# Patient Record
Sex: MALE | Race: WHITE | NOT HISPANIC OR LATINO | Employment: PART TIME | URBAN - METROPOLITAN AREA
[De-identification: names, ages, dates, MRNs, and addresses within clinical notes are randomized per-mention and may not be internally consistent; named-entity substitution may affect disease eponyms.]

---

## 2021-07-06 ENCOUNTER — HISTORICAL (OUTPATIENT)
Dept: ADMINISTRATIVE | Facility: HOSPITAL | Age: 41
End: 2021-07-06

## 2021-07-06 LAB
ABS NEUT (OLG): 4.4 X10(3)/MCL (ref 2.1–9.2)
ALBUMIN SERPL-MCNC: 4 GM/DL (ref 3.5–5)
ALBUMIN/GLOB SERPL: 1.7 RATIO (ref 1.1–2)
ALP SERPL-CCNC: 61 UNIT/L (ref 40–150)
ALT SERPL-CCNC: 17 UNIT/L (ref 0–55)
APPEARANCE, UA: CLEAR
AST SERPL-CCNC: 21 UNIT/L (ref 5–34)
BACTERIA SPEC CULT: NORMAL /HPF
BASOPHILS # BLD AUTO: 0 X10(3)/MCL (ref 0–0.2)
BASOPHILS NFR BLD AUTO: 1 %
BILIRUB SERPL-MCNC: 0.9 MG/DL
BILIRUB UR QL STRIP: NEGATIVE
BILIRUBIN DIRECT+TOT PNL SERPL-MCNC: 0.3 MG/DL (ref 0–0.5)
BILIRUBIN DIRECT+TOT PNL SERPL-MCNC: 0.6 MG/DL (ref 0–0.8)
BUN SERPL-MCNC: 10 MG/DL (ref 8.9–20.6)
CALCIUM SERPL-MCNC: 9.6 MG/DL (ref 8.4–10.2)
CHLORIDE SERPL-SCNC: 102 MMOL/L (ref 98–107)
CHOLEST SERPL-MCNC: 218 MG/DL
CHOLEST/HDLC SERPL: 5 {RATIO} (ref 0–5)
CO2 SERPL-SCNC: 28 MMOL/L (ref 22–29)
COLOR UR: YELLOW
CREAT SERPL-MCNC: 0.89 MG/DL (ref 0.73–1.18)
EOSINOPHIL # BLD AUTO: 0.1 X10(3)/MCL (ref 0–0.9)
EOSINOPHIL NFR BLD AUTO: 1 %
ERYTHROCYTE [DISTWIDTH] IN BLOOD BY AUTOMATED COUNT: 13.1 % (ref 11.5–17)
GLOBULIN SER-MCNC: 2.4 GM/DL (ref 2.4–3.5)
GLUCOSE (UA): NEGATIVE
GLUCOSE SERPL-MCNC: 79 MG/DL (ref 74–100)
HCT VFR BLD AUTO: 50.4 % (ref 42–52)
HDLC SERPL-MCNC: 47 MG/DL (ref 35–60)
HGB BLD-MCNC: 17.7 GM/DL (ref 14–18)
HGB UR QL STRIP: NEGATIVE
KETONES UR QL STRIP: NEGATIVE
LDLC SERPL CALC-MCNC: 159 MG/DL (ref 50–140)
LEUKOCYTE ESTERASE UR QL STRIP: NEGATIVE
LITHIUM SERPL-MCNC: <0.1 MMOL/L (ref 0.5–1.2)
LYMPHOCYTES # BLD AUTO: 1.3 X10(3)/MCL (ref 0.6–4.6)
LYMPHOCYTES NFR BLD AUTO: 20 %
MCH RBC QN AUTO: 32 PG (ref 27–31)
MCHC RBC AUTO-ENTMCNC: 35.1 GM/DL (ref 33–36)
MCV RBC AUTO: 91.1 FL (ref 80–94)
MONOCYTES # BLD AUTO: 0.5 X10(3)/MCL (ref 0.1–1.3)
MONOCYTES NFR BLD AUTO: 8 %
NEUTROPHILS # BLD AUTO: 4.4 X10(3)/MCL (ref 2.1–9.2)
NEUTROPHILS NFR BLD AUTO: 70 %
NITRITE UR QL STRIP: NEGATIVE
PH UR STRIP: 6 [PH] (ref 5–9)
PLATELET # BLD AUTO: 217 X10(3)/MCL (ref 130–400)
PMV BLD AUTO: 9.3 FL (ref 9.4–12.4)
POTASSIUM SERPL-SCNC: 4.4 MMOL/L (ref 3.5–5.1)
PROT SERPL-MCNC: 6.4 GM/DL (ref 6.4–8.3)
PROT UR QL STRIP: NEGATIVE
RBC # BLD AUTO: 5.53 X10(6)/MCL (ref 4.7–6.1)
RBC #/AREA URNS HPF: NORMAL /[HPF]
SODIUM SERPL-SCNC: 139 MMOL/L (ref 136–145)
SP GR UR STRIP: 1.01 (ref 1–1.03)
SQUAMOUS EPITHELIAL, UA: NORMAL /HPF
TRIGL SERPL-MCNC: 58 MG/DL (ref 34–140)
TSH SERPL-ACNC: 1.51 UIU/ML (ref 0.35–4.94)
UA WBC MAN: NORMAL
UROBILINOGEN UR STRIP-ACNC: 0.2
VLDLC SERPL CALC-MCNC: 12 MG/DL
WBC # SPEC AUTO: 6.3 X10(3)/MCL (ref 4.5–11.5)

## 2021-07-16 ENCOUNTER — HISTORICAL (OUTPATIENT)
Dept: ADMINISTRATIVE | Facility: HOSPITAL | Age: 41
End: 2021-07-16

## 2021-07-16 LAB — LITHIUM SERPL-MCNC: 0.28 MMOL/L (ref 0.5–1.2)

## 2021-09-03 ENCOUNTER — HISTORICAL (OUTPATIENT)
Dept: ADMINISTRATIVE | Facility: HOSPITAL | Age: 41
End: 2021-09-03

## 2021-09-03 LAB
ALBUMIN SERPL-MCNC: 3.8 GM/DL (ref 3.5–5)
ALBUMIN/GLOB SERPL: 1.7 RATIO (ref 1.1–2)
ALP SERPL-CCNC: 60 UNIT/L (ref 40–150)
ALT SERPL-CCNC: 16 UNIT/L (ref 0–55)
AST SERPL-CCNC: 17 UNIT/L (ref 5–34)
BILIRUB SERPL-MCNC: 0.6 MG/DL
BILIRUBIN DIRECT+TOT PNL SERPL-MCNC: 0.2 MG/DL (ref 0–0.5)
BILIRUBIN DIRECT+TOT PNL SERPL-MCNC: 0.4 MG/DL (ref 0–0.8)
BUN SERPL-MCNC: 8.8 MG/DL (ref 8.9–20.6)
CALCIUM SERPL-MCNC: 9 MG/DL (ref 8.4–10.2)
CHLORIDE SERPL-SCNC: 104 MMOL/L (ref 98–107)
CHOLEST SERPL-MCNC: 209 MG/DL
CHOLEST/HDLC SERPL: 5 {RATIO} (ref 0–5)
CO2 SERPL-SCNC: 25 MMOL/L (ref 22–29)
CREAT SERPL-MCNC: 1.03 MG/DL (ref 0.73–1.18)
GLOBULIN SER-MCNC: 2.3 GM/DL (ref 2.4–3.5)
GLUCOSE SERPL-MCNC: 96 MG/DL (ref 74–100)
HDLC SERPL-MCNC: 40 MG/DL (ref 35–60)
LDLC SERPL CALC-MCNC: 157 MG/DL (ref 50–140)
LITHIUM SERPL-MCNC: 0.16 MMOL/L (ref 0.5–1.2)
POTASSIUM SERPL-SCNC: 4.1 MMOL/L (ref 3.5–5.1)
PROT SERPL-MCNC: 6.1 GM/DL (ref 6.4–8.3)
SODIUM SERPL-SCNC: 139 MMOL/L (ref 136–145)
TRIGL SERPL-MCNC: 60 MG/DL (ref 34–140)
VLDLC SERPL CALC-MCNC: 12 MG/DL

## 2021-09-21 ENCOUNTER — HISTORICAL (OUTPATIENT)
Dept: ADMINISTRATIVE | Facility: HOSPITAL | Age: 41
End: 2021-09-21

## 2021-09-21 LAB
APPEARANCE, UA: CLEAR
BACTERIA SPEC CULT: NORMAL /HPF
BILIRUB UR QL STRIP: NEGATIVE
BUN SERPL-MCNC: 9.9 MG/DL (ref 8.9–20.6)
CALCIUM SERPL-MCNC: 9.1 MG/DL (ref 8.4–10.2)
CHLORIDE SERPL-SCNC: 101 MMOL/L (ref 98–107)
CO2 SERPL-SCNC: 25 MMOL/L (ref 22–29)
COLOR UR: YELLOW
CREAT SERPL-MCNC: 0.94 MG/DL (ref 0.73–1.18)
CREAT/UREA NIT SERPL: 11
GLUCOSE (UA): NEGATIVE
GLUCOSE SERPL-MCNC: 98 MG/DL (ref 74–100)
HGB UR QL STRIP: NEGATIVE
KETONES UR QL STRIP: NEGATIVE
LEUKOCYTE ESTERASE UR QL STRIP: NEGATIVE
LITHIUM SERPL-MCNC: 0.4 MMOL/L (ref 0.5–1.2)
NITRITE UR QL STRIP: NEGATIVE
PH UR STRIP: 7 [PH] (ref 5–9)
POTASSIUM SERPL-SCNC: 3.9 MMOL/L (ref 3.5–5.1)
PROT UR QL STRIP: NEGATIVE
RBC #/AREA URNS HPF: NORMAL /[HPF]
SODIUM SERPL-SCNC: 136 MMOL/L (ref 136–145)
SP GR UR STRIP: 1.01 (ref 1–1.03)
SQUAMOUS EPITHELIAL, UA: NORMAL /HPF (ref 0–4)
UROBILINOGEN UR STRIP-ACNC: 1
WBC #/AREA URNS HPF: NORMAL /HPF

## 2021-10-05 ENCOUNTER — HISTORICAL (OUTPATIENT)
Dept: ADMINISTRATIVE | Facility: HOSPITAL | Age: 41
End: 2021-10-05

## 2021-10-05 LAB
ABS NEUT (OLG): 5.72 X10(3)/MCL (ref 2.1–9.2)
ALBUMIN SERPL-MCNC: 4.1 GM/DL (ref 3.5–5)
ALBUMIN/GLOB SERPL: 1.7 RATIO (ref 1.1–2)
ALP SERPL-CCNC: 59 UNIT/L (ref 40–150)
ALT SERPL-CCNC: 18 UNIT/L (ref 0–55)
AST SERPL-CCNC: 15 UNIT/L (ref 5–34)
BASOPHILS # BLD AUTO: 0 X10(3)/MCL (ref 0–0.2)
BASOPHILS NFR BLD AUTO: 1 %
BILIRUB SERPL-MCNC: 0.5 MG/DL
BILIRUBIN DIRECT+TOT PNL SERPL-MCNC: 0.2 MG/DL (ref 0–0.5)
BILIRUBIN DIRECT+TOT PNL SERPL-MCNC: 0.3 MG/DL (ref 0–0.8)
BUN SERPL-MCNC: 9 MG/DL (ref 8.9–20.6)
CALCIUM SERPL-MCNC: 9.2 MG/DL (ref 8.4–10.2)
CHLORIDE SERPL-SCNC: 104 MMOL/L (ref 98–107)
CO2 SERPL-SCNC: 24 MMOL/L (ref 22–29)
CREAT SERPL-MCNC: 1.04 MG/DL (ref 0.73–1.18)
EOSINOPHIL # BLD AUTO: 0.3 X10(3)/MCL (ref 0–0.9)
EOSINOPHIL NFR BLD AUTO: 3 %
ERYTHROCYTE [DISTWIDTH] IN BLOOD BY AUTOMATED COUNT: 12.6 % (ref 11.5–17)
GLOBULIN SER-MCNC: 2.4 GM/DL (ref 2.4–3.5)
GLUCOSE SERPL-MCNC: 111 MG/DL (ref 74–100)
HCT VFR BLD AUTO: 50.8 % (ref 42–52)
HGB BLD-MCNC: 17.4 GM/DL (ref 14–18)
LITHIUM SERPL-MCNC: 0.62 MMOL/L (ref 0.5–1.2)
LYMPHOCYTES # BLD AUTO: 1.3 X10(3)/MCL (ref 0.6–4.6)
LYMPHOCYTES NFR BLD AUTO: 16 %
MCH RBC QN AUTO: 30.1 PG (ref 27–31)
MCHC RBC AUTO-ENTMCNC: 34.3 GM/DL (ref 33–36)
MCV RBC AUTO: 87.7 FL (ref 80–94)
MONOCYTES # BLD AUTO: 0.6 X10(3)/MCL (ref 0.1–1.3)
MONOCYTES NFR BLD AUTO: 7 %
NEUTROPHILS # BLD AUTO: 5.72 X10(3)/MCL (ref 2.1–9.2)
NEUTROPHILS NFR BLD AUTO: 72 %
PLATELET # BLD AUTO: 195 X10(3)/MCL (ref 130–400)
PMV BLD AUTO: 9.8 FL (ref 9.4–12.4)
POTASSIUM SERPL-SCNC: 3.9 MMOL/L (ref 3.5–5.1)
PROT SERPL-MCNC: 6.5 GM/DL (ref 6.4–8.3)
RBC # BLD AUTO: 5.79 X10(6)/MCL (ref 4.7–6.1)
SODIUM SERPL-SCNC: 140 MMOL/L (ref 136–145)
TESTOST SERPL-MCNC: 389.66 NG/DL (ref 240.24–870.68)
WBC # SPEC AUTO: 7.9 X10(3)/MCL (ref 4.5–11.5)

## 2021-11-15 ENCOUNTER — HISTORICAL (OUTPATIENT)
Dept: ADMINISTRATIVE | Facility: HOSPITAL | Age: 41
End: 2021-11-15

## 2021-11-15 LAB
ABS NEUT (OLG): 3.89 X10(3)/MCL (ref 2.1–9.2)
ALBUMIN SERPL-MCNC: 3.9 GM/DL (ref 3.5–5)
ALBUMIN/GLOB SERPL: 1.8 RATIO (ref 1.1–2)
ALP SERPL-CCNC: 58 UNIT/L (ref 40–150)
ALT SERPL-CCNC: 16 UNIT/L (ref 0–55)
AST SERPL-CCNC: 14 UNIT/L (ref 5–34)
BASOPHILS # BLD AUTO: 0 X10(3)/MCL (ref 0–0.2)
BASOPHILS NFR BLD AUTO: 1 %
BILIRUB SERPL-MCNC: 0.4 MG/DL
BILIRUBIN DIRECT+TOT PNL SERPL-MCNC: 0.2 MG/DL (ref 0–0.5)
BILIRUBIN DIRECT+TOT PNL SERPL-MCNC: 0.2 MG/DL (ref 0–0.8)
BUN SERPL-MCNC: 8.9 MG/DL (ref 8.9–20.6)
CALCIUM SERPL-MCNC: 9.3 MG/DL (ref 8.7–10.5)
CHLORIDE SERPL-SCNC: 106 MMOL/L (ref 98–107)
CHOLEST SERPL-MCNC: 192 MG/DL
CHOLEST/HDLC SERPL: 5 {RATIO} (ref 0–5)
CO2 SERPL-SCNC: 27 MMOL/L (ref 22–29)
CREAT SERPL-MCNC: 0.91 MG/DL (ref 0.73–1.18)
EOSINOPHIL # BLD AUTO: 0.5 X10(3)/MCL (ref 0–0.9)
EOSINOPHIL NFR BLD AUTO: 8 %
ERYTHROCYTE [DISTWIDTH] IN BLOOD BY AUTOMATED COUNT: 15 % (ref 11.5–17)
EST. AVERAGE GLUCOSE BLD GHB EST-MCNC: 105.4 MG/DL
GLOBULIN SER-MCNC: 2.2 GM/DL (ref 2.4–3.5)
GLUCOSE SERPL-MCNC: 115 MG/DL (ref 74–100)
HBA1C MFR BLD: 5.3 %
HCT VFR BLD AUTO: 44.8 % (ref 42–52)
HDLC SERPL-MCNC: 41 MG/DL (ref 35–60)
HGB BLD-MCNC: 15.4 GM/DL (ref 14–18)
LDLC SERPL CALC-MCNC: 133 MG/DL (ref 50–140)
LITHIUM SERPL-MCNC: 0.57 MMOL/L (ref 0.5–1.2)
LYMPHOCYTES # BLD AUTO: 1.4 X10(3)/MCL (ref 0.6–4.6)
LYMPHOCYTES NFR BLD AUTO: 22 %
MCH RBC QN AUTO: 30.1 PG (ref 27–31)
MCHC RBC AUTO-ENTMCNC: 34.4 GM/DL (ref 33–36)
MCV RBC AUTO: 87.5 FL (ref 80–94)
MONOCYTES # BLD AUTO: 0.4 X10(3)/MCL (ref 0.1–1.3)
MONOCYTES NFR BLD AUTO: 6 %
NEUTROPHILS # BLD AUTO: 3.89 X10(3)/MCL (ref 2.1–9.2)
NEUTROPHILS NFR BLD AUTO: 63 %
PLATELET # BLD AUTO: 199 X10(3)/MCL (ref 130–400)
PMV BLD AUTO: 9.4 FL (ref 9.4–12.4)
POTASSIUM SERPL-SCNC: 4 MMOL/L (ref 3.5–5.1)
PROT SERPL-MCNC: 6.1 GM/DL (ref 6.4–8.3)
PSA SERPL-MCNC: 0.32 NG/ML
RBC # BLD AUTO: 5.12 X10(6)/MCL (ref 4.7–6.1)
SODIUM SERPL-SCNC: 142 MMOL/L (ref 136–145)
TRIGL SERPL-MCNC: 92 MG/DL (ref 34–140)
TSH SERPL-ACNC: 3.25 UIU/ML (ref 0.35–4.94)
VLDLC SERPL CALC-MCNC: 18 MG/DL
WBC # SPEC AUTO: 6.2 X10(3)/MCL (ref 4.5–11.5)

## 2022-04-10 ENCOUNTER — HISTORICAL (OUTPATIENT)
Dept: ADMINISTRATIVE | Facility: HOSPITAL | Age: 42
End: 2022-04-10
Payer: MEDICAID

## 2022-04-27 VITALS
WEIGHT: 207 LBS | OXYGEN SATURATION: 99 % | DIASTOLIC BLOOD PRESSURE: 89 MMHG | SYSTOLIC BLOOD PRESSURE: 127 MMHG | BODY MASS INDEX: 29.63 KG/M2 | HEIGHT: 70 IN

## 2022-11-25 ENCOUNTER — LAB VISIT (OUTPATIENT)
Dept: LAB | Facility: HOSPITAL | Age: 42
End: 2022-11-25
Attending: NURSE PRACTITIONER
Payer: MEDICAID

## 2022-11-25 DIAGNOSIS — Z13.1 SCREENING FOR DIABETES MELLITUS: ICD-10-CM

## 2022-11-25 DIAGNOSIS — Z13.6 SCREENING FOR ISCHEMIC HEART DISEASE: ICD-10-CM

## 2022-11-25 DIAGNOSIS — Z00.00 ROUTINE GENERAL MEDICAL EXAMINATION AT A HEALTH CARE FACILITY: Primary | ICD-10-CM

## 2022-11-25 DIAGNOSIS — Z11.3 SCREENING EXAMINATION FOR VENEREAL DISEASE: ICD-10-CM

## 2022-11-25 DIAGNOSIS — Z13.220 SCREENING FOR LIPOID DISORDERS: ICD-10-CM

## 2022-11-25 DIAGNOSIS — Z36.3 ENCOUNTER FOR ROUTINE SCREENING FOR MALFORMATION USING ULTRASONICS: ICD-10-CM

## 2022-11-25 LAB
ALBUMIN SERPL-MCNC: 4.1 GM/DL (ref 3.5–5)
ALBUMIN/GLOB SERPL: 1.8 RATIO (ref 1.1–2)
ALP SERPL-CCNC: 77 UNIT/L (ref 40–150)
ALT SERPL-CCNC: 18 UNIT/L (ref 0–55)
AST SERPL-CCNC: 21 UNIT/L (ref 5–34)
BASOPHILS # BLD AUTO: 0.05 X10(3)/MCL (ref 0–0.2)
BASOPHILS NFR BLD AUTO: 1 %
BILIRUBIN DIRECT+TOT PNL SERPL-MCNC: 0.5 MG/DL
BUN SERPL-MCNC: 10.2 MG/DL (ref 8.9–20.6)
CALCIUM SERPL-MCNC: 9.4 MG/DL (ref 8.4–10.2)
CHLORIDE SERPL-SCNC: 109 MMOL/L (ref 98–107)
CHOLEST SERPL-MCNC: 216 MG/DL
CHOLEST/HDLC SERPL: 4 {RATIO} (ref 0–5)
CO2 SERPL-SCNC: 24 MMOL/L (ref 22–29)
CREAT SERPL-MCNC: 0.93 MG/DL (ref 0.73–1.18)
EOSINOPHIL # BLD AUTO: 0.22 X10(3)/MCL (ref 0–0.9)
EOSINOPHIL NFR BLD AUTO: 4.3 %
ERYTHROCYTE [DISTWIDTH] IN BLOOD BY AUTOMATED COUNT: 12.4 % (ref 11.5–17)
EST. AVERAGE GLUCOSE BLD GHB EST-MCNC: 99.7 MG/DL
GFR SERPLBLD CREATININE-BSD FMLA CKD-EPI: >60 MLS/MIN/1.73/M2
GLOBULIN SER-MCNC: 2.3 GM/DL (ref 2.4–3.5)
GLUCOSE SERPL-MCNC: 91 MG/DL (ref 74–100)
HBA1C MFR BLD: 5.1 %
HCT VFR BLD AUTO: 47.7 % (ref 42–52)
HDLC SERPL-MCNC: 52 MG/DL (ref 35–60)
HGB BLD-MCNC: 16.8 GM/DL (ref 14–18)
IMM GRANULOCYTES # BLD AUTO: 0.01 X10(3)/MCL (ref 0–0.04)
IMM GRANULOCYTES NFR BLD AUTO: 0.2 %
LDLC SERPL CALC-MCNC: 149 MG/DL (ref 50–140)
LYMPHOCYTES # BLD AUTO: 1.8 X10(3)/MCL (ref 0.6–4.6)
LYMPHOCYTES NFR BLD AUTO: 35.6 %
MCH RBC QN AUTO: 31 PG (ref 27–31)
MCHC RBC AUTO-ENTMCNC: 35.2 MG/DL (ref 33–36)
MCV RBC AUTO: 88 FL (ref 80–94)
MONOCYTES # BLD AUTO: 0.51 X10(3)/MCL (ref 0.1–1.3)
MONOCYTES NFR BLD AUTO: 10.1 %
NEUTROPHILS # BLD AUTO: 2.5 X10(3)/MCL (ref 2.1–9.2)
NEUTROPHILS NFR BLD AUTO: 48.8 %
NRBC BLD AUTO-RTO: 0 %
PLATELET # BLD AUTO: 218 X10(3)/MCL (ref 130–400)
PMV BLD AUTO: 9.2 FL (ref 7.4–10.4)
POTASSIUM SERPL-SCNC: 3.8 MMOL/L (ref 3.5–5.1)
PROT SERPL-MCNC: 6.4 GM/DL (ref 6.4–8.3)
RBC # BLD AUTO: 5.42 X10(6)/MCL (ref 4.7–6.1)
SODIUM SERPL-SCNC: 142 MMOL/L (ref 136–145)
T4 FREE SERPL-MCNC: 0.69 NG/DL (ref 0.7–1.48)
TRIGL SERPL-MCNC: 76 MG/DL (ref 34–140)
TSH SERPL-ACNC: 1.16 UIU/ML (ref 0.35–4.94)
VLDLC SERPL CALC-MCNC: 15 MG/DL
WBC # SPEC AUTO: 5.1 X10(3)/MCL (ref 4.5–11.5)

## 2022-11-25 PROCEDURE — 85025 COMPLETE CBC W/AUTO DIFF WBC: CPT

## 2022-11-25 PROCEDURE — 84439 ASSAY OF FREE THYROXINE: CPT

## 2022-11-25 PROCEDURE — 80061 LIPID PANEL: CPT

## 2022-11-25 PROCEDURE — 36415 COLL VENOUS BLD VENIPUNCTURE: CPT

## 2022-11-25 PROCEDURE — 83036 HEMOGLOBIN GLYCOSYLATED A1C: CPT

## 2022-11-25 PROCEDURE — 84443 ASSAY THYROID STIM HORMONE: CPT

## 2022-11-25 PROCEDURE — 80053 COMPREHEN METABOLIC PANEL: CPT

## 2022-11-25 PROCEDURE — 86803 HEPATITIS C AB TEST: CPT

## 2022-11-26 LAB — HCV AB SERPL QL IA: NONREACTIVE

## 2022-12-27 ENCOUNTER — HOSPITAL ENCOUNTER (INPATIENT)
Facility: HOSPITAL | Age: 42
LOS: 4 days | Discharge: HOME | DRG: 581 | End: 2022-12-31
Attending: STUDENT IN AN ORGANIZED HEALTH CARE EDUCATION/TRAINING PROGRAM | Admitting: INTERNAL MEDICINE
Payer: MEDICAID

## 2022-12-27 DIAGNOSIS — L03.114 CELLULITIS OF LEFT ELBOW: ICD-10-CM

## 2022-12-27 DIAGNOSIS — M25.522 LEFT ELBOW PAIN: Primary | ICD-10-CM

## 2022-12-27 DIAGNOSIS — M70.22 OLECRANON BURSITIS OF LEFT ELBOW: ICD-10-CM

## 2022-12-27 DIAGNOSIS — L02.91 ABSCESS: ICD-10-CM

## 2022-12-27 DIAGNOSIS — R07.9 CHEST PAIN: ICD-10-CM

## 2022-12-27 PROBLEM — F31.9 BIPOLAR 1 DISORDER: Status: ACTIVE | Noted: 2022-12-27

## 2022-12-27 LAB
ALBUMIN SERPL-MCNC: 3 G/DL (ref 3.5–5)
ALBUMIN/GLOB SERPL: 0.8 RATIO (ref 1.1–2)
ALP SERPL-CCNC: 79 UNIT/L (ref 40–150)
ALT SERPL-CCNC: 16 UNIT/L (ref 0–55)
AMPHET UR QL SCN: POSITIVE
APPEARANCE UR: ABNORMAL
AST SERPL-CCNC: 12 UNIT/L (ref 5–34)
BACTERIA #/AREA URNS AUTO: ABNORMAL /HPF
BARBITURATE SCN PRESENT UR: NEGATIVE
BASOPHILS # BLD AUTO: 0.03 X10(3)/MCL (ref 0–0.2)
BASOPHILS NFR BLD AUTO: 0.3 %
BENZODIAZ UR QL SCN: NEGATIVE
BILIRUB UR QL STRIP.AUTO: ABNORMAL MG/DL
BILIRUBIN DIRECT+TOT PNL SERPL-MCNC: 0.7 MG/DL
BUN SERPL-MCNC: 9.8 MG/DL (ref 8.9–20.6)
CALCIUM SERPL-MCNC: 9.6 MG/DL (ref 8.4–10.2)
CANNABINOIDS UR QL SCN: POSITIVE
CHLORIDE SERPL-SCNC: 101 MMOL/L (ref 98–107)
CO2 SERPL-SCNC: 28 MMOL/L (ref 22–29)
COCAINE UR QL SCN: NEGATIVE
COLOR UR AUTO: ABNORMAL
CREAT SERPL-MCNC: 0.95 MG/DL (ref 0.73–1.18)
CRP SERPL-MCNC: 284.2 MG/L
EOSINOPHIL # BLD AUTO: 0.03 X10(3)/MCL (ref 0–0.9)
EOSINOPHIL NFR BLD AUTO: 0.3 %
ERYTHROCYTE [DISTWIDTH] IN BLOOD BY AUTOMATED COUNT: 11.5 % (ref 11.6–14.4)
ERYTHROCYTE [SEDIMENTATION RATE] IN BLOOD: 27 MM/HR (ref 0–15)
FENTANYL UR QL SCN: NEGATIVE
GFR SERPLBLD CREATININE-BSD FMLA CKD-EPI: >60 MLS/MIN/1.73/M2
GLOBULIN SER-MCNC: 3.9 GM/DL (ref 2.4–3.5)
GLUCOSE SERPL-MCNC: 99 MG/DL (ref 74–100)
GLUCOSE UR QL STRIP.AUTO: NORMAL MG/DL
HCT VFR BLD AUTO: 44.5 % (ref 42–52)
HGB BLD-MCNC: 15.7 GM/DL (ref 14–18)
HYALINE CASTS #/AREA URNS LPF: >20 /LPF
IMM GRANULOCYTES # BLD AUTO: 0.04 X10(3)/MCL (ref 0–0.04)
IMM GRANULOCYTES NFR BLD AUTO: 0.3 %
KETONES UR QL STRIP.AUTO: ABNORMAL MG/DL
LACTATE SERPL-SCNC: 1.5 MMOL/L (ref 0.5–2.2)
LEUKOCYTE ESTERASE UR QL STRIP.AUTO: 25 UNIT/L
LYMPHOCYTES # BLD AUTO: 0.76 X10(3)/MCL (ref 0.6–4.6)
LYMPHOCYTES NFR BLD AUTO: 6.6 %
MCH RBC QN AUTO: 30.4 PG
MCHC RBC AUTO-ENTMCNC: 35.3 MG/DL (ref 33–36)
MCV RBC AUTO: 86.1 FL (ref 80–94)
MDMA UR QL SCN: NEGATIVE
MONOCYTES # BLD AUTO: 0.78 X10(3)/MCL (ref 0.1–1.3)
MONOCYTES NFR BLD AUTO: 6.8 %
MUCOUS THREADS URNS QL MICRO: ABNORMAL /LPF
NEUTROPHILS # BLD AUTO: 9.9 X10(3)/MCL (ref 2.1–9.2)
NEUTROPHILS NFR BLD AUTO: 85.7 %
NITRITE UR QL STRIP.AUTO: NEGATIVE
NRBC BLD AUTO-RTO: 0 % (ref 0–1)
OPIATES UR QL SCN: POSITIVE
PCP UR QL: NEGATIVE
PH UR STRIP.AUTO: 6.5 [PH]
PH UR: 6.5 [PH] (ref 3–11)
PLATELET # BLD AUTO: 134 X10(3)/MCL (ref 140–371)
PLATELETS.RETICULATED NFR BLD AUTO: 3.2 % (ref 0.9–11.2)
PMV BLD AUTO: 9.7 FL (ref 9.4–12.4)
POTASSIUM SERPL-SCNC: 3.8 MMOL/L (ref 3.5–5.1)
PROT SERPL-MCNC: 6.9 GM/DL (ref 6.4–8.3)
PROT UR QL STRIP.AUTO: ABNORMAL MG/DL
RBC # BLD AUTO: 5.17 X10(6)/MCL (ref 4.7–6.1)
RBC #/AREA URNS AUTO: ABNORMAL /HPF
RBC UR QL AUTO: NEGATIVE UNIT/L
SODIUM SERPL-SCNC: 138 MMOL/L (ref 136–145)
SP GR UR STRIP.AUTO: 1.05
SQUAMOUS #/AREA URNS LPF: ABNORMAL /HPF
UROBILINOGEN UR STRIP-ACNC: 4 MG/DL
WBC # SPEC AUTO: 11.5 X10(3)/MCL (ref 4.5–11.5)
WBC #/AREA URNS AUTO: ABNORMAL /HPF
WBC CLUMPS UR QL AUTO: ABNORMAL /HPF

## 2022-12-27 PROCEDURE — 80307 DRUG TEST PRSMV CHEM ANLYZR: CPT | Performed by: PHYSICIAN ASSISTANT

## 2022-12-27 PROCEDURE — 25000003 PHARM REV CODE 250: Performed by: STUDENT IN AN ORGANIZED HEALTH CARE EDUCATION/TRAINING PROGRAM

## 2022-12-27 PROCEDURE — 63600175 PHARM REV CODE 636 W HCPCS: Performed by: STUDENT IN AN ORGANIZED HEALTH CARE EDUCATION/TRAINING PROGRAM

## 2022-12-27 PROCEDURE — S4991 NICOTINE PATCH NONLEGEND: HCPCS | Performed by: STUDENT IN AN ORGANIZED HEALTH CARE EDUCATION/TRAINING PROGRAM

## 2022-12-27 PROCEDURE — 25000003 PHARM REV CODE 250: Performed by: PHYSICIAN ASSISTANT

## 2022-12-27 PROCEDURE — 85651 RBC SED RATE NONAUTOMATED: CPT | Performed by: PHYSICIAN ASSISTANT

## 2022-12-27 PROCEDURE — 63600175 PHARM REV CODE 636 W HCPCS: Performed by: PHYSICIAN ASSISTANT

## 2022-12-27 PROCEDURE — 87070 CULTURE OTHR SPECIMN AEROBIC: CPT | Performed by: PHYSICIAN ASSISTANT

## 2022-12-27 PROCEDURE — A9577 INJ MULTIHANCE: HCPCS

## 2022-12-27 PROCEDURE — 96375 TX/PRO/DX INJ NEW DRUG ADDON: CPT

## 2022-12-27 PROCEDURE — 96372 THER/PROPH/DIAG INJ SC/IM: CPT | Performed by: STUDENT IN AN ORGANIZED HEALTH CARE EDUCATION/TRAINING PROGRAM

## 2022-12-27 PROCEDURE — 25500020 PHARM REV CODE 255

## 2022-12-27 PROCEDURE — 87040 BLOOD CULTURE FOR BACTERIA: CPT | Performed by: PHYSICIAN ASSISTANT

## 2022-12-27 PROCEDURE — G0378 HOSPITAL OBSERVATION PER HR: HCPCS

## 2022-12-27 PROCEDURE — 96365 THER/PROPH/DIAG IV INF INIT: CPT

## 2022-12-27 PROCEDURE — 86140 C-REACTIVE PROTEIN: CPT | Performed by: PHYSICIAN ASSISTANT

## 2022-12-27 PROCEDURE — 85025 COMPLETE CBC W/AUTO DIFF WBC: CPT | Performed by: PHYSICIAN ASSISTANT

## 2022-12-27 PROCEDURE — 96361 HYDRATE IV INFUSION ADD-ON: CPT

## 2022-12-27 PROCEDURE — 11000001 HC ACUTE MED/SURG PRIVATE ROOM

## 2022-12-27 PROCEDURE — 83605 ASSAY OF LACTIC ACID: CPT | Performed by: PHYSICIAN ASSISTANT

## 2022-12-27 PROCEDURE — 96366 THER/PROPH/DIAG IV INF ADDON: CPT

## 2022-12-27 PROCEDURE — 81001 URINALYSIS AUTO W/SCOPE: CPT | Performed by: PHYSICIAN ASSISTANT

## 2022-12-27 PROCEDURE — 99285 EMERGENCY DEPT VISIT HI MDM: CPT | Mod: 25

## 2022-12-27 PROCEDURE — 80053 COMPREHEN METABOLIC PANEL: CPT | Performed by: PHYSICIAN ASSISTANT

## 2022-12-27 PROCEDURE — 87088 URINE BACTERIA CULTURE: CPT | Performed by: PHYSICIAN ASSISTANT

## 2022-12-27 RX ORDER — ONDANSETRON 2 MG/ML
4 INJECTION INTRAMUSCULAR; INTRAVENOUS EVERY 8 HOURS PRN
Status: DISCONTINUED | OUTPATIENT
Start: 2022-12-27 | End: 2022-12-31 | Stop reason: HOSPADM

## 2022-12-27 RX ORDER — IBUPROFEN 200 MG
16 TABLET ORAL
Status: DISCONTINUED | OUTPATIENT
Start: 2022-12-27 | End: 2022-12-31 | Stop reason: HOSPADM

## 2022-12-27 RX ORDER — LEVOFLOXACIN 5 MG/ML
500 INJECTION, SOLUTION INTRAVENOUS
Status: DISCONTINUED | OUTPATIENT
Start: 2022-12-27 | End: 2022-12-30

## 2022-12-27 RX ORDER — HYDROCODONE BITARTRATE AND ACETAMINOPHEN 5; 325 MG/1; MG/1
1 TABLET ORAL
Status: COMPLETED | OUTPATIENT
Start: 2022-12-27 | End: 2022-12-27

## 2022-12-27 RX ORDER — KETOROLAC TROMETHAMINE 30 MG/ML
30 INJECTION, SOLUTION INTRAMUSCULAR; INTRAVENOUS
Status: COMPLETED | OUTPATIENT
Start: 2022-12-27 | End: 2022-12-27

## 2022-12-27 RX ORDER — ACETAMINOPHEN 325 MG/1
650 TABLET ORAL EVERY 8 HOURS PRN
Status: DISCONTINUED | OUTPATIENT
Start: 2022-12-27 | End: 2022-12-31 | Stop reason: HOSPADM

## 2022-12-27 RX ORDER — SODIUM CHLORIDE, SODIUM LACTATE, POTASSIUM CHLORIDE, CALCIUM CHLORIDE 600; 310; 30; 20 MG/100ML; MG/100ML; MG/100ML; MG/100ML
INJECTION, SOLUTION INTRAVENOUS CONTINUOUS
Status: DISCONTINUED | OUTPATIENT
Start: 2022-12-27 | End: 2022-12-29

## 2022-12-27 RX ORDER — ENOXAPARIN SODIUM 100 MG/ML
40 INJECTION SUBCUTANEOUS EVERY 24 HOURS
Status: DISCONTINUED | OUTPATIENT
Start: 2022-12-27 | End: 2022-12-31 | Stop reason: HOSPADM

## 2022-12-27 RX ORDER — IBUPROFEN 200 MG
24 TABLET ORAL
Status: DISCONTINUED | OUTPATIENT
Start: 2022-12-27 | End: 2022-12-31 | Stop reason: HOSPADM

## 2022-12-27 RX ORDER — SULFAMETHOXAZOLE AND TRIMETHOPRIM 800; 160 MG/1; MG/1
1 TABLET ORAL
Status: ON HOLD | COMMUNITY
Start: 2022-12-24 | End: 2022-12-31 | Stop reason: HOSPADM

## 2022-12-27 RX ORDER — HYDROCODONE BITARTRATE AND ACETAMINOPHEN 10; 325 MG/1; MG/1
1 TABLET ORAL EVERY 4 HOURS PRN
Status: DISCONTINUED | OUTPATIENT
Start: 2022-12-27 | End: 2022-12-28

## 2022-12-27 RX ORDER — SODIUM CHLORIDE 0.9 % (FLUSH) 0.9 %
10 SYRINGE (ML) INJECTION EVERY 12 HOURS PRN
Status: DISCONTINUED | OUTPATIENT
Start: 2022-12-27 | End: 2022-12-31 | Stop reason: HOSPADM

## 2022-12-27 RX ORDER — NALOXONE HCL 0.4 MG/ML
0.02 VIAL (ML) INJECTION
Status: DISCONTINUED | OUTPATIENT
Start: 2022-12-27 | End: 2022-12-31 | Stop reason: HOSPADM

## 2022-12-27 RX ORDER — IBUPROFEN 200 MG
1 TABLET ORAL DAILY
Status: DISCONTINUED | OUTPATIENT
Start: 2022-12-27 | End: 2022-12-31 | Stop reason: HOSPADM

## 2022-12-27 RX ORDER — IBUPROFEN 200 MG
1 TABLET ORAL DAILY
Status: DISCONTINUED | OUTPATIENT
Start: 2022-12-28 | End: 2022-12-27

## 2022-12-27 RX ORDER — GLUCAGON 1 MG
1 KIT INJECTION
Status: DISCONTINUED | OUTPATIENT
Start: 2022-12-27 | End: 2022-12-31 | Stop reason: HOSPADM

## 2022-12-27 RX ORDER — MORPHINE SULFATE 2 MG/ML
2 INJECTION, SOLUTION INTRAMUSCULAR; INTRAVENOUS EVERY 4 HOURS PRN
Status: DISCONTINUED | OUTPATIENT
Start: 2022-12-28 | End: 2022-12-28

## 2022-12-27 RX ADMIN — MORPHINE SULFATE 2 MG: 2 INJECTION, SOLUTION INTRAMUSCULAR; INTRAVENOUS at 11:12

## 2022-12-27 RX ADMIN — VANCOMYCIN HYDROCHLORIDE 1750 MG: 1 INJECTION, POWDER, LYOPHILIZED, FOR SOLUTION INTRAVENOUS at 10:12

## 2022-12-27 RX ADMIN — KETOROLAC TROMETHAMINE 30 MG: 30 INJECTION, SOLUTION INTRAMUSCULAR; INTRAVENOUS at 07:12

## 2022-12-27 RX ADMIN — SODIUM CHLORIDE, POTASSIUM CHLORIDE, SODIUM LACTATE AND CALCIUM CHLORIDE 1000 ML: 600; 310; 30; 20 INJECTION, SOLUTION INTRAVENOUS at 09:12

## 2022-12-27 RX ADMIN — HYDROCODONE BITARTRATE AND ACETAMINOPHEN 1 TABLET: 10; 325 TABLET ORAL at 03:12

## 2022-12-27 RX ADMIN — NICOTINE 1 PATCH: 14 PATCH, EXTENDED RELEASE TRANSDERMAL at 09:12

## 2022-12-27 RX ADMIN — HYDROCODONE BITARTRATE AND ACETAMINOPHEN 1 TABLET: 10; 325 TABLET ORAL at 07:12

## 2022-12-27 RX ADMIN — SODIUM CHLORIDE, POTASSIUM CHLORIDE, SODIUM LACTATE AND CALCIUM CHLORIDE: 600; 310; 30; 20 INJECTION, SOLUTION INTRAVENOUS at 03:12

## 2022-12-27 RX ADMIN — GADOBENATE DIMEGLUMINE 20 ML: 529 INJECTION, SOLUTION INTRAVENOUS at 12:12

## 2022-12-27 RX ADMIN — LEVOFLOXACIN 500 MG: 5 INJECTION, SOLUTION INTRAVENOUS at 07:12

## 2022-12-27 RX ADMIN — ENOXAPARIN SODIUM 40 MG: 40 INJECTION SUBCUTANEOUS at 06:12

## 2022-12-27 RX ADMIN — ACETAMINOPHEN 650 MG: 325 TABLET ORAL at 03:12

## 2022-12-27 RX ADMIN — VANCOMYCIN HYDROCHLORIDE 2000 MG: 1 INJECTION, POWDER, LYOPHILIZED, FOR SOLUTION INTRAVENOUS at 10:12

## 2022-12-27 RX ADMIN — HYDROCODONE BITARTRATE AND ACETAMINOPHEN 1 TABLET: 5; 325 TABLET ORAL at 09:12

## 2022-12-27 NOTE — H&P
[facility] Medicine Wards History & Physical Note     Resident Team: [facility] Medicine List 1  Attending Physician: Denny Choi MD  Resident: Twin Coates MD  Intern: Ranjit Thompson MD     Date of Admit: 12/27/2022    Chief Complaint     Abscess (States has flu and presents with left elbow abscess and cellulitis.  )   for     Subjective:      History of Present Illness:  Dimitry Lee is a 42 y.o. male with a history of bipolar & general anxiety disorder who presented to the ED on 12/27/2022  with a primary complaint of L elbow abscess and cellulitis. Pt reports waking up Saturday 12/24/22 with a blister on his elbow which popped resulting in an abscess. Has several tattoos on his L arm with the most recent one being 2 weeks ago resulting in small blisters for which he was on bactrim for 3 weeks at time for about 3 months. Also reports having the flu last week and had lost about 10 pnds since then. Had one episode of diarrhea yesterday associated with nausea and fever of 103. He is allergic to keflex ( rash reactions). Denies IV drug use but admits to [facility]. UDS positive for amphetamines, Cannabinoids, phencyclidine.  He reports decreased ROM of the L arm with pain rated 9/10. Denies SOB, chest pain, abdominal pain, vomiting.    Upon arrival to the ED, VS were stable, labs significant for Plt 134, .2, sed rate 27, UDS + for amphetamines, Cannabinoids, phencyclidine.    Past Medical History:  Past Medical History:   Diagnosis Date    Bipolar disorder, unspecified     CLARE (generalized anxiety disorder)     Testicular hypofunction        Past Surgical History:  Past Surgical History:   Procedure Laterality Date    FOOT SURGERY Left     WRIST SURGERY     Appendectomy  chelecystectomy    Family History:  History reviewed. No pertinent family history.    Social History:  Social History     Tobacco Use    Smoking status: Every Day     Types: Vaping with nicotine    Smokeless tobacco: Never  "  Substance Use Topics    Alcohol use: Not Currently       Allergies:  Review of patient's allergies indicates:   Allergen Reactions    Abilify [aripiprazole]     Prednisone     Keflex [cephalexin] Rash       Home Medications:  Prior to Admission medications    Medication Sig Start Date End Date Taking? Authorizing Provider   sulfamethoxazole-trimethoprim 800-160mg (BACTRIM DS) 800-160 mg Tab Take 1 tablet by mouth. 22 Yes Historical Provider         Review of Systems:  Review of Systems   Constitutional:  Positive for diaphoresis, fever and weight loss.   HENT:  Negative for congestion and sore throat.    Eyes:  Negative for blurred vision and double vision.   Respiratory:  Negative for cough, sputum production and shortness of breath.    Cardiovascular:  Negative for chest pain, palpitations and leg swelling.   Gastrointestinal:  Positive for nausea. Negative for abdominal pain and vomiting.   Genitourinary:  Negative for dysuria.   Musculoskeletal:  Negative for back pain and myalgias.   Skin:  Positive for rash.   Neurological:  Negative for dizziness, focal weakness, loss of consciousness and headaches.          Objective:   Last 24 Hour Vital Signs:  BP  Min: 108/82  Max: 118/84  Temp  Av.8 °F (37.1 °C)  Min: 98.4 °F (36.9 °C)  Max: 99.4 °F (37.4 °C)  Pulse  Av.7  Min: 74  Max: 98  Resp  Av.3  Min: 17  Max: 20  SpO2  Av.8 %  Min: 99 %  Max: 100 %  Height  Av' 10" (177.8 cm)  Min: 5' 10" (177.8 cm)  Max: 5' 10" (177.8 cm)  Weight  Av.6 kg (212 lb 15.4 oz)  Min: 96.6 kg (212 lb 15.4 oz)  Max: 96.6 kg (212 lb 15.4 oz)  Body mass index is 30.56 kg/m².  No intake/output data recorded.    Physical Examination:  Physical Exam  Vitals and nursing note reviewed.   Constitutional:       General: He is not in acute distress.  HENT:      Head: Normocephalic and atraumatic.   Eyes:      Pupils: Pupils are equal, round, and reactive to light.   Cardiovascular:      Rate and " Rhythm: Normal rate.      Pulses: Normal pulses.      Heart sounds: Normal heart sounds. No murmur heard.    No friction rub. No gallop.   Pulmonary:      Effort: Pulmonary effort is normal. No respiratory distress.      Breath sounds: Normal breath sounds. No wheezing or rales.   Abdominal:      General: Abdomen is flat. There is no distension.      Palpations: Abdomen is soft.      Tenderness: There is no abdominal tenderness.   Musculoskeletal:      Cervical back: Neck supple.      Right lower leg: No edema.      Left lower leg: No edema.      Comments: LUE: tender, swollen, erythematous, small blisters extending from the shoulder to the arm, multiple tattoos noted, decreased ROM.  peeling of skin, drainage. Sensation intact, radial pulses present    L elbow: large purulent abscess noted    Skin:     General: Skin is warm.      Capillary Refill: Capillary refill takes less than 2 seconds.      Findings: Erythema and lesion present.   Neurological:      General: No focal deficit present.      Mental Status: He is alert and oriented to person, place, and time.       Laboratory:  Most Recent Data:  CBC:   Lab Results   Component Value Date    WBC 11.5 12/27/2022    HGB 15.7 12/27/2022    HCT 44.5 12/27/2022     (L) 12/27/2022    MCV 86.1 12/27/2022    RDW 11.5 (L) 12/27/2022     WBC Differential:   Recent Labs   Lab 12/27/22  0717   WBC 11.5   HGB 15.7   HCT 44.5   *   MCV 86.1     BMP:   Lab Results   Component Value Date     12/27/2022    K 3.8 12/27/2022    CO2 28 12/27/2022    BUN 9.8 12/27/2022    CREATININE 0.95 12/27/2022    CALCIUM 9.6 12/27/2022     LFTs:   Lab Results   Component Value Date    ALBUMIN 3.0 (L) 12/27/2022    BILITOT 0.7 12/27/2022    AST 12 12/27/2022    ALKPHOS 79 12/27/2022    ALT 16 12/27/2022     Coags: No results found for: INR, PROTIME, PTT  FLP:   Lab Results   Component Value Date    CHOL 216 (H) 11/25/2022    HDL 52 11/25/2022    TRIG 76 11/25/2022     DM:   Lab  Results   Component Value Date    HGBA1C 5.1 11/25/2022    HGBA1C 5.3 11/15/2021    CREATININE 0.95 12/27/2022     Thyroid:   Lab Results   Component Value Date    TSH 1.1618 11/25/2022      Anemia: No results found for: IRON, TIBC, FERRITIN, SATURATEDIRO  No results found for: [id number]  No results found for: FOLATE     Cardiac: No results found for: TROPONINI, CKTOTAL, CKMB, BNP  Urinalysis:   Lab Results   Component Value Date    COLORU YELLOW 09/21/2021    PHUA 6.5 12/27/2022    NITRITE Negative 09/21/2021    KETONESU Negative 09/21/2021    UROBILINOGEN +4 (A) 12/27/2022    WBCUA 51-99 (A) 12/27/2022       Trended Lab Data:  Recent Labs   Lab 12/27/22 0717   WBC 11.5   HGB 15.7   HCT 44.5   *   MCV 86.1   RDW 11.5*      K 3.8   CO2 28   BUN 9.8   CREATININE 0.95   ALBUMIN 3.0*   BILITOT 0.7   AST 12   ALKPHOS 79   ALT 16       Trended Cardiac Data:  No results for input(s): TROPONINI, CKTOTAL, CKMB, BNP in the last 168 hours.    Microbiology Data:  Microbiology Results (last 7 days)       Procedure Component Value Units Date/Time    Blood Culture [[id number]] Collected: 12/27/22 0912    Order Status: Sent Specimen: Blood from Arm, Right Updated: 12/27/22 0915    Blood Culture [[id number]] Collected: 12/27/22 0912    Order Status: Resulted Specimen: Blood from Hand, Right Updated: 12/27/22 0914    Chlamydia/GC, PCR [[id number]]     Order Status: Sent Specimen: Urine     Urine culture [[id number]] Collected: 12/27/22 0755    Order Status: Sent Specimen: Urine Updated: 12/27/22 0826    Wound Culture [[id number]] Collected: 12/27/22 0735    Order Status: Sent Specimen: Abscess from Elbow, Left Updated: 12/27/22 0757               Radiology:  Imaging Results              MRI Elbow Joint W WO Contrast Left (Final result)  Result time 12/27/22 15:04:05      Final result by Morteza Daniel MD (12/27/22 15:04:05)                   Impression:      Fluid collection posterior subcutaneous adipose tissue in the  region of the olecranon bursa which is somewhat infiltrative predominantly with some areas demonstrating early organization measuring 8.2 x 5.8 x 1.7 cm.  This is likely predominantly and non drainable fluid collection.    Extensive severe cellulitis throughout the visualized elbow.    Moderate intramuscular edema involving the flexor digitorum profundus muscle and to a lesser degree the flexor carpi ulnaris muscle suggesting myositis.    Degenerative change radiocapitellar joint.      Electronically signed by: Siomara Daniel MD  Date:    12/27/2022  Time:    15:04               Narrative:    EXAMINATION:  MRI ELBOW W WO CONTRAST LEFT    CLINICAL HISTORY:  Septic arthritis suspected, elbow, xray done;  Pain in left elbow    TECHNIQUE:  MRI left elbow performed before and after intravenous contrast using routine protocol.    COMPARISON:  Radiograph same day    FINDINGS:  Moderate intensity edema noted within the flexor carpi ulnaris muscle and flexor digitorum profundus proximally within the field of view.  No gross muscular tissue disruption or tendon disruption.  No intramuscular fluid.  There is a region of somewhat infiltrative fluid with some areas faisal sing in the posterior subcutaneous adipose tissue near the region of the olecranon bursa measuring 8.2 x 5.8 x 1.8 cm.  Severe generalized cellulitis noted.  Bones demonstrate normal signal without fracture or destructive lesion.  There is moderate degenerative change of the radiocapitellar joint including joint space narrowing, periarticular cartilage thinning, and patchy areas of periarticular subchondral fibrocystic change.  No significant joint effusion.  Bones otherwise demonstrate normal signal.  Biceps and brachialis tendons are intact and demonstrate normal signal.  Common extensor tendon normal.  Ulnar collateral ligament and radial collateral ligaments grossly intact.  Triceps tendon normal.                                       X-Ray Elbow Complete  Left (Final result)  Result time 12/27/22 07:27:06      Final result by Ashleigh Oneil MD (12/27/22 07:27:06)                   Impression:      No acute bony abnormality.      Electronically signed by: Ashleigh Oneil  Date:    12/27/2022  Time:    07:27               Narrative:    EXAMINATION:  XR ELBOW COMPLETE 3 VIEW LEFT    CLINICAL HISTORY:  Cellulitis of left upper limb    COMPARISON:  None.    FINDINGS:  There is no acute fracture or malalignment.  There is no olecranon enthesophyte.  There is no evidence of an elbow joint effusion.  Soft tissue swelling is noted.                                         Assessment & Plan:     L elbow abcess/cellulitis       -Sed rate 27; .2       - Afebrile on admission, low suspicion for septic joint       - XR negative for fracture or bony abnormality       - Ortho not on today, will consult in am       -start on vanc        -MM pain control with norco pRN       -Getting surgery to evaluate       -Follow MRI       -LR @125ml/hr       -blood, wound, urine cx pending    Bipolar disorder/anxiety       - On many meds, will resume    Thrombocytopenia       - Plt 134 on admission       -Likely 2/2 to bactrim       - Repeat cbc in morning       - will monitor           CODE STATUS: full code  Access: PIV  Antibiotics: vanc  Diet: NPO  DVT Prophylaxis: lovenox 40 subQ Q24hrs  GI Prophylaxis: none  Fluids: LR @125ml/hr      Disposition: Start on vanc, surgery to evaluate, ortho consult in the morning, IVF with LR@125ml/hr      Ranjit Thompson MD  [facility] -1

## 2022-12-27 NOTE — CONSULTS
[facility]/General Surgery  History & Physical    SUBJECTIVE:     Chief Complaint:   Left elbow pain    History of Present Illness:  42M w/ history of bipolar and general anxiety disorder presenting to the ED with 4 days of serous drainage and cellulitis to left elbow.  Patient reports that he recently received a new tattoo on the left upper arm.  He was taken Bactrim recently due to small skin abscesses that have also occurred during his arm tattoos.  He denies any IV drug use, admits to marijuana use.      On exam, eschar over the left elbow, notable surrounding erythema and cellulitis.  MRI imaging was completed noting a 8 x 2 by 6 cm disorganized fluid collection near the olecranon bursa that is predominantly a non drainable fluid collection.  Surgery was consulted for drainage of this non drainable fluid collection.    Patient does not appear septic at this time.  White blood cell count of 11.5, elevated ESR and CRP    Allergies:  Review of patient's allergies indicates:   Allergen Reactions    Abilify [aripiprazole]     Prednisone     Keflex [cephalexin] Rash       Home Medications:  No current facility-administered medications on file prior to encounter.     Current Outpatient Medications on File Prior to Encounter   Medication Sig    sulfamethoxazole-trimethoprim 800-160mg (BACTRIM DS) 800-160 mg Tab Take 1 tablet by mouth.       Past Medical History:   Diagnosis Date    Bipolar disorder, unspecified     CLARE (generalized anxiety disorder)     Testicular hypofunction      Past Surgical History:   Procedure Laterality Date    FOOT SURGERY Left     WRIST SURGERY       History reviewed. No pertinent family history.  Social History     Tobacco Use    Smoking status: Every Day     Types: Vaping with nicotine    Smokeless tobacco: Never   Substance Use Topics    Alcohol use: Not Currently        Review of Systems:  Constitutional: no fever or chills  Eyes: no visual changes  ENT: no nasal congestion or sore  throat  Respiratory: no cough or shortness of breath  Cardiovascular: no chest pain or palpitations  Gastrointestinal: no nausea or vomiting, no abdominal pain or change in bowel habits  Genitourinary: no hematuria or dysuria  Integument/Breast: no rash or pruritis  Hematologic/Lymphatic: no easy bruising or lymphadenopathy  Musculoskeletal: no arthralgias or myalgias  Neurological: no seizures or tremors  Behavioral/Psych: no auditory or visual hallucinations  Endocrine: no heat or cold intolerance    OBJECTIVE:     Vital Signs:  Temp: 99.4 °F (37.4 °C) (12/27/22 1544)  Pulse: 76 (12/27/22 1544)  Resp: 18 (12/27/22 1544)  BP: 110/80 (12/27/22 1544)  SpO2: 100 % (12/27/22 1544)    Physical Exam:  General: well developed, well nourished, no distress  HEENT: normocephalic, atraumatic, hearing grossly normal bilaterally, mucous membranes moist, EOM intact, no scleral icterus  Neck: supple, symmetrical, trachea midline, no JVD  Lungs:  clear to auscultation bilaterally and normal respiratory effort  Cardiovascular: regular rate and rhythm.  Extremities:  Left elbow eschar, notable cellulitis and erythema.  See picture below  Abdomen: soft, non-tender to palpation, no distention, no masses/hernias  Skin: Skin color, texture, turgor normal. No rashes or lesions  Musculoskeletal:no clubbing, cyanosis, no deformities  Neurologic: No focal numbness or weakness  Psych/Behavioral:  Alert and oriented, appropriate affect.              Laboratory:  Labs Reviewed   C-REACTIVE PROTEIN - Abnormal; Notable for the following components:       Result Value    C-Reactive Protein 284.20 (*)     All other components within normal limits   COMPREHENSIVE METABOLIC PANEL - Abnormal; Notable for the following components:    Albumin Level 3.0 (*)     Globulin 3.9 (*)     Albumin/Globulin Ratio 0.8 (*)     All other components within normal limits   SEDIMENTATION RATE, AUTOMATED - Abnormal; Notable for the following components:    Sed Rate 27  (*)     All other components within normal limits   URINALYSIS, REFLEX TO URINE CULTURE - Abnormal; Notable for the following components:    Color, UA Orange (*)     Appearance, UA Hazy (*)     Protein, UA 1+ (*)     Ketones, UA Trace (*)     Bilirubin, UA 1+ (*)     Urobilinogen, UA +4 (*)     Leukocyte Esterase, UA 25 (*)     WBC, UA 51-99 (*)     WBC Clumps, UA Few (*)     Squamous Epithelial Cells, UA Trace (*)     Mucous, UA Many (*)     Hyaline Casts, UA >20 (*)     All other components within normal limits   DRUG SCREEN, URINE (BEAKER) - Abnormal; Notable for the following components:    Amphetamines, Urine Positive (*)     Cannabinoids, Urine Positive (*)     Opiates, Urine Positive (*)     All other components within normal limits    Narrative:     Cut off concentrations:                    Amphetamines - 1000 ng/ml                  Barbiturates - 200 ng/ml                  Benzodiazepine - 200 ng/ml                  Cannabinoids (THC) - 50 ng/ml                  Cocaine - 300 ng/ml                  Fentanyl - 1.0 ng/ml                  MDMA - 500 ng/ml                  Opiates - 300 ng/ml                   Phencyclidine (PCP) - 25 ng/ml                                    Specimen submitted for drug analysis and tested for pH and specific gravity in order to evaluate sample integrity. Suspect tampering if specific gravity is <1.003 and/or pH is not within the range of 4.5 - 8.0                  False negatives may result form substances such as bleach added to urine.                  False positives may result for the presence of a substance with similar chemical structure to the drug or its metabolite.                                    This test provides only a PRELIMINARY analytical test result. A more specific alternate chemical method must be used in order to obtain a confirmed analytical result. Gas chromatography/mass spectrometry (GC/MS) is the preferred confirmatory method. Other chemical  confirmation methods are available. Clinical consideration and professional judgement should be applied to any drug of abuse test result, particularly when preliminary positive results are used.                                    Positive results will be confirmed only at the physicians request. Unconfirmed screening results are to be used only for medical purposes (treatment).                                        CBC WITH DIFFERENTIAL - Abnormal; Notable for the following components:    RDW 11.5 (*)     Platelet 134 (*)     Neut # 9.90 (*)     All other components within normal limits   LACTIC ACID, PLASMA - Normal   WOUND CULTURE ([facility])   CULTURE, URINE   CHLAMYDIA/GONORRHOEAE(GC), PCR   BLOOD CULTURE [facility]   BLOOD CULTURE [facility]   CBC W/ AUTO DIFFERENTIAL    Narrative:     The following orders were created for panel order CBC Auto Differential.                  Procedure                               Abnormality         Status                                     ---------                               -----------         ------                                     CBC with Differential[[id number]]        Abnormal            Final result                                                 Please view results for these tests on the individual orders.   EXTRA TUBES    Narrative:     The following orders were created for panel order EXTRA TUBES.                  Procedure                               Abnormality         Status                                     ---------                               -----------         ------                                     Light Blue Top Hold[[id number]]                              In process                                 Red Top Hold[[id number]]                                     In process                                 Gold Top Hold[[id number]]                                    In process                                 Pink Top Hold[[id number]]                                     In process                                                   Please view results for these tests on the individual orders.   LIGHT BLUE TOP HOLD   RED TOP HOLD   GOLD TOP HOLD   PINK TOP HOLD       Diagnostic Results:  Imaging Results              MRI Elbow Joint W WO Contrast Left (Final result)  Result time 12/27/22 15:04:05      Final result by Morteza Daniel MD (12/27/22 15:04:05)                   Impression:      Fluid collection posterior subcutaneous adipose tissue in the region of the olecranon bursa which is somewhat infiltrative predominantly with some areas demonstrating early organization measuring 8.2 x 5.8 x 1.7 cm.  This is likely predominantly and non drainable fluid collection.    Extensive severe cellulitis throughout the visualized elbow.    Moderate intramuscular edema involving the flexor digitorum profundus muscle and to a lesser degree the flexor carpi ulnaris muscle suggesting myositis.    Degenerative change radiocapitellar joint.      Electronically signed by: Siomara Daniel MD  Date:    12/27/2022  Time:    15:04               Narrative:    EXAMINATION:  MRI ELBOW W WO CONTRAST LEFT    CLINICAL HISTORY:  Septic arthritis suspected, elbow, xray done;  Pain in left elbow    TECHNIQUE:  MRI left elbow performed before and after intravenous contrast using routine protocol.    COMPARISON:  Radiograph same day    FINDINGS:  Moderate intensity edema noted within the flexor carpi ulnaris muscle and flexor digitorum profundus proximally within the field of view.  No gross muscular tissue disruption or tendon disruption.  No intramuscular fluid.  There is a region of somewhat infiltrative fluid with some areas faisal sing in the posterior subcutaneous adipose tissue near the region of the olecranon bursa measuring 8.2 x 5.8 x 1.8 cm.  Severe generalized cellulitis noted.  Bones demonstrate normal signal without fracture or destructive lesion.  There is moderate degenerative change of the  radiocapitellar joint including joint space narrowing, periarticular cartilage thinning, and patchy areas of periarticular subchondral fibrocystic change.  No significant joint effusion.  Bones otherwise demonstrate normal signal.  Biceps and brachialis tendons are intact and demonstrate normal signal.  Common extensor tendon normal.  Ulnar collateral ligament and radial collateral ligaments grossly intact.  Triceps tendon normal.                                       X-Ray Elbow Complete Left (Final result)  Result time 12/27/22 07:27:06      Final result by Ashleigh Oneil MD (12/27/22 07:27:06)                   Impression:      No acute bony abnormality.      Electronically signed by: Ashleigh Oneil  Date:    12/27/2022  Time:    07:27               Narrative:    EXAMINATION:  XR ELBOW COMPLETE 3 VIEW LEFT    CLINICAL HISTORY:  Cellulitis of left upper limb    COMPARISON:  None.    FINDINGS:  There is no acute fracture or malalignment.  There is no olecranon enthesophyte.  There is no evidence of an elbow joint effusion.  Soft tissue swelling is noted.                                      ASSESSMENT:     42-year-old male with left arm cellulitis, no organized fluid collection on MRI.  Surgery consulted for drainage.    PLAN:     - NPOmn in case patient becomes septic and needs urgent intervention  - discussed with radiology, no drainable fluid collection, remains disorganized and nondrainable at this time. No joint involvement though very close to the olecranon.  - Ivabx per primary  - will re-evaluate for possible drainage if fluid collection becomes organized        Rodger Orourke MD  [facility] PGY3  4:28 PM

## 2022-12-27 NOTE — ED PROVIDER NOTES
Encounter Date: 12/27/2022       History     Chief Complaint   Patient presents with    Abscess     States has flu and presents with left elbow abscess and cellulitis.       Patient is a 42 year old male with a hx of bipolar & general anxiety disorder who presents to the emergency department with complaints of worsening abscess and cellulitis to left elbow x 4 days.  He states he recently had the flu, was feeling better until this began Saturday.  He does endorse having a fever yesterday however is not sure if its due to recent flu or this infection.   He claims he hasn't had much drainage but the swelling, redness, and pain is worsening.  He states he has been on several rounds of oral antibiotics including recent bactrim due to small skin abscesses that have occured since he got his arm tattoos.  He admits to mariajuana use but denies IV drug use.  He currently rates his pain 9/10.  He denies nausea, vomiting, SOB, CP, dizziness, abdominal pain.      The history is provided by the patient. No  was used.   Abscess   This is a new problem. Illness onset: 4 days ago. The problem has been gradually worsening. Affected Location: left elbow. The pain is at a severity of 9/10. The abscess is characterized by redness, peeling, painfulness and swelling. Associated symptoms include a fever. Pertinent negatives include no vomiting and no cough.   Review of patient's allergies indicates:   Allergen Reactions    Abilify [aripiprazole]     Prednisone     Keflex [cephalexin] Rash     Past Medical History:   Diagnosis Date    Bipolar disorder, unspecified     CLARE (generalized anxiety disorder)     Testicular hypofunction      Past Surgical History:   Procedure Laterality Date    FOOT SURGERY Left     WRIST SURGERY       History reviewed. No pertinent family history.  Social History     Tobacco Use    Smoking status: Every Day     Types: Vaping with nicotine    Smokeless tobacco: Never   Substance Use Topics     Alcohol use: Not Currently     Review of Systems   Constitutional:  Positive for fever. Negative for chills.   Respiratory:  Negative for cough and shortness of breath.    Cardiovascular:  Negative for chest pain and palpitations.   Gastrointestinal:  Negative for abdominal pain, nausea and vomiting.   Musculoskeletal:  Negative for back pain and neck pain.        Left arm/elbow pain   Skin:  Negative for rash.        Abscess/cellulitis /left elbow   Neurological:  Negative for dizziness, weakness, light-headedness and headaches.   Hematological:  Negative for adenopathy. Does not bruise/bleed easily.     Physical Exam     Initial Vitals [12/27/22 0616]   BP Pulse Resp Temp SpO2   118/84 98 17 98.4 °F (36.9 °C) 99 %      MAP       --         Physical Exam    Nursing note and vitals reviewed.  Constitutional: He appears well-developed and well-nourished.   HENT:   Nose: Nose normal.   Mouth/Throat: Oropharynx is clear and moist.   Eyes: Conjunctivae are normal.   Neck: Neck supple.   Normal range of motion.  Cardiovascular:  Normal rate, normal heart sounds and intact distal pulses.           Pulmonary/Chest: Breath sounds normal.   Abdominal: Abdomen is soft. Bowel sounds are normal. There is no abdominal tenderness.   Musculoskeletal:         General: Normal range of motion.        Arms:       Cervical back: Normal range of motion and neck supple.      Comments: Cellulitis of over left elbow, moderate erythema expanding into lower and upper left arm, peeling of skin, drainage, reduced ROM     Lymphadenopathy:     He has no cervical adenopathy.   Neurological: He is alert. GCS score is 15. GCS eye subscore is 4. GCS verbal subscore is 5. GCS motor subscore is 6.   Skin: Skin is warm. Capillary refill takes less than 2 seconds.             ED Course   Procedures  Labs Reviewed   C-REACTIVE PROTEIN - Abnormal; Notable for the following components:       Result Value    C-Reactive Protein 284.20 (*)     All other  components within normal limits   COMPREHENSIVE METABOLIC PANEL - Abnormal; Notable for the following components:    Albumin Level 3.0 (*)     Globulin 3.9 (*)     Albumin/Globulin Ratio 0.8 (*)     All other components within normal limits   SEDIMENTATION RATE, AUTOMATED - Abnormal; Notable for the following components:    Sed Rate 27 (*)     All other components within normal limits   URINALYSIS, REFLEX TO URINE CULTURE - Abnormal; Notable for the following components:    Color, UA Orange (*)     Appearance, UA Hazy (*)     Protein, UA 1+ (*)     Ketones, UA Trace (*)     Bilirubin, UA 1+ (*)     Urobilinogen, UA +4 (*)     Leukocyte Esterase, UA 25 (*)     WBC, UA 51-99 (*)     WBC Clumps, UA Few (*)     Squamous Epithelial Cells, UA Trace (*)     Mucous, UA Many (*)     Hyaline Casts, UA >20 (*)     All other components within normal limits   DRUG SCREEN, URINE (BEAKER) - Abnormal; Notable for the following components:    Amphetamines, Urine Positive (*)     Cannabinoids, Urine Positive (*)     Opiates, Urine Positive (*)     All other components within normal limits    Narrative:     Cut off concentrations:    Amphetamines - 1000 ng/ml  Barbiturates - 200 ng/ml  Benzodiazepine - 200 ng/ml  Cannabinoids (THC) - 50 ng/ml  Cocaine - 300 ng/ml  Fentanyl - 1.0 ng/ml  MDMA - 500 ng/ml  Opiates - 300 ng/ml   Phencyclidine (PCP) - 25 ng/ml    Specimen submitted for drug analysis and tested for pH and specific gravity in order to evaluate sample integrity. Suspect tampering if specific gravity is <1.003 and/or pH is not within the range of 4.5 - 8.0  False negatives may result form substances such as bleach added to urine.  False positives may result for the presence of a substance with similar chemical structure to the drug or its metabolite.    This test provides only a PRELIMINARY analytical test result. A more specific alternate chemical method must be used in order to obtain a confirmed analytical result. Gas  chromatography/mass spectrometry (GC/MS) is the preferred confirmatory method. Other chemical confirmation methods are available. Clinical consideration and professional judgement should be applied to any drug of abuse test result, particularly when preliminary positive results are used.    Positive results will be confirmed only at the physicians request. Unconfirmed screening results are to be used only for medical purposes (treatment).        CBC WITH DIFFERENTIAL - Abnormal; Notable for the following components:    RDW 11.5 (*)     Platelet 134 (*)     Neut # 9.90 (*)     All other components within normal limits   LACTIC ACID, PLASMA - Normal   CHLAMYDIA/GONORRHOEAE(GC), PCR   BLOOD CULTURE [facility]   BLOOD CULTURE [facility]   CBC W/ AUTO DIFFERENTIAL    Narrative:     The following orders were created for panel order CBC Auto Differential.  Procedure                               Abnormality         Status                     ---------                               -----------         ------                     CBC with Differential[[id number]]        Abnormal            Final result                 Please view results for these tests on the individual orders.   EXTRA TUBES    Narrative:     The following orders were created for panel order EXTRA TUBES.  Procedure                               Abnormality         Status                     ---------                               -----------         ------                     Light Blue Top Hold[[id number]]                              In process                 Red Top Hold[[id number]]                                     In process                 Gold Top Hold[[id number]]                                    In process                 Pink Top Hold[[id number]]                                    In process                   Please view results for these tests on the individual orders.   LIGHT BLUE TOP HOLD   RED TOP HOLD   GOLD TOP HOLD   PINK TOP HOLD          Imaging Results               MRI Elbow Joint W WO Contrast Left (Final result)  Result time 12/27/22 15:04:05      Final result by Morteza Daniel MD (12/27/22 15:04:05)                   Impression:      Fluid collection posterior subcutaneous adipose tissue in the region of the olecranon bursa which is somewhat infiltrative predominantly with some areas demonstrating early organization measuring 8.2 x 5.8 x 1.7 cm.  This is likely predominantly and non drainable fluid collection.    Extensive severe cellulitis throughout the visualized elbow.    Moderate intramuscular edema involving the flexor digitorum profundus muscle and to a lesser degree the flexor carpi ulnaris muscle suggesting myositis.    Degenerative change radiocapitellar joint.      Electronically signed by: Siomara Daniel MD  Date:    12/27/2022  Time:    15:04               Narrative:    EXAMINATION:  MRI ELBOW W WO CONTRAST LEFT    CLINICAL HISTORY:  Septic arthritis suspected, elbow, xray done;  Pain in left elbow    TECHNIQUE:  MRI left elbow performed before and after intravenous contrast using routine protocol.    COMPARISON:  Radiograph same day    FINDINGS:  Moderate intensity edema noted within the flexor carpi ulnaris muscle and flexor digitorum profundus proximally within the field of view.  No gross muscular tissue disruption or tendon disruption.  No intramuscular fluid.  There is a region of somewhat infiltrative fluid with some areas faisal sing in the posterior subcutaneous adipose tissue near the region of the olecranon bursa measuring 8.2 x 5.8 x 1.8 cm.  Severe generalized cellulitis noted.  Bones demonstrate normal signal without fracture or destructive lesion.  There is moderate degenerative change of the radiocapitellar joint including joint space narrowing, periarticular cartilage thinning, and patchy areas of periarticular subchondral fibrocystic change.  No significant joint effusion.  Bones otherwise demonstrate normal signal.  Biceps and  brachialis tendons are intact and demonstrate normal signal.  Common extensor tendon normal.  Ulnar collateral ligament and radial collateral ligaments grossly intact.  Triceps tendon normal.                                       X-Ray Elbow Complete Left (Final result)  Result time 12/27/22 07:27:06      Final result by Ashleigh Oneil MD (12/27/22 07:27:06)                   Impression:      No acute bony abnormality.      Electronically signed by: Ashleigh Oneil  Date:    12/27/2022  Time:    07:27               Narrative:    EXAMINATION:  XR ELBOW COMPLETE 3 VIEW LEFT    CLINICAL HISTORY:  Cellulitis of left upper limb    COMPARISON:  None.    FINDINGS:  There is no acute fracture or malalignment.  There is no olecranon enthesophyte.  There is no evidence of an elbow joint effusion.  Soft tissue swelling is noted.                                       Medications   vancomycin (VANCOCIN) 1,750 mg in sodium chloride 0.9% 500 mL IVPB (0 mg Intravenous Stopped 12/28/22 0048)   sodium chloride 0.9% flush 10 mL (has no administration in time range)   naloxone 0.4 mg/mL injection 0.02 mg (has no administration in time range)   glucose chewable tablet 16 g (has no administration in time range)   glucose chewable tablet 24 g (has no administration in time range)   glucagon (human recombinant) injection 1 mg (has no administration in time range)   dextrose 10% bolus 125 mL 125 mL (has no administration in time range)   dextrose 10% bolus 250 mL 250 mL (has no administration in time range)   ondansetron injection 4 mg (has no administration in time range)   acetaminophen tablet 650 mg (650 mg Oral Given 12/27/22 1526)   enoxaparin injection 40 mg (40 mg Subcutaneous Given 12/27/22 1835)   HYDROcodone-acetaminophen  mg per tablet 1 tablet (1 tablet Oral Given 12/28/22 0502)   lactated ringers infusion ( Intravenous New Bag 12/28/22 0025)   levoFLOXacin 500 mg/100 mL IVPB 500 mg (0 mg Intravenous Stopped 12/27/22  2000)   nicotine 14 mg/24 hr 1 patch (1 patch Transdermal Patch Applied 12/27/22 6286)   morphine injection 4 mg (has no administration in time range)   ketorolac injection 30 mg (30 mg Intravenous Given 12/27/22 0735)   lactated ringers bolus 1,000 mL (0 mLs Intravenous Stopped 12/27/22 1004)   HYDROcodone-acetaminophen 5-325 mg per tablet 1 tablet (1 tablet Oral Given 12/27/22 0904)   vancomycin (VANCOCIN) 2,000 mg in sodium chloride 0.9% 500 mL IVPB (0 mg Intravenous Stopped 12/27/22 1225)   gadobenate dimeglumine (MULTIHANCE) 529 mg/mL (0.1mmol/0.2mL) injection (20 mLs Intravenous Given 12/27/22 1230)     Medical Decision Making:   Clinical Tests:   Lab Tests: Ordered and Reviewed  Radiological Study: Ordered and Reviewed  Other:   I have discussed this case with another health care provider.       <> Summary of the Discussion: I consulted Dr. Hernandez for a face to face evaluation with this patient prior to consulting medicine for admission for IV antibiotics.       APC / Resident Notes:   Agree with Yu's assessment/plan as listed above.  She consulted me on this patient with returned of labs with suspicion for required IV antibiotics and admission.    History.  42-year-old male presents to ED for several days of multiple superficial wounds and boils of the elbow that have since spontaneously ruptured and worsened.  States the surrounding area has become warm red and increased in size.  Tender to palpation.  Denies aggressive spread or systemic complaints.  No other complaints or concerns at this time.    Physical exam.  General: Well appearing, nontoxic, no acute distress  Head: Normocephalic Atraumatic  Eyes: EOMI  ENT: Airway patent, no stridor  Neck: supple  Chest: Lungs CTAB, no respiratory distress  Cardiac: RRR, no murmurs, rubs or gallops  Abdomen: soft, no rebound / guarding / rigidity to deep palpation.  Musculoskeletal:  affected elbow demonstrates some serosanguineous drainage w/o large localized  fluid pocket. Retains range of elbow. N/v intact distally. No crepitus.   Skin: Normal skin tone  Neuro: A&Ox3; No obvious focal deficit    Assessment/plan.  Patient has recently been on p.o. Bactrim.  Given the size, failed outpatient antibiotics course and elevated acute inflammatory markers patient requires admission for IV antibiotics.  able to range elbow and suspicion for intra-articular septic arthritis is low.  Patient also reporting more generalized superficial discomfort.  Consulted Medicine who initially were agreeable with admission however then requested orthopedic or surgery consultation to r/o joint involvement.  After MRI all parties agreed his infection appear superficial in nature and ultimately admitted patient to their services.  Care transitioned. (David)       ED Course as of 12/28/22 0756   Tue Dec 27, 2022   0746 X-Ray Elbow Complete Left  No acute bony abnormality. [ER]   0818 Sed Rate(!): 27 [ER]   0836 WBC, UA(!): 51-99 [ER]   0836 Leukocytes, UA(!): 25 [ER]   0836 CRP(!): 284.20 [ER]   0851 Consulted internal medicine for admission for IV antibiotics for cellulitis of left elbow with failure of outpatient antibiotics.   [ER]   1009 Medicine is concerned for septic joint so MRI was ordered and Ortho will evaluate the patient.   [ER]      ED Course User Index  [ER] ANAND Pelletier                   Clinical Impression:   Final diagnoses:  [L03.114] Cellulitis of left elbow        ED Disposition Condition    Observation                 ANAND Pelletier  12/27/22 0912       Jacob Hernandez MD  12/28/22 0802

## 2022-12-27 NOTE — PROGRESS NOTES
Pharmacokinetic Initial Assessment: IV Vancomycin    Assessment/Plan:    Initiate intravenous vancomycin with loading dose of 2000 mg once followed by a maintenance dose of vancomycin 1750mg IV every 12 hours  Desired empiric serum trough concentration is 10 to 15 mcg/mL  Draw vancomycin trough level 60 min prior to third dose on 12/28 at approximately 1000  Pharmacy will continue to follow and monitor vancomycin.      Please contact pharmacy at extension [phone] with any questions regarding this assessment.     Thank you for the consult,   Manuela Mckeon       Patient brief summary:  Dimitry Lee is a 42 y.o. male initiated on antimicrobial therapy with IV Vancomycin for treatment of suspected skin & soft tissue infection    Drug Allergies:   Review of patient's allergies indicates:   Allergen Reactions    Abilify [aripiprazole]     Prednisone     Keflex [cephalexin] Rash       Actual Body Weight:   96.6kg    Renal Function:   Estimated Creatinine Clearance: 115.6 mL/min (based on SCr of 0.95 mg/dL).,     Dialysis Method (if applicable):  N/A    CBC (last 72 hours):  Recent Labs   Lab Result Units 12/27/22  0717   WBC x10(3)/mcL 11.5   Hgb gm/dL 15.7   Hct % 44.5   Platelet x10(3)/mcL 134*   Mono % % 6.8   Eos % % 0.3   Basophil % % 0.3       Metabolic Panel (last 72 hours):  Recent Labs   Lab Result Units 12/27/22  0717 12/27/22  0755   Sodium Level mmol/L 138  --    Potassium Level mmol/L 3.8  --    Chloride mmol/L 101  --    Carbon Dioxide mmol/L 28  --    Glucose Level mg/dL 99  --    Glucose, UA mg/dL  --  Normal   Blood Urea Nitrogen mg/dL 9.8  --    Creatinine mg/dL 0.95  --    Albumin Level g/dL 3.0*  --    Bilirubin Total mg/dL 0.7  --    Alkaline Phosphatase unit/L 79  --    Aspartate Aminotransferase unit/L 12  --    Alanine Aminotransferase unit/L 16  --        Drug levels (last 3 results):  No results for input(s): VANCOMYCINRA, VANCORANDOM, VANCOMYCINPE, VANCOPEAK, VANCOMYCINTR, VANCOTROUGH in the  last 72 hours.    Microbiologic Results:  Microbiology Results (last 7 days)       Procedure Component Value Units Date/Time    Blood Culture [[id number]]     Order Status: Sent Specimen: Blood     Blood Culture [[id number]]     Order Status: Sent Specimen: Blood     Chlamydia/GC, PCR [[id number]]     Order Status: Sent Specimen: Urine     Urine culture [[id number]] Collected: 12/27/22 0755    Order Status: Sent Specimen: Urine Updated: 12/27/22 0826    Wound Culture [[id number]] Collected: 12/27/22 0735    Order Status: Sent Specimen: Abscess from Elbow, Left Updated: 12/27/22 0758

## 2022-12-28 ENCOUNTER — ANESTHESIA EVENT (OUTPATIENT)
Dept: SURGERY | Facility: HOSPITAL | Age: 42
DRG: 581 | End: 2022-12-28
Payer: MEDICAID

## 2022-12-28 ENCOUNTER — ANESTHESIA (OUTPATIENT)
Dept: SURGERY | Facility: HOSPITAL | Age: 42
DRG: 581 | End: 2022-12-28
Payer: MEDICAID

## 2022-12-28 LAB
ALBUMIN SERPL-MCNC: 2.3 G/DL (ref 3.5–5)
ALBUMIN/GLOB SERPL: 0.7 RATIO (ref 1.1–2)
ALP SERPL-CCNC: 61 UNIT/L (ref 40–150)
ALT SERPL-CCNC: 10 UNIT/L (ref 0–55)
AST SERPL-CCNC: 9 UNIT/L (ref 5–34)
BASOPHILS # BLD AUTO: 0.01 X10(3)/MCL (ref 0–0.2)
BASOPHILS NFR BLD AUTO: 0.1 %
BILIRUBIN DIRECT+TOT PNL SERPL-MCNC: 0.4 MG/DL
BUN SERPL-MCNC: 9 MG/DL (ref 8.9–20.6)
C TRACH DNA SPEC QL NAA+PROBE: NOT DETECTED
CALCIUM SERPL-MCNC: 8.5 MG/DL (ref 8.4–10.2)
CHLORIDE SERPL-SCNC: 106 MMOL/L (ref 98–107)
CO2 SERPL-SCNC: 25 MMOL/L (ref 22–29)
CREAT SERPL-MCNC: 0.75 MG/DL (ref 0.73–1.18)
EOSINOPHIL # BLD AUTO: 0.09 X10(3)/MCL (ref 0–0.9)
EOSINOPHIL NFR BLD AUTO: 1.2 %
ERYTHROCYTE [DISTWIDTH] IN BLOOD BY AUTOMATED COUNT: 11.8 % (ref 11.6–14.4)
GFR SERPLBLD CREATININE-BSD FMLA CKD-EPI: >60 MLS/MIN/1.73/M2
GLOBULIN SER-MCNC: 3.1 GM/DL (ref 2.4–3.5)
GLUCOSE SERPL-MCNC: 94 MG/DL (ref 74–100)
HCT VFR BLD AUTO: 37.2 % (ref 42–52)
HGB BLD-MCNC: 13.3 GM/DL (ref 14–18)
HIV 1+2 AB+HIV1 P24 AG SERPL QL IA: NONREACTIVE
IGA SERPL-MCNC: 45 MG/DL (ref 63–484)
IGG SERPL-MCNC: 414 MG/DL (ref 540–1822)
IGM SERPL-MCNC: 25 MG/DL (ref 22–240)
IMM GRANULOCYTES # BLD AUTO: 0.02 X10(3)/MCL (ref 0–0.04)
IMM GRANULOCYTES NFR BLD AUTO: 0.3 %
LYMPHOCYTES # BLD AUTO: 0.76 X10(3)/MCL (ref 0.6–4.6)
LYMPHOCYTES NFR BLD AUTO: 10.1 %
MCH RBC QN AUTO: 30.9 PG
MCHC RBC AUTO-ENTMCNC: 35.8 MG/DL (ref 33–36)
MCV RBC AUTO: 86.5 FL (ref 80–94)
MONOCYTES # BLD AUTO: 0.72 X10(3)/MCL (ref 0.1–1.3)
MONOCYTES NFR BLD AUTO: 9.6 %
N GONORRHOEA DNA SPEC QL NAA+PROBE: NOT DETECTED
NEUTROPHILS # BLD AUTO: 5.92 X10(3)/MCL (ref 2.1–9.2)
NEUTROPHILS NFR BLD AUTO: 78.7 %
NRBC BLD AUTO-RTO: 0 % (ref 0–1)
PLATELET # BLD AUTO: 124 X10(3)/MCL (ref 140–371)
PLATELETS.RETICULATED NFR BLD AUTO: 3.2 % (ref 0.9–11.2)
PMV BLD AUTO: 9.6 FL (ref 9.4–12.4)
POTASSIUM SERPL-SCNC: 3.8 MMOL/L (ref 3.5–5.1)
PROT SERPL-MCNC: 5.4 GM/DL (ref 6.4–8.3)
RBC # BLD AUTO: 4.3 X10(6)/MCL (ref 4.7–6.1)
SODIUM SERPL-SCNC: 138 MMOL/L (ref 136–145)
T PALLIDUM AB SER QL: NONREACTIVE
VANCOMYCIN TROUGH SERPL-MCNC: 8.5 UG/ML (ref 15–20)
WBC # SPEC AUTO: 7.5 X10(3)/MCL (ref 4.5–11.5)

## 2022-12-28 PROCEDURE — 36000704 HC OR TIME LEV I 1ST 15 MIN: Performed by: SURGERY

## 2022-12-28 PROCEDURE — 25000003 PHARM REV CODE 250: Performed by: STUDENT IN AN ORGANIZED HEALTH CARE EDUCATION/TRAINING PROGRAM

## 2022-12-28 PROCEDURE — S4991 NICOTINE PATCH NONLEGEND: HCPCS | Performed by: STUDENT IN AN ORGANIZED HEALTH CARE EDUCATION/TRAINING PROGRAM

## 2022-12-28 PROCEDURE — 36000705 HC OR TIME LEV I EA ADD 15 MIN: Performed by: SURGERY

## 2022-12-28 PROCEDURE — 87205 SMEAR GRAM STAIN: CPT | Performed by: SURGERY

## 2022-12-28 PROCEDURE — 82784 ASSAY IGA/IGD/IGG/IGM EACH: CPT

## 2022-12-28 PROCEDURE — 85025 COMPLETE CBC W/AUTO DIFF WBC: CPT | Performed by: STUDENT IN AN ORGANIZED HEALTH CARE EDUCATION/TRAINING PROGRAM

## 2022-12-28 PROCEDURE — 87389 HIV-1 AG W/HIV-1&-2 AB AG IA: CPT | Performed by: INTERNAL MEDICINE

## 2022-12-28 PROCEDURE — 63600175 PHARM REV CODE 636 W HCPCS: Performed by: STUDENT IN AN ORGANIZED HEALTH CARE EDUCATION/TRAINING PROGRAM

## 2022-12-28 PROCEDURE — 86780 TREPONEMA PALLIDUM: CPT

## 2022-12-28 PROCEDURE — 63600175 PHARM REV CODE 636 W HCPCS

## 2022-12-28 PROCEDURE — 25000003 PHARM REV CODE 250

## 2022-12-28 PROCEDURE — 71000033 HC RECOVERY, INTIAL HOUR: Performed by: SURGERY

## 2022-12-28 PROCEDURE — 37000008 HC ANESTHESIA 1ST 15 MINUTES: Performed by: SURGERY

## 2022-12-28 PROCEDURE — 87070 CULTURE OTHR SPECIMN AEROBIC: CPT | Performed by: SURGERY

## 2022-12-28 PROCEDURE — 96372 THER/PROPH/DIAG INJ SC/IM: CPT | Performed by: STUDENT IN AN ORGANIZED HEALTH CARE EDUCATION/TRAINING PROGRAM

## 2022-12-28 PROCEDURE — 25000003 PHARM REV CODE 250: Performed by: NURSE ANESTHETIST, CERTIFIED REGISTERED

## 2022-12-28 PROCEDURE — 63600175 PHARM REV CODE 636 W HCPCS: Performed by: ANESTHESIOLOGY

## 2022-12-28 PROCEDURE — 80053 COMPREHEN METABOLIC PANEL: CPT | Performed by: STUDENT IN AN ORGANIZED HEALTH CARE EDUCATION/TRAINING PROGRAM

## 2022-12-28 PROCEDURE — 87075 CULTR BACTERIA EXCEPT BLOOD: CPT | Performed by: SURGERY

## 2022-12-28 PROCEDURE — 80202 ASSAY OF VANCOMYCIN: CPT | Performed by: STUDENT IN AN ORGANIZED HEALTH CARE EDUCATION/TRAINING PROGRAM

## 2022-12-28 PROCEDURE — 36415 COLL VENOUS BLD VENIPUNCTURE: CPT

## 2022-12-28 PROCEDURE — G0378 HOSPITAL OBSERVATION PER HR: HCPCS

## 2022-12-28 PROCEDURE — 63600175 PHARM REV CODE 636 W HCPCS: Performed by: NURSE ANESTHETIST, CERTIFIED REGISTERED

## 2022-12-28 PROCEDURE — 36415 COLL VENOUS BLD VENIPUNCTURE: CPT | Performed by: STUDENT IN AN ORGANIZED HEALTH CARE EDUCATION/TRAINING PROGRAM

## 2022-12-28 PROCEDURE — 11000001 HC ACUTE MED/SURG PRIVATE ROOM

## 2022-12-28 PROCEDURE — 37000009 HC ANESTHESIA EA ADD 15 MINS: Performed by: SURGERY

## 2022-12-28 PROCEDURE — 87591 N.GONORRHOEAE DNA AMP PROB: CPT | Performed by: PHYSICIAN ASSISTANT

## 2022-12-28 RX ORDER — KETOROLAC TROMETHAMINE 30 MG/ML
15 INJECTION, SOLUTION INTRAMUSCULAR; INTRAVENOUS EVERY 6 HOURS
Status: DISCONTINUED | OUTPATIENT
Start: 2022-12-28 | End: 2022-12-31 | Stop reason: HOSPADM

## 2022-12-28 RX ORDER — MORPHINE SULFATE 2 MG/ML
6 INJECTION, SOLUTION INTRAMUSCULAR; INTRAVENOUS EVERY 4 HOURS PRN
Status: DISCONTINUED | OUTPATIENT
Start: 2022-12-28 | End: 2022-12-31 | Stop reason: HOSPADM

## 2022-12-28 RX ORDER — ONDANSETRON 2 MG/ML
INJECTION INTRAMUSCULAR; INTRAVENOUS
Status: DISCONTINUED | OUTPATIENT
Start: 2022-12-28 | End: 2022-12-28

## 2022-12-28 RX ORDER — HYDROMORPHONE HYDROCHLORIDE 1 MG/ML
INJECTION, SOLUTION INTRAMUSCULAR; INTRAVENOUS; SUBCUTANEOUS
Status: COMPLETED
Start: 2022-12-28 | End: 2022-12-28

## 2022-12-28 RX ORDER — KETOROLAC TROMETHAMINE 30 MG/ML
INJECTION, SOLUTION INTRAMUSCULAR; INTRAVENOUS
Status: DISCONTINUED | OUTPATIENT
Start: 2022-12-28 | End: 2022-12-28

## 2022-12-28 RX ORDER — FENTANYL CITRATE 50 UG/ML
INJECTION, SOLUTION INTRAMUSCULAR; INTRAVENOUS
Status: DISCONTINUED | OUTPATIENT
Start: 2022-12-28 | End: 2022-12-28

## 2022-12-28 RX ORDER — CLINDAMYCIN PHOSPHATE 150 MG/ML
INJECTION, SOLUTION INTRAVENOUS
Status: DISCONTINUED | OUTPATIENT
Start: 2022-12-28 | End: 2022-12-28

## 2022-12-28 RX ORDER — PROPOFOL 10 MG/ML
VIAL (ML) INTRAVENOUS
Status: DISCONTINUED | OUTPATIENT
Start: 2022-12-28 | End: 2022-12-28

## 2022-12-28 RX ORDER — ROCURONIUM BROMIDE 10 MG/ML
INJECTION, SOLUTION INTRAVENOUS
Status: DISCONTINUED | OUTPATIENT
Start: 2022-12-28 | End: 2022-12-28

## 2022-12-28 RX ORDER — HYDROMORPHONE HYDROCHLORIDE 1 MG/ML
0.2 INJECTION, SOLUTION INTRAMUSCULAR; INTRAVENOUS; SUBCUTANEOUS EVERY 5 MIN PRN
Status: DISCONTINUED | OUTPATIENT
Start: 2022-12-28 | End: 2022-12-28

## 2022-12-28 RX ORDER — OXYCODONE HYDROCHLORIDE 5 MG/1
10 TABLET ORAL EVERY 6 HOURS PRN
Status: DISCONTINUED | OUTPATIENT
Start: 2022-12-28 | End: 2022-12-31 | Stop reason: HOSPADM

## 2022-12-28 RX ORDER — SODIUM CHLORIDE, SODIUM LACTATE, POTASSIUM CHLORIDE, CALCIUM CHLORIDE 600; 310; 30; 20 MG/100ML; MG/100ML; MG/100ML; MG/100ML
INJECTION, SOLUTION INTRAVENOUS CONTINUOUS
Status: DISCONTINUED | OUTPATIENT
Start: 2022-12-28 | End: 2022-12-28

## 2022-12-28 RX ORDER — LIDOCAINE HYDROCHLORIDE 10 MG/ML
1 INJECTION, SOLUTION EPIDURAL; INFILTRATION; INTRACAUDAL; PERINEURAL ONCE
Status: DISCONTINUED | OUTPATIENT
Start: 2022-12-28 | End: 2022-12-28

## 2022-12-28 RX ORDER — KETAMINE HYDROCHLORIDE 10 MG/ML
INJECTION, SOLUTION INTRAMUSCULAR; INTRAVENOUS
Status: DISCONTINUED | OUTPATIENT
Start: 2022-12-28 | End: 2022-12-28

## 2022-12-28 RX ORDER — ACETAMINOPHEN 500 MG
1000 TABLET ORAL EVERY 6 HOURS
Status: DISCONTINUED | OUTPATIENT
Start: 2022-12-28 | End: 2022-12-31 | Stop reason: HOSPADM

## 2022-12-28 RX ORDER — MIDAZOLAM HYDROCHLORIDE 1 MG/ML
1 INJECTION INTRAMUSCULAR; INTRAVENOUS ONCE AS NEEDED
Status: COMPLETED | OUTPATIENT
Start: 2022-12-28 | End: 2022-12-28

## 2022-12-28 RX ORDER — DIPHENHYDRAMINE HYDROCHLORIDE 50 MG/ML
6.25 INJECTION INTRAMUSCULAR; INTRAVENOUS ONCE AS NEEDED
Status: DISCONTINUED | OUTPATIENT
Start: 2022-12-28 | End: 2022-12-28

## 2022-12-28 RX ORDER — MIDAZOLAM HYDROCHLORIDE 1 MG/ML
INJECTION INTRAMUSCULAR; INTRAVENOUS
Status: DISPENSED
Start: 2022-12-28 | End: 2022-12-28

## 2022-12-28 RX ORDER — SUCCINYLCHOLINE CHLORIDE 20 MG/ML
INJECTION INTRAMUSCULAR; INTRAVENOUS
Status: DISCONTINUED | OUTPATIENT
Start: 2022-12-28 | End: 2022-12-28

## 2022-12-28 RX ORDER — LIDOCAINE HYDROCHLORIDE 20 MG/ML
INJECTION INTRAVENOUS
Status: DISCONTINUED | OUTPATIENT
Start: 2022-12-28 | End: 2022-12-28

## 2022-12-28 RX ORDER — OXYCODONE HYDROCHLORIDE 5 MG/1
5 TABLET ORAL EVERY 4 HOURS PRN
Status: DISCONTINUED | OUTPATIENT
Start: 2022-12-28 | End: 2022-12-30

## 2022-12-28 RX ORDER — MORPHINE SULFATE 2 MG/ML
4 INJECTION, SOLUTION INTRAMUSCULAR; INTRAVENOUS EVERY 4 HOURS PRN
Status: DISCONTINUED | OUTPATIENT
Start: 2022-12-28 | End: 2022-12-28

## 2022-12-28 RX ORDER — METOCLOPRAMIDE HYDROCHLORIDE 5 MG/ML
10 INJECTION INTRAMUSCULAR; INTRAVENOUS ONCE AS NEEDED
Status: DISCONTINUED | OUTPATIENT
Start: 2022-12-28 | End: 2022-12-28

## 2022-12-28 RX ORDER — DEXAMETHASONE SODIUM PHOSPHATE 4 MG/ML
INJECTION, SOLUTION INTRA-ARTICULAR; INTRALESIONAL; INTRAMUSCULAR; INTRAVENOUS; SOFT TISSUE
Status: DISCONTINUED | OUTPATIENT
Start: 2022-12-28 | End: 2022-12-28

## 2022-12-28 RX ADMIN — FENTANYL CITRATE 50 MCG: 50 INJECTION, SOLUTION INTRAMUSCULAR; INTRAVENOUS at 10:12

## 2022-12-28 RX ADMIN — ROCURONIUM BROMIDE 5 MG: 10 INJECTION, SOLUTION INTRAVENOUS at 10:12

## 2022-12-28 RX ADMIN — SODIUM CHLORIDE, POTASSIUM CHLORIDE, SODIUM LACTATE AND CALCIUM CHLORIDE: 600; 310; 30; 20 INJECTION, SOLUTION INTRAVENOUS at 09:12

## 2022-12-28 RX ADMIN — KETOROLAC TROMETHAMINE 15 MG: 30 INJECTION, SOLUTION INTRAMUSCULAR; INTRAVENOUS at 11:12

## 2022-12-28 RX ADMIN — SUCCINYLCHOLINE CHLORIDE 200 MG: 20 INJECTION, SOLUTION INTRAMUSCULAR; INTRAVENOUS at 10:12

## 2022-12-28 RX ADMIN — SODIUM CHLORIDE, POTASSIUM CHLORIDE, SODIUM LACTATE AND CALCIUM CHLORIDE: 600; 310; 30; 20 INJECTION, SOLUTION INTRAVENOUS at 12:12

## 2022-12-28 RX ADMIN — KETOROLAC TROMETHAMINE 30 MG: 30 INJECTION, SOLUTION INTRAMUSCULAR; INTRAVENOUS at 11:12

## 2022-12-28 RX ADMIN — HYDROMORPHONE HYDROCHLORIDE 0.2 MG: 1 INJECTION, SOLUTION INTRAMUSCULAR; INTRAVENOUS; SUBCUTANEOUS at 11:12

## 2022-12-28 RX ADMIN — ONDANSETRON 4 MG: 2 INJECTION INTRAMUSCULAR; INTRAVENOUS at 11:12

## 2022-12-28 RX ADMIN — VANCOMYCIN HYDROCHLORIDE 1750 MG: 1 INJECTION, POWDER, LYOPHILIZED, FOR SOLUTION INTRAVENOUS at 10:12

## 2022-12-28 RX ADMIN — ENOXAPARIN SODIUM 40 MG: 40 INJECTION SUBCUTANEOUS at 05:12

## 2022-12-28 RX ADMIN — NICOTINE 1 PATCH: 14 PATCH, EXTENDED RELEASE TRANSDERMAL at 08:12

## 2022-12-28 RX ADMIN — KETOROLAC TROMETHAMINE 15 MG: 30 INJECTION, SOLUTION INTRAMUSCULAR; INTRAVENOUS at 05:12

## 2022-12-28 RX ADMIN — KETAMINE HYDROCHLORIDE 25 MG: 10 INJECTION, SOLUTION INTRAMUSCULAR; INTRAVENOUS at 10:12

## 2022-12-28 RX ADMIN — DEXAMETHASONE SODIUM PHOSPHATE 4 MG: 4 INJECTION, SOLUTION INTRA-ARTICULAR; INTRALESIONAL; INTRAMUSCULAR; INTRAVENOUS; SOFT TISSUE at 10:12

## 2022-12-28 RX ADMIN — PROPOFOL 200 MG: 10 INJECTION, EMULSION INTRAVENOUS at 10:12

## 2022-12-28 RX ADMIN — CLINDAMYCIN PHOSPHATE 900 MG: 150 INJECTION, SOLUTION INTRAVENOUS at 10:12

## 2022-12-28 RX ADMIN — ACETAMINOPHEN 1000 MG: 500 TABLET ORAL at 01:12

## 2022-12-28 RX ADMIN — KETAMINE HYDROCHLORIDE 25 MG: 10 INJECTION, SOLUTION INTRAMUSCULAR; INTRAVENOUS at 11:12

## 2022-12-28 RX ADMIN — OXYCODONE HYDROCHLORIDE 10 MG: 5 TABLET ORAL at 01:12

## 2022-12-28 RX ADMIN — LEVOFLOXACIN 500 MG: 5 INJECTION, SOLUTION INTRAVENOUS at 06:12

## 2022-12-28 RX ADMIN — LIDOCAINE HYDROCHLORIDE 50 MG: 20 INJECTION INTRAVENOUS at 10:12

## 2022-12-28 RX ADMIN — FENTANYL CITRATE 25 MCG: 50 INJECTION, SOLUTION INTRAMUSCULAR; INTRAVENOUS at 11:12

## 2022-12-28 RX ADMIN — ACETAMINOPHEN 1000 MG: 500 TABLET ORAL at 05:12

## 2022-12-28 RX ADMIN — MORPHINE SULFATE 4 MG: 2 INJECTION, SOLUTION INTRAMUSCULAR; INTRAVENOUS at 07:12

## 2022-12-28 RX ADMIN — OXYCODONE HYDROCHLORIDE 10 MG: 5 TABLET ORAL at 09:12

## 2022-12-28 RX ADMIN — HYDROCODONE BITARTRATE AND ACETAMINOPHEN 1 TABLET: 10; 325 TABLET ORAL at 12:12

## 2022-12-28 RX ADMIN — ACETAMINOPHEN 1000 MG: 500 TABLET ORAL at 11:12

## 2022-12-28 RX ADMIN — MIDAZOLAM HYDROCHLORIDE 1 MG: 1 INJECTION, SOLUTION INTRAMUSCULAR; INTRAVENOUS at 09:12

## 2022-12-28 RX ADMIN — HYDROCODONE BITARTRATE AND ACETAMINOPHEN 1 TABLET: 10; 325 TABLET ORAL at 05:12

## 2022-12-28 NOTE — ANESTHESIA PREPROCEDURE EVALUATION
12/28/2022  Dimitry Lee is a 42 y.o., male with bipolar d/o and left UE abscess after tatoo  Denies symptoms ED      Pre-op Assessment          Review of Systems      Physical Exam  General: Well nourished    Airway:  Mallampati: II   Mouth Opening: Normal  TM Distance: Normal  Tongue: Normal  Neck ROM: Normal ROM    Dental:  Intact  Full beard  Chest/Lungs:  Clear to auscultation, Normal Respiratory Rate    Heart:  Rate: Normal  Rhythm: Regular Rhythm        Anesthesia Plan  Type of Anesthesia, risks & benefits discussed:    Anesthesia Type: Gen ETT  Intra-op Monitoring Plan: Standard ASA Monitors  Post Op Pain Control Plan: multimodal analgesia and IV/PO Opioids PRN  Induction:  IV  Airway Plan: Direct  Informed Consent: Informed consent signed with the Patient and all parties understand the risks and agree with anesthesia plan.  All questions answered.   ASA Score: 2  Day of Surgery Review of History & Physical: H&P Update referred to the surgeon/provider.I have interviewed and examined the patient. I have reviewed the patient's H&P dated: There are no significant changes. H&P completed by Anesthesiologist.  Anesthesia Plan Notes: Use Sux    Ready For Surgery From Anesthesia Perspective.     .

## 2022-12-28 NOTE — TRANSFER OF CARE
"Anesthesia Transfer of Care Note    Patient: Dimitry Altmanhuiarsh    Procedure(s) Performed: Procedure(s) (LRB):  INCISION AND DRAINAGE, UPPER EXTREMITY (Left)    Patient location: PACU    Anesthesia Type: general    Transport from OR: Transported from OR on room air with adequate spontaneous ventilation    Post pain: adequate analgesia    Post assessment: no apparent anesthetic complications and tolerated procedure well    Post vital signs: stable    Level of consciousness: sedated    Nausea/Vomiting: no nausea/vomiting    Complications: none    Transfer of care protocol was followedComments: Report to CHAPARRO Basilio      Last vitals:   Visit Vitals  /70   Pulse 78   Temp 36 °C (96.8 °F) (Temporal)   Resp 20   Ht 5' 10" (1.778 m)   Wt 96.6 kg (212 lb 15.4 oz)   SpO2 100%   BMI 30.56 kg/m²     "

## 2022-12-28 NOTE — PROGRESS NOTES
[facility]/General Surgery  History & Physical    Interval     JADA LAND HDS  Mild pain over L elbow  NPO   Levofloxacin &Vancomycin        OBJECTIVE:     Vital Signs:  Temp: 98 °F (36.7 °C) (12/28/22 0408)  Pulse: 78 (12/28/22 0408)  Resp: 18 (12/28/22 0502)  BP: 109/72 (12/28/22 0408)  SpO2: 97 % (12/28/22 0408)    Physical Exam:  General: well developed, well nourished, no distress  HEENT: normocephalic, atraumatic, hearing grossly normal bilaterally  Cardiovascular: regular rate    Extremities:  Left elbow eschar, notable cellulitis and erythema.  Abdomen: soft, non-tender to palpation, no distention, no masses/hernias  Psych/Behavioral:  Alert and oriented, appropriate affect.      Laboratory:  Labs Reviewed   C-REACTIVE PROTEIN - Abnormal; Notable for the following components:       Result Value    C-Reactive Protein 284.20 (*)     All other components within normal limits   COMPREHENSIVE METABOLIC PANEL - Abnormal; Notable for the following components:    Albumin Level 3.0 (*)     Globulin 3.9 (*)     Albumin/Globulin Ratio 0.8 (*)     All other components within normal limits   SEDIMENTATION RATE, AUTOMATED - Abnormal; Notable for the following components:    Sed Rate 27 (*)     All other components within normal limits   URINALYSIS, REFLEX TO URINE CULTURE - Abnormal; Notable for the following components:    Color, UA Orange (*)     Appearance, UA Hazy (*)     Protein, UA 1+ (*)     Ketones, UA Trace (*)     Bilirubin, UA 1+ (*)     Urobilinogen, UA +4 (*)     Leukocyte Esterase, UA 25 (*)     WBC, UA 51-99 (*)     WBC Clumps, UA Few (*)     Squamous Epithelial Cells, UA Trace (*)     Mucous, UA Many (*)     Hyaline Casts, UA >20 (*)     All other components within normal limits   DRUG SCREEN, URINE (BEAKER) - Abnormal; Notable for the following components:    Amphetamines, Urine Positive (*)     Cannabinoids, Urine Positive (*)     Opiates, Urine Positive (*)     All other components within normal limits     Narrative:     Cut off concentrations:                    Amphetamines - 1000 ng/ml                  Barbiturates - 200 ng/ml                  Benzodiazepine - 200 ng/ml                  Cannabinoids (THC) - 50 ng/ml                  Cocaine - 300 ng/ml                  Fentanyl - 1.0 ng/ml                  MDMA - 500 ng/ml                  Opiates - 300 ng/ml                   Phencyclidine (PCP) - 25 ng/ml                                    Specimen submitted for drug analysis and tested for pH and specific gravity in order to evaluate sample integrity. Suspect tampering if specific gravity is <1.003 and/or pH is not within the range of 4.5 - 8.0                  False negatives may result form substances such as bleach added to urine.                  False positives may result for the presence of a substance with similar chemical structure to the drug or its metabolite.                                    This test provides only a PRELIMINARY analytical test result. A more specific alternate chemical method must be used in order to obtain a confirmed analytical result. Gas chromatography/mass spectrometry (GC/MS) is the preferred confirmatory method. Other chemical confirmation methods are available. Clinical consideration and professional judgement should be applied to any drug of abuse test result, particularly when preliminary positive results are used.                                    Positive results will be confirmed only at the physicians request. Unconfirmed screening results are to be used only for medical purposes (treatment).                                        CBC WITH DIFFERENTIAL - Abnormal; Notable for the following components:    RDW 11.5 (*)     Platelet 134 (*)     Neut # 9.90 (*)     All other components within normal limits   LACTIC ACID, PLASMA - Normal   WOUND CULTURE ([facility])   CULTURE, URINE   CHLAMYDIA/GONORRHOEAE(GC), PCR   BLOOD CULTURE [facility]   BLOOD CULTURE [facility]   CBC W/ AUTO  DIFFERENTIAL    Narrative:     The following orders were created for panel order CBC Auto Differential.                  Procedure                               Abnormality         Status                                     ---------                               -----------         ------                                     CBC with Differential[[id number]]        Abnormal            Final result                                                 Please view results for these tests on the individual orders.   EXTRA TUBES    Narrative:     The following orders were created for panel order EXTRA TUBES.                  Procedure                               Abnormality         Status                                     ---------                               -----------         ------                                     Light Blue Top Hold[[id number]]                              In process                                 Red Top Hold[[id number]]                                     In process                                 Gold Top Hold[[id number]]                                    In process                                 Pink Top Hold[[id number]]                                    In process                                                   Please view results for these tests on the individual orders.   LIGHT BLUE TOP HOLD   RED TOP HOLD   GOLD TOP HOLD   PINK TOP HOLD       Diagnostic Results:  Imaging Results              MRI Elbow Joint W WO Contrast Left (Final result)  Result time 12/27/22 15:04:05      Final result by Morteza Daniel MD (12/27/22 15:04:05)                   Impression:      Fluid collection posterior subcutaneous adipose tissue in the region of the olecranon bursa which is somewhat infiltrative predominantly with some areas demonstrating early organization measuring 8.2 x 5.8 x 1.7 cm.  This is likely predominantly and non drainable fluid collection.    Extensive severe cellulitis throughout the  visualized elbow.    Moderate intramuscular edema involving the flexor digitorum profundus muscle and to a lesser degree the flexor carpi ulnaris muscle suggesting myositis.    Degenerative change radiocapitellar joint.      Electronically signed by: Siomara Daniel MD  Date:    12/27/2022  Time:    15:04               Narrative:    EXAMINATION:  MRI ELBOW W WO CONTRAST LEFT    CLINICAL HISTORY:  Septic arthritis suspected, elbow, xray done;  Pain in left elbow    TECHNIQUE:  MRI left elbow performed before and after intravenous contrast using routine protocol.    COMPARISON:  Radiograph same day    FINDINGS:  Moderate intensity edema noted within the flexor carpi ulnaris muscle and flexor digitorum profundus proximally within the field of view.  No gross muscular tissue disruption or tendon disruption.  No intramuscular fluid.  There is a region of somewhat infiltrative fluid with some areas faisal sing in the posterior subcutaneous adipose tissue near the region of the olecranon bursa measuring 8.2 x 5.8 x 1.8 cm.  Severe generalized cellulitis noted.  Bones demonstrate normal signal without fracture or destructive lesion.  There is moderate degenerative change of the radiocapitellar joint including joint space narrowing, periarticular cartilage thinning, and patchy areas of periarticular subchondral fibrocystic change.  No significant joint effusion.  Bones otherwise demonstrate normal signal.  Biceps and brachialis tendons are intact and demonstrate normal signal.  Common extensor tendon normal.  Ulnar collateral ligament and radial collateral ligaments grossly intact.  Triceps tendon normal.                                       X-Ray Elbow Complete Left (Final result)  Result time 12/27/22 07:27:06      Final result by Ashleigh Oneil MD (12/27/22 07:27:06)                   Impression:      No acute bony abnormality.      Electronically signed by: Ashleigh Oneil  Date:    12/27/2022  Time:    07:27                Narrative:    EXAMINATION:  XR ELBOW COMPLETE 3 VIEW LEFT    CLINICAL HISTORY:  Cellulitis of left upper limb    COMPARISON:  None.    FINDINGS:  There is no acute fracture or malalignment.  There is no olecranon enthesophyte.  There is no evidence of an elbow joint effusion.  Soft tissue swelling is noted.                                      ASSESSMENT:     42-year-old male with left arm cellulitis, no organized fluid collection on MRI.  Surgery consulted for drainage.    PLAN:     - OR today for I&D of L elbow  - NPO until after procedure  - Continue abx  - MMPC    Gonzalo Tipton MD  [facility] General Surgery PGY1  4:28 PM

## 2022-12-28 NOTE — PROGRESS NOTES
[facility] Medicine Wards   Progress Note     Resident Team: [facility] Medicine List 3  Attending Physician: Denny Choi MD  Resident:    Intern: DO Kunal  Hospital Length of Stay: 0 days    Subjective:      Brief HPI:  Dimitry Lee is a 42 y.o. male with a history of bipolar & general anxiety disorder who presented to the ED on 12/27/2022  with a primary complaint of L elbow abscess and cellulitis. Pt reports waking up Saturday 12/24/22 with a blister on his elbow which popped resulting in an abscess. Has several tattoos on his L arm with the most recent one being 2 weeks ago resulting in small blisters for which he was on bactrim for 3 weeks at time for about 3 months. Also reports having the flu last week and had lost about 10 pnds since then. Had one episode of diarrhea yesterday associated with nausea and fever of 103. He is allergic to keflex ( rash reactions). Denies IV drug use but admits to [facility]. UDS positive for amphetamines, Cannabinoids, phencyclidine.  He reports decreased ROM of the L arm with pain rated 9/10. Denies SOB, chest pain, abdominal pain, vomiting.     Upon arrival to the ED, VS were stable, labs significant for Plt 134, .2, sed rate 27, UDS + for amphetamines, Cannabinoids, phencyclidine.    Significant Events/Hospital Course:   I&D 12/28/22    24 hour Interval History:   Seen today prior to I&D, pt reported no overnight events. Still c/o pain at left elbow. Reported a lot of infectious from simple blisters, will perform workup of this.       Review of Systems:  Review of Systems   Constitutional:  Negative for chills and fever.   HENT:  Negative for congestion and sore throat.    Eyes:  Negative for blurred vision and double vision.   Respiratory:  Negative for cough, sputum production and shortness of breath.    Cardiovascular:  Negative for chest pain, palpitations and leg swelling.   Gastrointestinal:  Negative for abdominal pain and vomiting.   Genitourinary:  Negative  for dysuria.   Musculoskeletal:  Positive for joint pain (left elbow pain). Negative for back pain and myalgias.   Neurological:  Negative for dizziness, focal weakness, loss of consciousness and headaches.        Objective:     Vital Signs (Most Recent):  Temp: 97.9 °F (36.6 °C) (12/28/22 1125)  Pulse: 85 (12/28/22 1206)  Resp: 16 (12/28/22 1142)  BP: (!) 146/90 (12/28/22 1140)  SpO2: 97 % (12/28/22 1206)   Vital Signs (24h Range):  Temp:  [96.8 °F (36 °C)-99.4 °F (37.4 °C)] 97.9 °F (36.6 °C)  Pulse:  [74-89] 85  Resp:  [14-20] 16  SpO2:  [97 %-100 %] 97 %  BP: (108-146)/(70-90) 146/90       Physical Examination:  Physical Exam  Vitals and nursing note reviewed.   Constitutional:       General: He is not in acute distress.  HENT:      Head: Normocephalic and atraumatic.   Eyes:      Pupils: Pupils are equal, round, and reactive to light.   Cardiovascular:      Rate and Rhythm: Normal rate and regular rhythm.      Pulses: Normal pulses.      Heart sounds: Normal heart sounds. No murmur heard.    No friction rub. No gallop.   Pulmonary:      Effort: Pulmonary effort is normal. No respiratory distress.      Breath sounds: Normal breath sounds. No wheezing or rales.   Abdominal:      General: Abdomen is flat. There is no distension.      Palpations: Abdomen is soft.      Tenderness: There is no abdominal tenderness.   Musculoskeletal:      Cervical back: Neck supple.      Right lower leg: No edema.      Left lower leg: No edema.      Comments: LUE: tender, swollen, erythematous, small blisters extending from the shoulder to the arm, multiple tattoos noted, decreased ROM.  peeling of skin, drainage. Sensation intact, radial pulses present    L elbow: large purulent abscess noted    Skin:     General: Skin is warm.      Capillary Refill: Capillary refill takes less than 2 seconds.      Findings: Erythema present.      Comments: Circular lesion on L elbow with drainage and redness extending to mid arm   Neurological:       General: No focal deficit present.      Mental Status: He is alert and oriented to person, place, and time.       Laboratory:  Most Recent Data:  Recent Lab Results         12/28/22  0948   12/28/22  0600   12/28/22  0355        Immature Platelet Fraction     3.2       Albumin/Globulin Ratio     0.7       Albumin     2.3       Alkaline Phosphatase     61       ALT     10       AST     9       Baso #     0.01       Basophil %     0.1       BILIRUBIN TOTAL     0.4       BUN     9.0       Calcium     8.5       Chlamydia trachomatis PCR   Not Detected         Chloride     106       CO2     25       Creatinine     0.75       eGFR     >60       Eos #     0.09       Eosinophil %     1.2       Globulin, Total     3.1       Glucose     94       Hematocrit     37.2       Hemoglobin     13.3       HIV     Nonreactive       Immature Grans (Abs)     0.02       Immature Granulocytes     0.3       Lymph #     0.76       LYMPH %     10.1       MCH     30.9       MCHC     35.8       MCV     86.5       Mono #     0.72       Mono %     9.6       MPV     9.6       N. gonorrhea PCR   Not Detected         Neut #     5.92       Neut %     78.7       nRBC     0.0       Platelets     124       Potassium     3.8       PROTEIN TOTAL     5.4       RBC     4.30       RDW     11.8       Sodium     138       Syphilis Antibody Nonreactive           WBC     7.5                 Microbiology Data Reviewed: yes  Pertinent Findings:  Microbiology Results (last 7 days)       Procedure Component Value Units Date/Time    Anaerobic Culture [[id number]] Collected: 12/28/22 1055    Order Status: Sent Specimen: Abscess from Elbow, Left Updated: 12/28/22 1107    Gram Stain [[id number]] Collected: 12/28/22 1055    Order Status: Sent Specimen: Abscess from Elbow, Left Updated: 12/28/22 1107    Wound Culture [[id number]] Collected: 12/28/22 1055    Order Status: Sent Specimen: Abscess from Elbow, Left Updated: 12/28/22 1107    Wound Culture [[id number]]     Order  Status: Sent Specimen: Abscess from Elbow     Chlamydia/GC, PCR [[id number]]  (Normal) Collected: 12/28/22 0600    Order Status: Completed Specimen: Urine Updated: 12/28/22 0915     Chlamydia trachomatis PCR Not Detected     N. gonorrhea PCR Not Detected    Narrative:      The Xpert CT/NG test, performed on the GeneXpert system is a qualitative in vitro real-time polymerase chain reaction (PCR) test for the automated detected and differentiation for genomic DNA from Chlamydia trachomatis (CT) and/or Neisseria gonorrhoeae (NG).    Wound Culture [[id number]]  (Abnormal) Collected: 12/27/22 0735    Order Status: Completed Specimen: Abscess from Elbow, Left Updated: 12/28/22 0736     Wound Culture Moderate Staphylococcus aureus    Urine culture [[id number]] Collected: 12/27/22 0755    Order Status: Completed Specimen: Urine Updated: 12/28/22 0703     Urine Culture No Growth At 24 Hours    Blood Culture [[id number]] Collected: 12/27/22 0912    Order Status: Resulted Specimen: Blood from Arm, Right Updated: 12/27/22 0915    Blood Culture [[id number]] Collected: 12/27/22 0912    Order Status: Resulted Specimen: Blood from Hand, Right Updated: 12/27/22 0914              Other Results:  EKG (my interpretation):   No results found for this or any previous visit.      Radiology:  Imaging Results              MRI Elbow Joint W WO Contrast Left (Final result)  Result time 12/27/22 15:04:05      Final result by Morteza Daniel MD (12/27/22 15:04:05)                   Impression:      Fluid collection posterior subcutaneous adipose tissue in the region of the olecranon bursa which is somewhat infiltrative predominantly with some areas demonstrating early organization measuring 8.2 x 5.8 x 1.7 cm.  This is likely predominantly and non drainable fluid collection.    Extensive severe cellulitis throughout the visualized elbow.    Moderate intramuscular edema involving the flexor digitorum profundus muscle and to a lesser degree the flexor  carpi ulnaris muscle suggesting myositis.    Degenerative change radiocapitellar joint.      Electronically signed by: Siomara Daniel MD  Date:    12/27/2022  Time:    15:04               Narrative:    EXAMINATION:  MRI ELBOW W WO CONTRAST LEFT    CLINICAL HISTORY:  Septic arthritis suspected, elbow, xray done;  Pain in left elbow    TECHNIQUE:  MRI left elbow performed before and after intravenous contrast using routine protocol.    COMPARISON:  Radiograph same day    FINDINGS:  Moderate intensity edema noted within the flexor carpi ulnaris muscle and flexor digitorum profundus proximally within the field of view.  No gross muscular tissue disruption or tendon disruption.  No intramuscular fluid.  There is a region of somewhat infiltrative fluid with some areas faisal sing in the posterior subcutaneous adipose tissue near the region of the olecranon bursa measuring 8.2 x 5.8 x 1.8 cm.  Severe generalized cellulitis noted.  Bones demonstrate normal signal without fracture or destructive lesion.  There is moderate degenerative change of the radiocapitellar joint including joint space narrowing, periarticular cartilage thinning, and patchy areas of periarticular subchondral fibrocystic change.  No significant joint effusion.  Bones otherwise demonstrate normal signal.  Biceps and brachialis tendons are intact and demonstrate normal signal.  Common extensor tendon normal.  Ulnar collateral ligament and radial collateral ligaments grossly intact.  Triceps tendon normal.                                       X-Ray Elbow Complete Left (Final result)  Result time 12/27/22 07:27:06      Final result by Ashleigh Oneil MD (12/27/22 07:27:06)                   Impression:      No acute bony abnormality.      Electronically signed by: Ashleigh Oneil  Date:    12/27/2022  Time:    07:27               Narrative:    EXAMINATION:  XR ELBOW COMPLETE 3 VIEW LEFT    CLINICAL HISTORY:  Cellulitis of left upper  limb    COMPARISON:  None.    FINDINGS:  There is no acute fracture or malalignment.  There is no olecranon enthesophyte.  There is no evidence of an elbow joint effusion.  Soft tissue swelling is noted.                                      Current Medications:     Infusions:   lactated ringers 50 mL/hr at 12/28/22 1129    lactated ringers          Scheduled:   acetaminophen  1,000 mg Oral Q6H    enoxaparin  40 mg Subcutaneous Daily    ketorolac  15 mg Intravenous Q6H    levoFLOXacin  500 mg Intravenous Q24H    LIDOcaine (PF) 10 mg/ml (1%)  1 mL Intradermal Once    midazolam        nicotine  1 patch Transdermal Daily    vancomycin (VANCOCIN) IVPB  1,750 mg Intravenous Q12H        PRN:  acetaminophen, dextrose 10%, dextrose 10%, glucagon (human recombinant), glucose, glucose, morphine, naloxone, ondansetron, oxyCODONE, oxyCODONE, sodium chloride 0.9%    Antibiotics and Day Number of Therapy:  Antibiotics (From admission, onward)      Start     Stop Route Frequency Ordered    12/27/22 2215  vancomycin (VANCOCIN) 1,750 mg in sodium chloride 0.9% 500 mL IVPB         -- IV Every 12 hours (non-standard times) 12/27/22 0903    12/27/22 1900  levoFLOXacin 500 mg/100 mL IVPB 500 mg         -- IV Every 24 hours (non-standard times) 12/27/22 1746                Intake/Output Summary (Last 24 hours) at 12/28/2022 1207  Last data filed at 12/28/2022 1129  Gross per 24 hour   Intake 840 ml   Output 1620 ml   Net -780 ml       Lines/Drains/Airways       Peripheral Intravenous Line  Duration                  Peripheral IV - Single Lumen 12/28/22 0834 20 G Anterior;Right Forearm <1 day                      Assessment & Plan:     L elbow abcess/cellulitis       -Sed rate 27; .2       - Afebrile on admission, low suspicion for septic joint       - XR negative for fracture or bony abnormality       - consider Ortho referral pending results form I&D        -start on vanc        -MM pain control with norco pRN       -Getting  surgery to evaluate, I&D today  -Follow MRI: Fluid collection posterior subcutaneous adipose tissue in the region of the olecranon bursa which is somewhat infiltrative predominantly with some areas demonstrating early organization measuring 8.2 x 5.8 x 1.7 cm.  This is likely predominantly and non drainable fluid collection. Extensive severe cellulitis throughout the visualized elbow. Moderate intramuscular edema involving the flexor digitorum profundus muscle and to a lesser degree the flexor carpi ulnaris muscle suggesting myositis. Degenerative change radiocapitellar joint.       -LR @125ml/hr       -blood, wound, urine cx pending       -Intra-op wound cx pending       -MRSA PCR pending, Immunoglobulin panel pending given chronic hx of abscess     Bipolar disorder/anxiety       - On many meds, will resume     Thrombocytopenia       - Plt 134 on admission, repeat 124       - Likely 2/2 to bactrim       - Following cbc in morning       - will monitor             CODE STATUS: full code  Access: PIV  Antibiotics: vanc and levo  Diet: adult regular  DVT Prophylaxis: lovenox 40 subQ Q24hrs  GI Prophylaxis: none  Fluids: LR @125ml/hr      Disposition: On Vanc and levofloxacin, I&D by surgery, considering ortho consult pending results from surgery, IVF with LR@125ml/hr      Laurent Syed DO  [facility] Medicine PGY-1

## 2022-12-28 NOTE — PT/OT/SLP PROGRESS
Physical Therapy      Patient Name:  Dimitry Lee   MRN:  [id number]    Patient not seen today secondary to Off the floor for procedure/surgery. Pt undergoing an I and D to the L UE.  Will need new PT orders.  Will follow-up once new PT orders are received.

## 2022-12-28 NOTE — PLAN OF CARE
HPI: Patient is a 41yo M w/ PMH of bipolar disorder and CLARE, presents to ED for 4 day history of worsening swelling, redness, purulent drainage from left elbow. Had recent tattoo to [employer] around 2 weeks ago. Reports history of frequent skin infection and small abscess formation following his tattoos, has been on bactrim intermittently the past few months. Had one fever Tmax 103F at home yesterday. Denies any chest pain, SOB, cough, n/v, diarrhea. Denies IV drug use.     Physical Exam  Constitutional:       General: He is not in acute distress.     Appearance: He is not toxic-appearing.   Cardiovascular:      Rate and Rhythm: Normal rate and regular rhythm.      Heart sounds: No murmur heard.    No gallop.   Pulmonary:      Effort: Pulmonary effort is normal. No respiratory distress.      Breath sounds: Normal breath sounds. No wheezing or rales.   Abdominal:      General: Abdomen is flat. Bowel sounds are normal. There is no distension.      Palpations: Abdomen is soft.      Tenderness: There is no abdominal tenderness.   Musculoskeletal:      Right lower leg: No edema.      Left lower leg: No edema.      Comments: Left elbow with diffuse erythema, tender, purulence, desquamation. See images    Skin:     General: Skin is warm and dry.      Capillary Refill: Capillary refill takes less than 2 seconds.   Neurological:      General: No focal deficit present.      Mental Status: He is alert and oriented to person, place, and time.     A/P:   LUE cellulitis/abscess   Bipolar, CLARE     - Admit to med unit    - Start Vancomycin   - Will add Levaquin given cephalosporin allergy and recent tattoo, can likely switch in AM given low cross reactivity with cephalosporins and penicillins  - IVF   - Low concern for septic joint, attempted Ortho evaluation however not in house today  - Will get advanced imaging and call surgery for recommendations   - Consider Ortho eval in AM   - Blood and wound cultures   - Continue to monitor for  improvement   - Lovenox for DVT   - Resume home bipolar meds

## 2022-12-28 NOTE — PROGRESS NOTES
Inpatient Nutrition Assessment    Admit Date: 12/27/2022   Total duration of encounter: 1 day     Nutrition Recommendation/Prescription     Continue Regular diet as tolerated  Will send Boost Original BID, to provide 240kcals and 10g pro per serving  Will send Riccardo BID to promote wound healing  Consider daily MVI  Monitor po intake, wt, labs    Communication of Recommendations: reviewed with patient/caregiver    Nutrition Assessment     Malnutrition Assessment/Nutrition-Focused Physical Exam    Malnutrition in the context of acute illness or injury  Degree of Malnutrition: severe malnutrition  Energy Intake: </= 50% of estimated energy requirement for >/= 5 days  Interpretation of Weight Loss: >2% in 1 week  Body Fat:does not meet criteria  Area of Body Fat Loss: does not meet criteria  Muscle Mass Loss: does not meet criteria  Area of Muscle Mass Loss: does not meet criteria  Fluid Accumulation: does not meet criteria  Edema: no edema present and does not meet criteria   Reduced  Strength: unable to obtain  A minimum of two characteristics is recommended for diagnosis of either severe or non-severe malnutrition.    Chart Review    Reason Seen: continuous nutrition monitoring    Malnutrition Screening Tool Results   Have you recently lost weight without trying?: No  Have you been eating poorly because of a decreased appetite?: No   MST Score: 0     Diagnosis: L elbow abcess/cellulitis s/p I&D 12/28    Relevant Medical History: bipolar & general anxiety disorder    Nutrition-Related Medications: vanco, LR 125mL/hr  Calorie Containing IV Medications: no significant kcals from medications at this time    Nutrition-Related Labs: 12/28-Pro Tot 5.4, Alb 2.3, GFR >60      Diet/PN Order: Diet Adult Regular  Oral Supplement Order: none  Tube Feeding Order: none  Appetite/Oral Intake: good/% of meals  Factors Affecting Nutritional Intake: none identified  Food/Worship/Cultural Preferences: none reported  Food  "Allergies: no known food allergies    Skin Integrity: other (see comments) (left elbow wound w/ purulent drainage)  Wound(s):   Left elbow abscess     Comments  : Visited pt, fly present in room. Pt s/p I&D to left elbow abscess; pt ok to add jacob BID. Pt reports good po intake of lunch. No GI complaints reported. Pt reports decreased appetite x 6 days pta d/t flu; reports ~10# wt loss as well; equates to ~4% wt loss x 1wk, significant. Pt ok to add Boost BID.      Anthropometrics    Height: 5' 10" (177.8 cm) Height Method: Stated  Last Weight: 96.6 kg (212 lb 15.4 oz) (22 0616) Weight Method: Standard Scale  BMI (Calculated): 30.6  BMI Classification: obese grade I (BMI 30-34.9)        Ideal Body Weight (IBW), Male: 166 lb     % Ideal Body Weight, Male (lb): 128.3 %                 Usual Body Weight (UBW), k kg (UBW ~220# per pt)  % Usual Body Weight: 96.8     Usual Weight Provided By: patient    Wt Readings from Last 5 Encounters:   22 96.6 kg (212 lb 15.4 oz)   12/10/21 93.9 kg (207 lb 0.2 oz)     Weight Change(s) Since Admission:  Admit Weight: 96.6 kg (212 lb 15.4 oz) (22 0616)  : UBW ~220#; ~4% wt loss x 1wk, significant    Estimated Needs    Weight Used For Calorie Calculations: 96.6 kg (212 lb 15.4 oz)  Energy Calorie Requirements (kcal): 7779-4163 kcals (20-22 kcal/kg)  Energy Need Method: Kcal/kg  Weight Used For Protein Calculations: 96.6 kg (212 lb 15.4 oz)  Protein Requirements: 97g (1.0g/kg)  Fluid Requirements (mL): 1932-2125mL (1mL/kcal)  Temp: 97.9 °F (36.6 °C)       Enteral Nutrition    Patient not receiving enteral nutrition at this time.    Parenteral Nutrition    Patient not receiving parenteral nutrition support at this time.    Evaluation of Received Nutrient Intake    Calories: meeting estimated needs  Protein: meeting estimated needs    Patient Education    Not applicable.    Nutrition Diagnosis     PES: Malnutrition related to inadequate energy intake as " evidenced by <50% intake >5 days; >2% wt loss x 1wk. (new)    Interventions/Goals     Intervention(s): general/healthful diet, commercial beverage, multivitamin/mineral supplement therapy, and collaboration with other providers  Goal: Meet greater than 75% of nutritional needs by follow-up. (new)    Monitoring & Evaluation     Dietitian will monitor food and beverage intake, weight, electrolyte/renal panel, and glucose/endocrine profile.  Nutrition Risk/Follow-Up: moderate (follow-up in 3-5 days)   Please consult if re-assessment needed sooner.

## 2022-12-28 NOTE — ANESTHESIA POSTPROCEDURE EVALUATION
Anesthesia Post Evaluation    Patient: Dimitry Lee    Procedure(s) Performed: Procedure(s) (LRB):  INCISION AND DRAINAGE, UPPER EXTREMITY (Left)    Final Anesthesia Type: general      Patient location during evaluation: PACU  Patient participation: Yes- Able to Participate  Level of consciousness: awake and alert  Post-procedure vital signs: reviewed and stable  Pain management: adequate  Airway patency: patent    PONV status at discharge: No PONV  Anesthetic complications: no      Cardiovascular status: hemodynamically stable  Respiratory status: unassisted  Hydration status: euvolemic  Follow-up not needed.          Vitals Value Taken Time   /90 12/28/22 1130   Temp 36.6 °C (97.9 °F) 12/28/22 1125   Pulse 79 12/28/22 1130   Resp 16 12/28/22 1142   SpO2 99 % 12/28/22 1130         No case tracking events are documented in the log.      Pain/Adrienne Score: Pain Rating Prior to Med Admin: 7 (12/28/2022 11:42 AM)  Pain Rating Post Med Admin: 0 (12/28/2022  8:26 AM)  Adrienne Score: 10 (12/28/2022 11:36 AM)

## 2022-12-28 NOTE — OP NOTE
[facility] - [address] [place] [address] Surg Telemetry  Operative Note    SUMMARY     Surgery Date: 12/28/2022     Surgeon(s) and Role:     * Levon Gardner MD - Primary     * Diomedes Sanchez MD - Resident - Assisting     * Gonzalo Tomlin MD - Resident - Assisting        Pre-op Diagnosis:    Post-Op Diagnosis Codes:     * Cellulitis of left elbow [L03.114]     * Cutaneous abscess of left upper extremity [L02.414]    Post-op Diagnosis:    * Cellulitis of left elbow [L03.114]     * Cutaneous abscess of left upper extremity [L02.414]    Procedure(s) (LRB):  INCISION AND DRAINAGE, UPPER EXTREMITY (Left)    Anesthesia: Choice    Operative Findings: Pus pocket in subcutaneous tissue of L elbow    The patient was intubated without complications. Patient was preped and draped in the standard fashion. Timeout was completed. An incision was made a 15 blade over the dorsal aspect of the left elbow. After incision pus and blood drainage was encountered. Two wound cultures were collected and sent to the lab. Hemostat was used for further extraction of pus. The wound was then irrigated and cleansed with normal saline and hydrogen peroxide. Bovie electrocautery was used to obtain hemostasis. Half-inch iodoform was used to pack the wound.  Wound was dressed with 4X4, wrapped around with kerlix and ACE. The patient tolerated the procedure without any complications.     Dr. Gardner was present for the entirety of the procedure    Estimated Blood Loss:    Estimated Blood Loss has not been documented. EBL = 20.         Specimens:   Specimen (24h ago, onward)      None            [id number]    Gonzalo Tomlin MD  12/28/2022 11:13 AM

## 2022-12-28 NOTE — ANESTHESIA PROCEDURE NOTES
Intubation    Date/Time: 12/28/2022 10:41 AM  Performed by: Donovan Humphrey CRNA  Authorized by: Ute Molina MD     Intubation:     Induction:  Intravenous    Intubated:  Postinduction    Mask Ventilation:  Easy with oral airway    Attempts:  1    Attempted By:  CRNA    Method of Intubation:  Direct    Blade:  Krishnamurthy 3    Laryngeal View Grade: Grade IIA - cords partially seen      Difficult Airway Encountered?: No      Complications:  None    Airway Device:  Oral endotracheal tube    Airway Device Size:  7.5    Style/Cuff Inflation:  Cuffed (inflated to minimal occlusive pressure)    Inflation Amount (mL):  6    Tube secured:  21    Secured at:  The lips    Placement Verified By:  Capnometry    Complicating Factors:  None    Findings Post-Intubation:  BS equal bilateral and atraumatic/condition of teeth unchanged

## 2022-12-29 LAB
ALBUMIN SERPL-MCNC: 2.3 G/DL (ref 3.5–5)
ALBUMIN/GLOB SERPL: 0.8 RATIO (ref 1.1–2)
ALP SERPL-CCNC: 65 UNIT/L (ref 40–150)
ALT SERPL-CCNC: 10 UNIT/L (ref 0–55)
AST SERPL-CCNC: 8 UNIT/L (ref 5–34)
BACTERIA SPEC CULT: ABNORMAL
BACTERIA UR CULT: NO GROWTH
BASOPHILS # BLD AUTO: 0.01 X10(3)/MCL (ref 0–0.2)
BASOPHILS NFR BLD AUTO: 0.2 %
BILIRUBIN DIRECT+TOT PNL SERPL-MCNC: 0.2 MG/DL
BUN SERPL-MCNC: 12.8 MG/DL (ref 8.9–20.6)
CALCIUM SERPL-MCNC: 8.7 MG/DL (ref 8.4–10.2)
CHLORIDE SERPL-SCNC: 109 MMOL/L (ref 98–107)
CO2 SERPL-SCNC: 25 MMOL/L (ref 22–29)
CREAT SERPL-MCNC: 0.67 MG/DL (ref 0.73–1.18)
EOSINOPHIL # BLD AUTO: 0 X10(3)/MCL (ref 0–0.9)
EOSINOPHIL NFR BLD AUTO: 0 %
ERYTHROCYTE [DISTWIDTH] IN BLOOD BY AUTOMATED COUNT: 11.8 % (ref 11.6–14.4)
GFR SERPLBLD CREATININE-BSD FMLA CKD-EPI: >60 MLS/MIN/1.73/M2
GLOBULIN SER-MCNC: 3 GM/DL (ref 2.4–3.5)
GLUCOSE SERPL-MCNC: 120 MG/DL (ref 74–100)
GRAM STN SPEC: NORMAL
GRAM STN SPEC: NORMAL
HCT VFR BLD AUTO: 35.3 % (ref 42–52)
HGB BLD-MCNC: 12.3 GM/DL (ref 14–18)
IMM GRANULOCYTES # BLD AUTO: 0.03 X10(3)/MCL (ref 0–0.04)
IMM GRANULOCYTES NFR BLD AUTO: 0.5 %
LYMPHOCYTES # BLD AUTO: 0.6 X10(3)/MCL (ref 0.6–4.6)
LYMPHOCYTES NFR BLD AUTO: 9.9 %
MCH RBC QN AUTO: 30.6 PG
MCHC RBC AUTO-ENTMCNC: 34.8 MG/DL (ref 33–36)
MCV RBC AUTO: 87.8 FL (ref 80–94)
MONOCYTES # BLD AUTO: 0.45 X10(3)/MCL (ref 0.1–1.3)
MONOCYTES NFR BLD AUTO: 7.4 %
MRSA PCR SCRN (OHS): DETECTED
NEUTROPHILS # BLD AUTO: 4.99 X10(3)/MCL (ref 2.1–9.2)
NEUTROPHILS NFR BLD AUTO: 82 %
NRBC BLD AUTO-RTO: 0 % (ref 0–1)
PLATELET # BLD AUTO: 146 X10(3)/MCL (ref 140–371)
PMV BLD AUTO: 10.1 FL (ref 9.4–12.4)
POTASSIUM SERPL-SCNC: 4.3 MMOL/L (ref 3.5–5.1)
PROT SERPL-MCNC: 5.3 GM/DL (ref 6.4–8.3)
RBC # BLD AUTO: 4.02 X10(6)/MCL (ref 4.7–6.1)
SODIUM SERPL-SCNC: 141 MMOL/L (ref 136–145)
VANCOMYCIN TROUGH SERPL-MCNC: 11 UG/ML (ref 15–20)
WBC # SPEC AUTO: 6.1 X10(3)/MCL (ref 4.5–11.5)

## 2022-12-29 PROCEDURE — 25000003 PHARM REV CODE 250: Performed by: STUDENT IN AN ORGANIZED HEALTH CARE EDUCATION/TRAINING PROGRAM

## 2022-12-29 PROCEDURE — 63600175 PHARM REV CODE 636 W HCPCS: Performed by: STUDENT IN AN ORGANIZED HEALTH CARE EDUCATION/TRAINING PROGRAM

## 2022-12-29 PROCEDURE — 80202 ASSAY OF VANCOMYCIN: CPT | Performed by: PHYSICIAN ASSISTANT

## 2022-12-29 PROCEDURE — 36415 COLL VENOUS BLD VENIPUNCTURE: CPT | Performed by: PHYSICIAN ASSISTANT

## 2022-12-29 PROCEDURE — 11000001 HC ACUTE MED/SURG PRIVATE ROOM

## 2022-12-29 PROCEDURE — 27000207 HC ISOLATION

## 2022-12-29 PROCEDURE — 36415 COLL VENOUS BLD VENIPUNCTURE: CPT | Performed by: STUDENT IN AN ORGANIZED HEALTH CARE EDUCATION/TRAINING PROGRAM

## 2022-12-29 PROCEDURE — 97161 PT EVAL LOW COMPLEX 20 MIN: CPT

## 2022-12-29 PROCEDURE — 87641 MR-STAPH DNA AMP PROBE: CPT | Performed by: STUDENT IN AN ORGANIZED HEALTH CARE EDUCATION/TRAINING PROGRAM

## 2022-12-29 PROCEDURE — S4991 NICOTINE PATCH NONLEGEND: HCPCS | Performed by: STUDENT IN AN ORGANIZED HEALTH CARE EDUCATION/TRAINING PROGRAM

## 2022-12-29 PROCEDURE — 85025 COMPLETE CBC W/AUTO DIFF WBC: CPT | Performed by: STUDENT IN AN ORGANIZED HEALTH CARE EDUCATION/TRAINING PROGRAM

## 2022-12-29 PROCEDURE — 80053 COMPREHEN METABOLIC PANEL: CPT | Performed by: STUDENT IN AN ORGANIZED HEALTH CARE EDUCATION/TRAINING PROGRAM

## 2022-12-29 RX ORDER — MUPIROCIN 20 MG/G
OINTMENT TOPICAL 2 TIMES DAILY
Status: DISCONTINUED | OUTPATIENT
Start: 2022-12-29 | End: 2022-12-31 | Stop reason: HOSPADM

## 2022-12-29 RX ADMIN — MORPHINE SULFATE 6 MG: 2 INJECTION, SOLUTION INTRAMUSCULAR; INTRAVENOUS at 07:12

## 2022-12-29 RX ADMIN — VANCOMYCIN HYDROCHLORIDE 1750 MG: 1 INJECTION, POWDER, LYOPHILIZED, FOR SOLUTION INTRAVENOUS at 10:12

## 2022-12-29 RX ADMIN — KETOROLAC TROMETHAMINE 15 MG: 30 INJECTION, SOLUTION INTRAMUSCULAR; INTRAVENOUS at 12:12

## 2022-12-29 RX ADMIN — ACETAMINOPHEN 1000 MG: 500 TABLET ORAL at 05:12

## 2022-12-29 RX ADMIN — LEVOFLOXACIN 500 MG: 5 INJECTION, SOLUTION INTRAVENOUS at 06:12

## 2022-12-29 RX ADMIN — OXYCODONE HYDROCHLORIDE 10 MG: 5 TABLET ORAL at 06:12

## 2022-12-29 RX ADMIN — ACETAMINOPHEN 1000 MG: 500 TABLET ORAL at 06:12

## 2022-12-29 RX ADMIN — KETOROLAC TROMETHAMINE 15 MG: 30 INJECTION, SOLUTION INTRAMUSCULAR; INTRAVENOUS at 11:12

## 2022-12-29 RX ADMIN — KETOROLAC TROMETHAMINE 15 MG: 30 INJECTION, SOLUTION INTRAMUSCULAR; INTRAVENOUS at 06:12

## 2022-12-29 RX ADMIN — NICOTINE 1 PATCH: 14 PATCH, EXTENDED RELEASE TRANSDERMAL at 08:12

## 2022-12-29 RX ADMIN — KETOROLAC TROMETHAMINE 15 MG: 30 INJECTION, SOLUTION INTRAMUSCULAR; INTRAVENOUS at 05:12

## 2022-12-29 RX ADMIN — ACETAMINOPHEN 1000 MG: 500 TABLET ORAL at 12:12

## 2022-12-29 RX ADMIN — OXYCODONE HYDROCHLORIDE 10 MG: 5 TABLET ORAL at 03:12

## 2022-12-29 RX ADMIN — OXYCODONE HYDROCHLORIDE 10 MG: 5 TABLET ORAL at 09:12

## 2022-12-29 RX ADMIN — ACETAMINOPHEN 1000 MG: 500 TABLET ORAL at 11:12

## 2022-12-29 RX ADMIN — VANCOMYCIN HYDROCHLORIDE 1750 MG: 1 INJECTION, POWDER, LYOPHILIZED, FOR SOLUTION INTRAVENOUS at 11:12

## 2022-12-29 RX ADMIN — SODIUM CHLORIDE, POTASSIUM CHLORIDE, SODIUM LACTATE AND CALCIUM CHLORIDE: 600; 310; 30; 20 INJECTION, SOLUTION INTRAVENOUS at 03:12

## 2022-12-29 RX ADMIN — ENOXAPARIN SODIUM 40 MG: 40 INJECTION SUBCUTANEOUS at 05:12

## 2022-12-29 RX ADMIN — MUPIROCIN: 20 OINTMENT TOPICAL at 09:12

## 2022-12-29 RX ADMIN — MORPHINE SULFATE 6 MG: 2 INJECTION, SOLUTION INTRAMUSCULAR; INTRAVENOUS at 10:12

## 2022-12-29 NOTE — PROGRESS NOTES
[facility]/General Surgery  History & Physical    Interval   S/P I&D day #1  JADA LAND   Pulse at 45 this AM, non symptomatic  Mild pain over L elbow, dressings in place  Regular diet   Levofloxacin &Vancomycin        OBJECTIVE:     Vital Signs:  Temp: 97.6 °F (36.4 °C) (12/29/22 0808)  Pulse: (!) 48 (12/29/22 0808)  Resp: 18 (12/29/22 0930)  BP: 110/68 (12/29/22 0808)  SpO2: 96 % (12/29/22 0808)    Physical Exam:  General: well developed, well nourished, no distress  HEENT: normocephalic, atraumatic, hearing grossly normal bilaterally  Cardiovascular: regular rate    Extremities:  Left elbow dressings in place, c/d/i  Abdomen: soft, non-tender to palpation, no distention, no masses/hernias  Psych/Behavioral:  Alert and oriented, appropriate affect.      Laboratory:  Labs Reviewed   C-REACTIVE PROTEIN - Abnormal; Notable for the following components:       Result Value    C-Reactive Protein 284.20 (*)     All other components within normal limits   COMPREHENSIVE METABOLIC PANEL - Abnormal; Notable for the following components:    Albumin Level 3.0 (*)     Globulin 3.9 (*)     Albumin/Globulin Ratio 0.8 (*)     All other components within normal limits   SEDIMENTATION RATE, AUTOMATED - Abnormal; Notable for the following components:    Sed Rate 27 (*)     All other components within normal limits   URINALYSIS, REFLEX TO URINE CULTURE - Abnormal; Notable for the following components:    Color, UA Orange (*)     Appearance, UA Hazy (*)     Protein, UA 1+ (*)     Ketones, UA Trace (*)     Bilirubin, UA 1+ (*)     Urobilinogen, UA +4 (*)     Leukocyte Esterase, UA 25 (*)     WBC, UA 51-99 (*)     WBC Clumps, UA Few (*)     Squamous Epithelial Cells, UA Trace (*)     Mucous, UA Many (*)     Hyaline Casts, UA >20 (*)     All other components within normal limits   DRUG SCREEN, URINE (BEAKER) - Abnormal; Notable for the following components:    Amphetamines, Urine Positive (*)     Cannabinoids, Urine Positive (*)      Opiates, Urine Positive (*)     All other components within normal limits    Narrative:     Cut off concentrations:    Amphetamines - 1000 ng/ml  Barbiturates - 200 ng/ml  Benzodiazepine - 200 ng/ml  Cannabinoids (THC) - 50 ng/ml  Cocaine - 300 ng/ml  Fentanyl - 1.0 ng/ml  MDMA - 500 ng/ml  Opiates - 300 ng/ml   Phencyclidine (PCP) - 25 ng/ml    Specimen submitted for drug analysis and tested for pH and specific gravity in order to evaluate sample integrity. Suspect tampering if specific gravity is <1.003 and/or pH is not within the range of 4.5 - 8.0  False negatives may result form substances such as bleach added to urine.  False positives may result for the presence of a substance with similar chemical structure to the drug or its metabolite.    This test provides only a PRELIMINARY analytical test result. A more specific alternate chemical method must be used in order to obtain a confirmed analytical result. Gas chromatography/mass spectrometry (GC/MS) is the preferred confirmatory method. Other chemical confirmation methods are available. Clinical consideration and professional judgement should be applied to any drug of abuse test result, particularly when preliminary positive results are used.    Positive results will be confirmed only at the physicians request. Unconfirmed screening results are to be used only for medical purposes (treatment).        CBC WITH DIFFERENTIAL - Abnormal; Notable for the following components:    RDW 11.5 (*)     Platelet 134 (*)     Neut # 9.90 (*)     All other components within normal limits   LACTIC ACID, PLASMA - Normal   CBC W/ AUTO DIFFERENTIAL    Narrative:     The following orders were created for panel order CBC Auto Differential.  Procedure                               Abnormality         Status                     ---------                               -----------         ------                     CBC with Differential[[id number]]        Abnormal            Final  result                 Please view results for these tests on the individual orders.   EXTRA TUBES    Narrative:     The following orders were created for panel order EXTRA TUBES.  Procedure                               Abnormality         Status                     ---------                               -----------         ------                     Light Blue Top Hold[[id number]]                              In process                 Red Top Hold[[id number]]                                     In process                 Gold Top Hold[[id number]]                                    In process                 Pink Top Hold[[id number]]                                    In process                   Please view results for these tests on the individual orders.   LIGHT BLUE TOP HOLD   RED TOP HOLD   GOLD TOP HOLD   PINK TOP HOLD       Diagnostic Results:  Imaging Results              MRI Elbow Joint W WO Contrast Left (Final result)  Result time 12/27/22 15:04:05      Final result by Morteza Daniel MD (12/27/22 15:04:05)                   Impression:      Fluid collection posterior subcutaneous adipose tissue in the region of the olecranon bursa which is somewhat infiltrative predominantly with some areas demonstrating early organization measuring 8.2 x 5.8 x 1.7 cm.  This is likely predominantly and non drainable fluid collection.    Extensive severe cellulitis throughout the visualized elbow.    Moderate intramuscular edema involving the flexor digitorum profundus muscle and to a lesser degree the flexor carpi ulnaris muscle suggesting myositis.    Degenerative change radiocapitellar joint.      Electronically signed by: Siomara Daniel MD  Date:    12/27/2022  Time:    15:04               Narrative:    EXAMINATION:  MRI ELBOW W WO CONTRAST LEFT    CLINICAL HISTORY:  Septic arthritis suspected, elbow, xray done;  Pain in left elbow    TECHNIQUE:  MRI left elbow performed before and after intravenous contrast using  routine protocol.    COMPARISON:  Radiograph same day    FINDINGS:  Moderate intensity edema noted within the flexor carpi ulnaris muscle and flexor digitorum profundus proximally within the field of view.  No gross muscular tissue disruption or tendon disruption.  No intramuscular fluid.  There is a region of somewhat infiltrative fluid with some areas faisal sing in the posterior subcutaneous adipose tissue near the region of the olecranon bursa measuring 8.2 x 5.8 x 1.8 cm.  Severe generalized cellulitis noted.  Bones demonstrate normal signal without fracture or destructive lesion.  There is moderate degenerative change of the radiocapitellar joint including joint space narrowing, periarticular cartilage thinning, and patchy areas of periarticular subchondral fibrocystic change.  No significant joint effusion.  Bones otherwise demonstrate normal signal.  Biceps and brachialis tendons are intact and demonstrate normal signal.  Common extensor tendon normal.  Ulnar collateral ligament and radial collateral ligaments grossly intact.  Triceps tendon normal.                                       X-Ray Elbow Complete Left (Final result)  Result time 12/27/22 07:27:06      Final result by Ashleigh Oneil MD (12/27/22 07:27:06)                   Impression:      No acute bony abnormality.      Electronically signed by: Ashleigh Oneil  Date:    12/27/2022  Time:    07:27               Narrative:    EXAMINATION:  XR ELBOW COMPLETE 3 VIEW LEFT    CLINICAL HISTORY:  Cellulitis of left upper limb    COMPARISON:  None.    FINDINGS:  There is no acute fracture or malalignment.  There is no olecranon enthesophyte.  There is no evidence of an elbow joint effusion.  Soft tissue swelling is noted.                                      ASSESSMENT:     42-year-old male with left arm cellulitis, no organized fluid collection on MRI.  Surgery consulted for drainage.    PLAN:     - Change dressings today  - Wound cx positive for S.  Aureus  - Continue abx  - MMPC  - Rest of care per primary    Gonzalo Tipton MD  [facility] General Surgery PGY1    Pt has had half of packing removed from elbow.  Skin overlying elbow/abscess cavity appears  somewhat thinned and slightly ecchymotic or ischemic.  Will re-examine in am and possibly bring back to OR.

## 2022-12-29 NOTE — PROGRESS NOTES
[facility] Medicine Wards   Progress Note     Resident Team: [facility] Medicine List 3  Attending Physician: Denny Choi MD  Resident:    Intern: DO Kunal  Hospital Length of Stay: 0 days    Subjective:      Brief HPI:  Dimitry Lee is a 42 y.o. male with a history of bipolar & general anxiety disorder who presented to the ED on 12/27/2022  with a primary complaint of L elbow abscess and cellulitis. Pt reports waking up Saturday 12/24/22 with a blister on his elbow which popped resulting in an abscess. Has several tattoos on his L arm with the most recent one being 2 weeks ago resulting in small blisters for which he was on bactrim for 3 weeks at time for about 3 months. Also reports having the flu last week and had lost about 10 pnds since then. Had one episode of diarrhea yesterday associated with nausea and fever of 103. He is allergic to keflex ( rash reactions). Denies IV drug use but admits to [facility]. UDS positive for amphetamines, Cannabinoids, phencyclidine.  He reports decreased ROM of the L arm with pain rated 9/10. Denies SOB, chest pain, abdominal pain, vomiting.     Upon arrival to the ED, VS were stable, labs significant for Plt 134, .2, sed rate 27, UDS + for amphetamines, Cannabinoids, phencyclidine.    Significant Events/Hospital Course:   I&D 12/28/22    24 hour Interval History:   Seen this AM, NAD and no acute events overnight. S/p I&D yesterday. Would wrapped with kerlix. Pt reports decreased pain and more ROM today. Possible wash-out with surgery tmr. Will continue to monitor.       Review of Systems:  Review of Systems   Constitutional:  Negative for chills and fever.   HENT:  Negative for congestion and sore throat.    Eyes:  Negative for blurred vision and double vision.   Respiratory:  Negative for cough, sputum production and shortness of breath.    Cardiovascular:  Negative for chest pain, palpitations and leg swelling.   Gastrointestinal:  Negative for abdominal pain and  vomiting.   Genitourinary:  Negative for dysuria.   Musculoskeletal:  Positive for joint pain (left elbow pain). Negative for back pain and myalgias.   Neurological:  Negative for dizziness, focal weakness, loss of consciousness and headaches.        Objective:     Vital Signs (Most Recent):  Temp: 97.7 °F (36.5 °C) (12/29/22 1141)  Pulse: 62 (12/29/22 1141)  Resp: 18 (12/29/22 1031)  BP: 118/75 (12/29/22 1141)  SpO2: 98 % (12/29/22 1141)   Vital Signs (24h Range):  Temp:  [97.4 °F (36.3 °C)-97.9 °F (36.6 °C)] 97.7 °F (36.5 °C)  Pulse:  [45-85] 62  Resp:  [16-18] 18  SpO2:  [89 %-100 %] 98 %  BP: ()/(62-78) 118/75       Physical Examination:  Physical Exam  Vitals and nursing note reviewed.   Constitutional:       General: He is not in acute distress.  HENT:      Head: Normocephalic and atraumatic.   Eyes:      Pupils: Pupils are equal, round, and reactive to light.   Cardiovascular:      Rate and Rhythm: Normal rate and regular rhythm.      Pulses: Normal pulses.      Heart sounds: Normal heart sounds. No murmur heard.    No friction rub. No gallop.   Pulmonary:      Effort: Pulmonary effort is normal. No respiratory distress.      Breath sounds: Normal breath sounds. No wheezing or rales.   Abdominal:      General: Abdomen is flat. There is no distension.      Palpations: Abdomen is soft.      Tenderness: There is no abdominal tenderness.   Musculoskeletal:      Cervical back: Neck supple.      Right lower leg: No edema.      Left lower leg: No edema.      Comments: LUE: decreased swelling, with decreased tenderness present. Multiple tattoos noted.      Skin:     General: Skin is warm.      Capillary Refill: Capillary refill takes less than 2 seconds.      Findings: Erythema present.      Comments: Left elbow with erythema much improved from prior. Wrapped in Kerlix. No drainage appreciated.    Neurological:      General: No focal deficit present.      Mental Status: He is alert and oriented to person,  place, and time.       Laboratory:  Most Recent Data:  Recent Lab Results         12/29/22  0952   12/29/22  0336   12/28/22  2048   12/28/22  1301        Albumin/Globulin Ratio   0.8           Albumin   2.3           Alkaline Phosphatase   65           ALT   10           AST   8           Baso #   0.01           Basophil %   0.2           BILIRUBIN TOTAL   0.2           BUN   12.8           Calcium   8.7           Chloride   109           CO2   25           Creatinine   0.67           eGFR   >60           Eos #   0.00           Eosinophil %   0.0           Globulin, Total   3.0           Glucose   120           Hematocrit   35.3           Hemoglobin   12.3           IgA       45.0       IgG       414.00       IgM       25.0       Immature Grans (Abs)   0.03           Immature Granulocytes   0.5           Lymph #   0.60           LYMPH %   9.9           MCH   30.6           MCHC   34.8           MCV   87.8           Mono #   0.45           Mono %   7.4           MPV   10.1           Neut #   4.99           Neut %   82.0           nRBC   0.0           Platelets   146           Potassium   4.3           PROTEIN TOTAL   5.3           RBC   4.02           RDW   11.8           Sodium   141           Vancomycin-Trough 11.0     8.5         WBC   6.1                     Microbiology Data Reviewed: yes  Pertinent Findings:  Microbiology Results (last 7 days)       Procedure Component Value Units Date/Time    Gram Stain [[id number]] Collected: 12/28/22 1055    Order Status: Completed Specimen: Abscess from Elbow, Left Updated: 12/29/22 1109     GRAM STAIN Rare WBC observed      Rare Gram positive cocci    Wound Culture [[id number]]  (Abnormal)  (Susceptibility) Collected: 12/27/22 0735    Order Status: Completed Specimen: Abscess from Elbow, Left Updated: 12/29/22 1019     Wound Culture Moderate Methicillin resistant Staphylococcus aureus    Wound Culture [[id number]]  (Abnormal) Collected: 12/28/22 1055    Order Status:  Completed Specimen: Abscess from Elbow, Left Updated: 12/29/22 0941     Wound Culture Moderate Staphylococcus aureus    Urine culture [[id number]] Collected: 12/27/22 0755    Order Status: Completed Specimen: Urine Updated: 12/29/22 0823     Urine Culture No Growth    Blood Culture [[id number]]  (Normal) Collected: 12/27/22 0912    Order Status: Completed Specimen: Blood from Arm, Right Updated: 12/28/22 2001     CULTURE, BLOOD ([facility]) No Growth At 24 Hours    Blood Culture [[id number]]  (Normal) Collected: 12/27/22 0912    Order Status: Completed Specimen: Blood from Hand, Right Updated: 12/28/22 1601     CULTURE, BLOOD ([facility]) No Growth At 24 Hours    Anaerobic Culture [[id number]] Collected: 12/28/22 1055    Order Status: Sent Specimen: Abscess from Elbow, Left Updated: 12/28/22 1107    Wound Culture [[id number]]     Order Status: Sent Specimen: Abscess from Elbow     Chlamydia/GC, PCR [[id number]]  (Normal) Collected: 12/28/22 0600    Order Status: Completed Specimen: Urine Updated: 12/28/22 0915     Chlamydia trachomatis PCR Not Detected     N. gonorrhea PCR Not Detected    Narrative:      The Xpert CT/NG test, performed on the GeneXpert system is a qualitative in vitro real-time polymerase chain reaction (PCR) test for the automated detected and differentiation for genomic DNA from Chlamydia trachomatis (CT) and/or Neisseria gonorrhoeae (NG).              Other Results:  EKG (my interpretation):   No results found for this or any previous visit.      Radiology:  Imaging Results              MRI Elbow Joint W WO Contrast Left (Final result)  Result time 12/27/22 15:04:05      Final result by Morteza Daniel MD (12/27/22 15:04:05)                   Impression:      Fluid collection posterior subcutaneous adipose tissue in the region of the olecranon bursa which is somewhat infiltrative predominantly with some areas demonstrating early organization measuring 8.2 x 5.8 x 1.7 cm.  This is likely predominantly and non  drainable fluid collection.    Extensive severe cellulitis throughout the visualized elbow.    Moderate intramuscular edema involving the flexor digitorum profundus muscle and to a lesser degree the flexor carpi ulnaris muscle suggesting myositis.    Degenerative change radiocapitellar joint.      Electronically signed by: Siomara Daniel MD  Date:    12/27/2022  Time:    15:04               Narrative:    EXAMINATION:  MRI ELBOW W WO CONTRAST LEFT    CLINICAL HISTORY:  Septic arthritis suspected, elbow, xray done;  Pain in left elbow    TECHNIQUE:  MRI left elbow performed before and after intravenous contrast using routine protocol.    COMPARISON:  Radiograph same day    FINDINGS:  Moderate intensity edema noted within the flexor carpi ulnaris muscle and flexor digitorum profundus proximally within the field of view.  No gross muscular tissue disruption or tendon disruption.  No intramuscular fluid.  There is a region of somewhat infiltrative fluid with some areas faisal sing in the posterior subcutaneous adipose tissue near the region of the olecranon bursa measuring 8.2 x 5.8 x 1.8 cm.  Severe generalized cellulitis noted.  Bones demonstrate normal signal without fracture or destructive lesion.  There is moderate degenerative change of the radiocapitellar joint including joint space narrowing, periarticular cartilage thinning, and patchy areas of periarticular subchondral fibrocystic change.  No significant joint effusion.  Bones otherwise demonstrate normal signal.  Biceps and brachialis tendons are intact and demonstrate normal signal.  Common extensor tendon normal.  Ulnar collateral ligament and radial collateral ligaments grossly intact.  Triceps tendon normal.                                       X-Ray Elbow Complete Left (Final result)  Result time 12/27/22 07:27:06      Final result by Ashleigh Oneil MD (12/27/22 07:27:06)                   Impression:      No acute bony  abnormality.      Electronically signed by: Ashleigh Oneil  Date:    12/27/2022  Time:    07:27               Narrative:    EXAMINATION:  XR ELBOW COMPLETE 3 VIEW LEFT    CLINICAL HISTORY:  Cellulitis of left upper limb    COMPARISON:  None.    FINDINGS:  There is no acute fracture or malalignment.  There is no olecranon enthesophyte.  There is no evidence of an elbow joint effusion.  Soft tissue swelling is noted.                                      Current Medications:     Infusions:         Scheduled:   acetaminophen  1,000 mg Oral Q6H    enoxaparin  40 mg Subcutaneous Daily    ketorolac  15 mg Intravenous Q6H    levoFLOXacin  500 mg Intravenous Q24H    nicotine  1 patch Transdermal Daily    vancomycin (VANCOCIN) IVPB  1,750 mg Intravenous Q12H        PRN:  acetaminophen, dextrose 10%, dextrose 10%, glucagon (human recombinant), glucose, glucose, morphine, naloxone, ondansetron, oxyCODONE, oxyCODONE, sodium chloride 0.9%    Antibiotics and Day Number of Therapy:  Antibiotics (From admission, onward)      Start     Stop Route Frequency Ordered    12/27/22 2215  vancomycin (VANCOCIN) 1,750 mg in sodium chloride 0.9% 500 mL IVPB         -- IV Every 12 hours (non-standard times) 12/27/22 0903    12/27/22 1900  levoFLOXacin 500 mg/100 mL IVPB 500 mg         -- IV Every 24 hours (non-standard times) 12/27/22 1746                Intake/Output Summary (Last 24 hours) at 12/29/2022 1148  Last data filed at 12/29/2022 0800  Gross per 24 hour   Intake 824 ml   Output 1800 ml   Net -976 ml         Lines/Drains/Airways       Peripheral Intravenous Line  Duration                  Peripheral IV - Single Lumen 12/28/22 0834 20 G Anterior;Right Forearm 1 day                      Assessment & Plan:     L elbow abcess/cellulitis       -Sed rate 27; .2       - Afebrile on admission, low suspicion for septic joint       - XR negative for fracture or bony abnormality       - consider Ortho referral pending results form I&D         -start on vanc        -MM pain control with norco pRN       -Getting surgery to evaluate, I&D today  -Follow MRI: Fluid collection posterior subcutaneous adipose tissue in the region of the olecranon bursa which is somewhat infiltrative predominantly with some areas demonstrating early organization measuring 8.2 x 5.8 x 1.7 cm.  This is likely predominantly and non drainable fluid collection. Extensive severe cellulitis throughout the visualized elbow. Moderate intramuscular edema involving the flexor digitorum profundus muscle and to a lesser degree the flexor carpi ulnaris muscle suggesting myositis. Degenerative change radiocapitellar joint.       -LR @125ml/hr       -blood, wound, urine cx pending       -Intra-op wound cx pending       -MRSA PCR pending,        -Immunoglobulin panel: IgG 414, IgM 25, IgA 45.0. Consider follow up with Allergy and Immunology outpatient      Bipolar disorder/anxiety       - On many meds, will resume     Thrombocytopenia, improving       - Plt 134 on admission, today at 146       - Likely 2/2 to bactrim       - Following cbc in morning       - will monitor             CODE STATUS: full code  Access: PIV  Antibiotics: vanc and levo  Diet: adult regular  DVT Prophylaxis: lovenox 40 subQ Q24hrs  GI Prophylaxis: none  Fluids: none      Disposition: On Vanc and levofloxacin, I&D by surgery 12/28/22; possible wash-out with surgery tomorrow. NPO overnight.     Laurent Syed DO  [facility] PGY-1

## 2022-12-29 NOTE — PROGRESS NOTES
Pharmacokinetic Assessment Follow Up: IV Vancomycin    Vancomycin serum concentration assessment(s):    The trough level was drawn correctly and can be used to guide therapy at this time. The measurement is within the desired definitive target range of 10 to 15 mcg/mL.    Vancomycin Regimen Plan:    Continue regimen to Vancomycin 1750 mg IV every 12 hours with next serum trough concentration measured at 60 minutes prior to 1100 dose on 12/31    Drug levels (last 3 results):  Recent Labs   Lab Result Units 12/28/22  2048 12/29/22  0952   Vancomycin Trough ug/ml 8.5* 11.0*       Pharmacy will continue to follow and monitor vancomycin.    Please contact pharmacy at extension [phone] for questions regarding this assessment.    Thank you for the consult,   Manuela Mckeon       Patient brief summary:  Dimitry Lee is a 42 y.o. male initiated on antimicrobial therapy with IV Vancomycin for treatment of skin & soft tissue infection    The patient's current regimen is vancomycin 1750mg q 12 h     Drug Allergies:   Review of patient's allergies indicates:   Allergen Reactions    Abilify [aripiprazole]     Prednisone     Keflex [cephalexin] Rash       Actual Body Weight:   96.6kg    Renal Function:   Estimated Creatinine Clearance: 164 mL/min (A) (based on SCr of 0.67 mg/dL (L)).,     Dialysis Method (if applicable):  N/A    CBC (last 72 hours):  Recent Labs   Lab Result Units 12/27/22  0717 12/28/22  0355 12/29/22  0336   WBC x10(3)/mcL 11.5 7.5 6.1   Hgb gm/dL 15.7 13.3* 12.3*   Hct % 44.5 37.2* 35.3*   Platelet x10(3)/mcL 134* 124* 146   Mono % % 6.8 9.6 7.4   Eos % % 0.3 1.2 0.0   Basophil % % 0.3 0.1 0.2       Metabolic Panel (last 72 hours):  Recent Labs   Lab Result Units 12/27/22  0717 12/27/22  0755 12/28/22  0355 12/29/22  0336   Sodium Level mmol/L 138  --  138 141   Potassium Level mmol/L 3.8  --  3.8 4.3   Chloride mmol/L 101  --  106 109*   Carbon Dioxide mmol/L 28  --  25 25   Glucose Level mg/dL 99  --  94  120*   Glucose, UA mg/dL  --  Normal  --   --    Blood Urea Nitrogen mg/dL 9.8  --  9.0 12.8   Creatinine mg/dL 0.95  --  0.75 0.67*   Albumin Level g/dL 3.0*  --  2.3* 2.3*   Bilirubin Total mg/dL 0.7  --  0.4 0.2   Alkaline Phosphatase unit/L 79  --  61 65   Aspartate Aminotransferase unit/L 12  --  9 8   Alanine Aminotransferase unit/L 16  --  10 10       Vancomycin Administrations:  vancomycin given in the last 96 hours                     vancomycin (VANCOCIN) 1,750 mg in sodium chloride 0.9% 500 mL IVPB (mg) 1,750 mg New Bag 12/28/22 2228     1,750 mg Bolus  1031     1,750 mg New Bag 12/27/22 2248    vancomycin (VANCOCIN) 2,000 mg in sodium chloride 0.9% 500 mL IVPB (mg) 2,000 mg New Bag 12/27/22 1025                    Microbiologic Results:  Microbiology Results (last 7 days)       Procedure Component Value Units Date/Time    Wound Culture [[id number]]  (Abnormal)  (Susceptibility) Collected: 12/27/22 0735    Order Status: Completed Specimen: Abscess from Elbow, Left Updated: 12/29/22 1019     Wound Culture Moderate Methicillin resistant Staphylococcus aureus    Wound Culture [[id number]]  (Abnormal) Collected: 12/28/22 1055    Order Status: Completed Specimen: Abscess from Elbow, Left Updated: 12/29/22 0941     Wound Culture Moderate Staphylococcus aureus    Urine culture [[id number]] Collected: 12/27/22 0755    Order Status: Completed Specimen: Urine Updated: 12/29/22 0823     Urine Culture No Growth    Blood Culture [[id number]]  (Normal) Collected: 12/27/22 0912    Order Status: Completed Specimen: Blood from Arm, Right Updated: 12/28/22 2001     CULTURE, BLOOD ([facility]) No Growth At 24 Hours    Blood Culture [[id number]]  (Normal) Collected: 12/27/22 0912    Order Status: Completed Specimen: Blood from Hand, Right Updated: 12/28/22 1601     CULTURE, BLOOD ([facility]) No Growth At 24 Hours    Anaerobic Culture [[id number]] Collected: 12/28/22 1055    Order Status: Sent Specimen: Abscess from Elbow, Left Updated:  12/28/22 1107    Gram Stain [[id number]] Collected: 12/28/22 1055    Order Status: Sent Specimen: Abscess from Elbow, Left Updated: 12/28/22 1107    Wound Culture [[id number]]     Order Status: Sent Specimen: Abscess from Elbow     Chlamydia/GC, PCR [[id number]]  (Normal) Collected: 12/28/22 0600    Order Status: Completed Specimen: Urine Updated: 12/28/22 0915     Chlamydia trachomatis PCR Not Detected     N. gonorrhea PCR Not Detected    Narrative:      The Xpert CT/NG test, performed on the GeneXpert system is a qualitative in vitro real-time polymerase chain reaction (PCR) test for the automated detected and differentiation for genomic DNA from Chlamydia trachomatis (CT) and/or Neisseria gonorrhoeae (NG).

## 2022-12-29 NOTE — PT/OT/SLP EVAL
Physical Therapy Evaluation and Discharge Note    Patient Name:  Dimitry Lee   MRN:  [id number]    Recommendations:     Discharge Recommendations: home  Discharge Equipment Recommendations: none   Barriers to discharge: None    Assessment:     Dimitry Lee is a 42 y.o. male admitted with a medical diagnosis of Cellulitis of left elbow. .  At this time, patient is functioning at their prior level of function and does not require further acute PT services. Pt would benefit from an OT eval to address ADL's using his dominant L UE.    Recent Surgery: Procedure(s) (LRB):  INCISION AND DRAINAGE, UPPER EXTREMITY (Left) 1 Day Post-Op    Plan:     During this hospitalization, patient does not require further acute PT services.  Please re-consult if situation changes.      Subjective     Chief Complaint: L elbow pain  Patient/Family Comments/goals: return home  Pain/Comfort:  Pain Rating 1: 4/10  Location - Side 1: Left  Location 1: elbow  Pain Addressed 1: Pre-medicate for activity, Reposition, Nurse notified    Patients cultural, spiritual, Mandaeism conflicts given the current situation: no    Living Environment:  Currently living with his father in a single level home. No steps  Prior to admission, patients level of function was independent.  Equipment used at home: none.  DME owned (not currently used): none.  Upon discharge, patient will have assistance from father.    Objective:     Communicated with nurse Pemberton prior to session.  Patient found supine with peripheral IV upon PT entry to room.    General Precautions: Standard, droplet    Orthopedic Precautions:N/A   Braces: N/A  Respiratory Status: Room air    Exams:  Cognitive Exam:  Patient is oriented to Person, Place, Time, and Situation  Fine Motor Coordination:    -       Intact  Gross Motor Coordination:  WFL  Sensation:    -       Intact  RUE ROM: WNL  RUE Strength: WNL  LUE ROM: WFL except elbow flex/ext  LUE Strength: not tested  RLE ROM:  WNL  RLE Strength: WNL  LLE ROM: WNL  LLE Strength: WNL    Functional Mobility:  Bed Mobility:     Rolling Right: independence  Supine to Sit: independence  Sit to Supine: independence  Transfers:     Sit to Stand:  independence with no AD  Gait: 260' independent with no AD; no gait deviations noted  Balance: Good    AM-PAC 6 CLICK MOBILITY  Total Score:24       Treatment and Education:  Bed mobility  Transfers  Gait  UE positioning    AM-PAC 6 CLICK MOBILITY  Total Score:24     Patient left up in chair with all lines intact, call button in reach, nurse Niecy notified, and father present.    GOALS:   Multidisciplinary Problems       Physical Therapy Goals       Not on file                    History:     Past Medical History:   Diagnosis Date    Bipolar disorder, unspecified     CLARE (generalized anxiety disorder)     Testicular hypofunction        Past Surgical History:   Procedure Laterality Date    FOOT SURGERY Left     INCISION AND DRAINAGE, UPPER EXTREMITY Left 12/28/2022    Procedure: INCISION AND DRAINAGE, UPPER EXTREMITY;  Surgeon: Levon Gardner MD;  Location: [facility];  Service: General;  Laterality: Left;  Left Elbow, have black arm table    WRIST SURGERY         Time Tracking:     PT Received On: 12/29/22  PT Start Time: 0722     PT Stop Time: 0742  PT Total Time (min): 20 min     Billable Minutes: Evaluation 20 mins      12/29/2022

## 2022-12-30 ENCOUNTER — ANESTHESIA EVENT (OUTPATIENT)
Dept: SURGERY | Facility: HOSPITAL | Age: 42
DRG: 581 | End: 2022-12-30
Payer: MEDICAID

## 2022-12-30 ENCOUNTER — ANESTHESIA (OUTPATIENT)
Dept: SURGERY | Facility: HOSPITAL | Age: 42
DRG: 581 | End: 2022-12-30
Payer: MEDICAID

## 2022-12-30 LAB
ALBUMIN SERPL-MCNC: 2.4 G/DL (ref 3.5–5)
ALBUMIN/GLOB SERPL: 0.9 RATIO (ref 1.1–2)
ALP SERPL-CCNC: 60 UNIT/L (ref 40–150)
ALT SERPL-CCNC: 11 UNIT/L (ref 0–55)
AST SERPL-CCNC: 17 UNIT/L (ref 5–34)
BACTERIA SPEC CULT: ABNORMAL
BASOPHILS # BLD AUTO: 0.02 X10(3)/MCL (ref 0–0.2)
BASOPHILS NFR BLD AUTO: 0.4 %
BILIRUBIN DIRECT+TOT PNL SERPL-MCNC: 0.2 MG/DL
BUN SERPL-MCNC: 13.1 MG/DL (ref 8.9–20.6)
CALCIUM SERPL-MCNC: 8.3 MG/DL (ref 8.4–10.2)
CHLORIDE SERPL-SCNC: 107 MMOL/L (ref 98–107)
CO2 SERPL-SCNC: 25 MMOL/L (ref 22–29)
CREAT SERPL-MCNC: 0.76 MG/DL (ref 0.73–1.18)
EOSINOPHIL # BLD AUTO: 0.11 X10(3)/MCL (ref 0–0.9)
EOSINOPHIL NFR BLD AUTO: 2.2 %
ERYTHROCYTE [DISTWIDTH] IN BLOOD BY AUTOMATED COUNT: 11.9 % (ref 11.6–14.4)
GFR SERPLBLD CREATININE-BSD FMLA CKD-EPI: >60 MLS/MIN/1.73/M2
GLOBULIN SER-MCNC: 2.6 GM/DL (ref 2.4–3.5)
GLUCOSE SERPL-MCNC: 106 MG/DL (ref 74–100)
HCT VFR BLD AUTO: 33.5 % (ref 42–52)
HGB BLD-MCNC: 11.4 GM/DL (ref 14–18)
IMM GRANULOCYTES # BLD AUTO: 0.04 X10(3)/MCL (ref 0–0.04)
IMM GRANULOCYTES NFR BLD AUTO: 0.8 %
LYMPHOCYTES # BLD AUTO: 1.4 X10(3)/MCL (ref 0.6–4.6)
LYMPHOCYTES NFR BLD AUTO: 27.9 %
MCH RBC QN AUTO: 30.2 PG
MCHC RBC AUTO-ENTMCNC: 34 MG/DL (ref 33–36)
MCV RBC AUTO: 88.6 FL (ref 80–94)
MONOCYTES # BLD AUTO: 0.48 X10(3)/MCL (ref 0.1–1.3)
MONOCYTES NFR BLD AUTO: 9.6 %
NEUTROPHILS # BLD AUTO: 2.96 X10(3)/MCL (ref 2.1–9.2)
NEUTROPHILS NFR BLD AUTO: 59.1 %
NRBC BLD AUTO-RTO: 0 % (ref 0–1)
PLATELET # BLD AUTO: 175 X10(3)/MCL (ref 140–371)
PMV BLD AUTO: 9.8 FL (ref 9.4–12.4)
POCT GLUCOSE: 84 MG/DL (ref 70–110)
POTASSIUM SERPL-SCNC: 3.4 MMOL/L (ref 3.5–5.1)
PROT SERPL-MCNC: 5 GM/DL (ref 6.4–8.3)
RBC # BLD AUTO: 3.78 X10(6)/MCL (ref 4.7–6.1)
SODIUM SERPL-SCNC: 140 MMOL/L (ref 136–145)
WBC # SPEC AUTO: 5 X10(3)/MCL (ref 4.5–11.5)

## 2022-12-30 PROCEDURE — 37000009 HC ANESTHESIA EA ADD 15 MINS: Performed by: SURGERY

## 2022-12-30 PROCEDURE — 27000207 HC ISOLATION

## 2022-12-30 PROCEDURE — 63600175 PHARM REV CODE 636 W HCPCS

## 2022-12-30 PROCEDURE — 25000003 PHARM REV CODE 250: Performed by: NURSE ANESTHETIST, CERTIFIED REGISTERED

## 2022-12-30 PROCEDURE — 25000003 PHARM REV CODE 250: Performed by: STUDENT IN AN ORGANIZED HEALTH CARE EDUCATION/TRAINING PROGRAM

## 2022-12-30 PROCEDURE — 85025 COMPLETE CBC W/AUTO DIFF WBC: CPT | Performed by: STUDENT IN AN ORGANIZED HEALTH CARE EDUCATION/TRAINING PROGRAM

## 2022-12-30 PROCEDURE — 63600175 PHARM REV CODE 636 W HCPCS: Performed by: STUDENT IN AN ORGANIZED HEALTH CARE EDUCATION/TRAINING PROGRAM

## 2022-12-30 PROCEDURE — 25000003 PHARM REV CODE 250: Performed by: INTERNAL MEDICINE

## 2022-12-30 PROCEDURE — 80053 COMPREHEN METABOLIC PANEL: CPT | Performed by: STUDENT IN AN ORGANIZED HEALTH CARE EDUCATION/TRAINING PROGRAM

## 2022-12-30 PROCEDURE — 36000706: Performed by: SURGERY

## 2022-12-30 PROCEDURE — 11000001 HC ACUTE MED/SURG PRIVATE ROOM

## 2022-12-30 PROCEDURE — 36000707: Performed by: SURGERY

## 2022-12-30 PROCEDURE — 71000033 HC RECOVERY, INTIAL HOUR: Performed by: SURGERY

## 2022-12-30 PROCEDURE — 37000008 HC ANESTHESIA 1ST 15 MINUTES: Performed by: SURGERY

## 2022-12-30 PROCEDURE — 63600175 PHARM REV CODE 636 W HCPCS: Performed by: NURSE ANESTHETIST, CERTIFIED REGISTERED

## 2022-12-30 PROCEDURE — 63600175 PHARM REV CODE 636 W HCPCS: Performed by: ANESTHESIOLOGY

## 2022-12-30 PROCEDURE — 36415 COLL VENOUS BLD VENIPUNCTURE: CPT | Performed by: STUDENT IN AN ORGANIZED HEALTH CARE EDUCATION/TRAINING PROGRAM

## 2022-12-30 RX ORDER — ONDANSETRON 2 MG/ML
INJECTION INTRAMUSCULAR; INTRAVENOUS
Status: DISCONTINUED | OUTPATIENT
Start: 2022-12-30 | End: 2022-12-30

## 2022-12-30 RX ORDER — HYDROMORPHONE HYDROCHLORIDE 1 MG/ML
INJECTION, SOLUTION INTRAMUSCULAR; INTRAVENOUS; SUBCUTANEOUS
Status: COMPLETED
Start: 2022-12-30 | End: 2022-12-30

## 2022-12-30 RX ORDER — SODIUM CHLORIDE, SODIUM LACTATE, POTASSIUM CHLORIDE, CALCIUM CHLORIDE 600; 310; 30; 20 MG/100ML; MG/100ML; MG/100ML; MG/100ML
INJECTION, SOLUTION INTRAVENOUS CONTINUOUS PRN
Status: DISCONTINUED | OUTPATIENT
Start: 2022-12-30 | End: 2022-12-30

## 2022-12-30 RX ORDER — MIDAZOLAM HYDROCHLORIDE 1 MG/ML
2 INJECTION INTRAMUSCULAR; INTRAVENOUS ONCE AS NEEDED
Status: CANCELLED | OUTPATIENT
Start: 2022-12-30 | End: 2034-05-28

## 2022-12-30 RX ORDER — SODIUM CHLORIDE 0.9 % (FLUSH) 0.9 %
10 SYRINGE (ML) INJECTION
Status: DISCONTINUED | OUTPATIENT
Start: 2022-12-30 | End: 2022-12-31 | Stop reason: HOSPADM

## 2022-12-30 RX ORDER — MEPERIDINE HYDROCHLORIDE 25 MG/ML
12.5 INJECTION INTRAMUSCULAR; INTRAVENOUS; SUBCUTANEOUS EVERY 10 MIN PRN
Status: COMPLETED | OUTPATIENT
Start: 2022-12-30 | End: 2022-12-30

## 2022-12-30 RX ORDER — HYDROMORPHONE HYDROCHLORIDE 1 MG/ML
0.2 INJECTION, SOLUTION INTRAMUSCULAR; INTRAVENOUS; SUBCUTANEOUS EVERY 5 MIN PRN
Status: DISCONTINUED | OUTPATIENT
Start: 2022-12-30 | End: 2022-12-30

## 2022-12-30 RX ORDER — OXYCODONE HYDROCHLORIDE 5 MG/1
5 TABLET ORAL
Status: DISCONTINUED | OUTPATIENT
Start: 2022-12-30 | End: 2022-12-30

## 2022-12-30 RX ORDER — MEPERIDINE HYDROCHLORIDE 25 MG/ML
INJECTION INTRAMUSCULAR; INTRAVENOUS; SUBCUTANEOUS
Status: COMPLETED
Start: 2022-12-30 | End: 2022-12-30

## 2022-12-30 RX ORDER — DEXAMETHASONE SODIUM PHOSPHATE 4 MG/ML
INJECTION, SOLUTION INTRA-ARTICULAR; INTRALESIONAL; INTRAMUSCULAR; INTRAVENOUS; SOFT TISSUE
Status: DISCONTINUED | OUTPATIENT
Start: 2022-12-30 | End: 2022-12-30

## 2022-12-30 RX ORDER — GLYCOPYRROLATE 0.2 MG/ML
INJECTION INTRAMUSCULAR; INTRAVENOUS
Status: DISCONTINUED | OUTPATIENT
Start: 2022-12-30 | End: 2022-12-30

## 2022-12-30 RX ORDER — KETOROLAC TROMETHAMINE 30 MG/ML
INJECTION, SOLUTION INTRAMUSCULAR; INTRAVENOUS
Status: DISCONTINUED | OUTPATIENT
Start: 2022-12-30 | End: 2022-12-30

## 2022-12-30 RX ORDER — FENTANYL CITRATE 50 UG/ML
INJECTION, SOLUTION INTRAMUSCULAR; INTRAVENOUS
Status: DISCONTINUED | OUTPATIENT
Start: 2022-12-30 | End: 2022-12-30

## 2022-12-30 RX ORDER — SODIUM CHLORIDE, SODIUM LACTATE, POTASSIUM CHLORIDE, CALCIUM CHLORIDE 600; 310; 30; 20 MG/100ML; MG/100ML; MG/100ML; MG/100ML
INJECTION, SOLUTION INTRAVENOUS CONTINUOUS
Status: DISCONTINUED | OUTPATIENT
Start: 2022-12-30 | End: 2022-12-31

## 2022-12-30 RX ORDER — POTASSIUM CHLORIDE 20 MEQ/1
40 TABLET, EXTENDED RELEASE ORAL ONCE
Status: COMPLETED | OUTPATIENT
Start: 2022-12-30 | End: 2022-12-30

## 2022-12-30 RX ORDER — LIDOCAINE HCL/PF 100 MG/5ML
SYRINGE (ML) INTRAVENOUS
Status: DISCONTINUED | OUTPATIENT
Start: 2022-12-30 | End: 2022-12-30

## 2022-12-30 RX ORDER — ONDANSETRON 2 MG/ML
4 INJECTION INTRAMUSCULAR; INTRAVENOUS DAILY PRN
Status: DISCONTINUED | OUTPATIENT
Start: 2022-12-30 | End: 2022-12-30

## 2022-12-30 RX ORDER — PROPOFOL 10 MG/ML
INJECTION, EMULSION INTRAVENOUS
Status: DISCONTINUED | OUTPATIENT
Start: 2022-12-30 | End: 2022-12-30

## 2022-12-30 RX ORDER — MIDAZOLAM HYDROCHLORIDE 1 MG/ML
INJECTION INTRAMUSCULAR; INTRAVENOUS
Status: COMPLETED
Start: 2022-12-30 | End: 2022-12-30

## 2022-12-30 RX ORDER — PROMETHAZINE HYDROCHLORIDE 25 MG/ML
INJECTION, SOLUTION INTRAMUSCULAR; INTRAVENOUS
Status: COMPLETED
Start: 2022-12-30 | End: 2022-12-30

## 2022-12-30 RX ORDER — PROMETHAZINE HYDROCHLORIDE 25 MG/ML
25 INJECTION, SOLUTION INTRAMUSCULAR; INTRAVENOUS ONCE
Status: COMPLETED | OUTPATIENT
Start: 2022-12-30 | End: 2022-12-30

## 2022-12-30 RX ORDER — SODIUM CHLORIDE, SODIUM LACTATE, POTASSIUM CHLORIDE, CALCIUM CHLORIDE 600; 310; 30; 20 MG/100ML; MG/100ML; MG/100ML; MG/100ML
INJECTION, SOLUTION INTRAVENOUS CONTINUOUS
Status: CANCELLED | OUTPATIENT
Start: 2022-12-30

## 2022-12-30 RX ADMIN — SODIUM CHLORIDE, POTASSIUM CHLORIDE, SODIUM LACTATE AND CALCIUM CHLORIDE: 600; 310; 30; 20 INJECTION, SOLUTION INTRAVENOUS at 12:12

## 2022-12-30 RX ADMIN — VANCOMYCIN HYDROCHLORIDE 1750 MG: 1 INJECTION, POWDER, LYOPHILIZED, FOR SOLUTION INTRAVENOUS at 11:12

## 2022-12-30 RX ADMIN — VANCOMYCIN HYDROCHLORIDE 1750 MG: 1 INJECTION, POWDER, LYOPHILIZED, FOR SOLUTION INTRAVENOUS at 10:12

## 2022-12-30 RX ADMIN — MEPERIDINE HYDROCHLORIDE 12.5 MG: 25 INJECTION INTRAMUSCULAR; INTRAVENOUS; SUBCUTANEOUS at 01:12

## 2022-12-30 RX ADMIN — KETOROLAC TROMETHAMINE 30 MG: 30 INJECTION, SOLUTION INTRAMUSCULAR; INTRAVENOUS at 12:12

## 2022-12-30 RX ADMIN — OXYCODONE HYDROCHLORIDE 10 MG: 5 TABLET ORAL at 11:12

## 2022-12-30 RX ADMIN — MORPHINE SULFATE 6 MG: 2 INJECTION, SOLUTION INTRAMUSCULAR; INTRAVENOUS at 05:12

## 2022-12-30 RX ADMIN — ENOXAPARIN SODIUM 40 MG: 40 INJECTION SUBCUTANEOUS at 05:12

## 2022-12-30 RX ADMIN — MUPIROCIN: 20 OINTMENT TOPICAL at 08:12

## 2022-12-30 RX ADMIN — FENTANYL CITRATE 50 MCG: 50 INJECTION, SOLUTION INTRAMUSCULAR; INTRAVENOUS at 12:12

## 2022-12-30 RX ADMIN — POTASSIUM CHLORIDE 40 MEQ: 1500 TABLET, EXTENDED RELEASE ORAL at 08:12

## 2022-12-30 RX ADMIN — KETAMINE HYDROCHLORIDE 25 MG: 50 INJECTION INTRAMUSCULAR; INTRAVENOUS at 01:12

## 2022-12-30 RX ADMIN — ACETAMINOPHEN 1000 MG: 500 TABLET ORAL at 05:12

## 2022-12-30 RX ADMIN — PROMETHAZINE HYDROCHLORIDE 25 MG: 25 INJECTION INTRAMUSCULAR; INTRAVENOUS at 01:12

## 2022-12-30 RX ADMIN — HYDROMORPHONE HYDROCHLORIDE 0.2 MG: 1 INJECTION, SOLUTION INTRAMUSCULAR; INTRAVENOUS; SUBCUTANEOUS at 01:12

## 2022-12-30 RX ADMIN — DOXYCYCLINE 100 MG: 100 INJECTION, POWDER, LYOPHILIZED, FOR SOLUTION INTRAVENOUS at 08:12

## 2022-12-30 RX ADMIN — MIDAZOLAM 2 MG: 1 INJECTION INTRAMUSCULAR; INTRAVENOUS at 11:12

## 2022-12-30 RX ADMIN — KETOROLAC TROMETHAMINE 15 MG: 30 INJECTION, SOLUTION INTRAMUSCULAR; INTRAVENOUS at 11:12

## 2022-12-30 RX ADMIN — ACETAMINOPHEN 1000 MG: 500 TABLET ORAL at 06:12

## 2022-12-30 RX ADMIN — KETAMINE HYDROCHLORIDE 25 MG: 50 INJECTION INTRAMUSCULAR; INTRAVENOUS at 12:12

## 2022-12-30 RX ADMIN — SODIUM CHLORIDE, POTASSIUM CHLORIDE, SODIUM LACTATE AND CALCIUM CHLORIDE: 600; 310; 30; 20 INJECTION, SOLUTION INTRAVENOUS at 08:12

## 2022-12-30 RX ADMIN — PROMETHAZINE HYDROCHLORIDE 25 MG: 25 INJECTION, SOLUTION INTRAMUSCULAR; INTRAVENOUS at 01:12

## 2022-12-30 RX ADMIN — DEXAMETHASONE SODIUM PHOSPHATE 8 MG: 4 INJECTION, SOLUTION INTRA-ARTICULAR; INTRALESIONAL; INTRAMUSCULAR; INTRAVENOUS; SOFT TISSUE at 12:12

## 2022-12-30 RX ADMIN — KETOROLAC TROMETHAMINE 15 MG: 30 INJECTION, SOLUTION INTRAMUSCULAR; INTRAVENOUS at 05:12

## 2022-12-30 RX ADMIN — ONDANSETRON 4 MG: 2 INJECTION INTRAMUSCULAR; INTRAVENOUS at 12:12

## 2022-12-30 RX ADMIN — OXYCODONE HYDROCHLORIDE 10 MG: 5 TABLET ORAL at 02:12

## 2022-12-30 RX ADMIN — LIDOCAINE HYDROCHLORIDE 50 MG: 20 INJECTION INTRAVENOUS at 12:12

## 2022-12-30 RX ADMIN — MORPHINE SULFATE 6 MG: 2 INJECTION, SOLUTION INTRAMUSCULAR; INTRAVENOUS at 10:12

## 2022-12-30 RX ADMIN — HYDROMORPHONE HYDROCHLORIDE 0.1 MG: 1 INJECTION, SOLUTION INTRAMUSCULAR; INTRAVENOUS; SUBCUTANEOUS at 01:12

## 2022-12-30 RX ADMIN — ACETAMINOPHEN 1000 MG: 500 TABLET ORAL at 11:12

## 2022-12-30 RX ADMIN — PROPOFOL 150 MG: 10 INJECTION, EMULSION INTRAVENOUS at 12:12

## 2022-12-30 RX ADMIN — GLYCOPYRROLATE 0.2 MG: 0.2 INJECTION INTRAMUSCULAR; INTRAVENOUS at 12:12

## 2022-12-30 NOTE — ANESTHESIA PROCEDURE NOTES
Intubation    Date/Time: 12/30/2022 12:36 PM  Performed by: Donovan Humphrey CRNA  Authorized by: Priti Gonzalez MD     Intubation:     Induction:  Intravenous    Intubated:  Postinduction    Mask Ventilation:  Easy with oral airway    Attempts:  1    Attempted By:  CRNA    Difficult Airway Encountered?: No      Airway Device:  Supraglottic airway/LMA    Airway Device Size:  5.0    Style/Cuff Inflation:  Uncuffed    Placement Verified By:  Capnometry    Complicating Factors:  None    Findings Post-Intubation:  BS equal bilateral  Notes:      No leak @ 20 CM H2O

## 2022-12-30 NOTE — OP NOTE
[facility] - [address] [place] [address] Surg Telemetry  Operative Note    SUMMARY     Surgery Date: 12/30/2022     Surgeon(s) and Role:     * Levon Gradner MD - Primary     * Diomedes Sanchez MD - Resident - Assisting     * Gonzalo Tomlin MD - Resident - Assisting        Pre-op Diagnosis:       * Abscess [L02.91]    Post-op Diagnosis:  Post-Op Diagnosis Codes:     * Abscess [L02.91]    Procedure(s) (LRB):  WASHOUT (Left)    Anesthesia: General    Operative Findings: Minimal necrotic, fibrinous tissue    The patient was intubated without complications. Patient was preped and draped in the standard fashion. Timeout was completed. An incision was made a 15 blade a long the skin edges of the prior incision to debride small amounts of necrotic skin. Curettage was performed with the blade on the flow of the wound to assure appropriate blood flow. Hemostasis was achieved with Bovie electrocautery. The wound was irrigated thoroughly with normal saline. Wound was packed with betadine soaked kerlix and was then dressed with 4x4s, ABD pads, kerlix and ACE. Patient tolerated the procedure well without complications. Patient was extubated and taken to the postoperative care area.     Dr. Gardner was present for the entirety of the procedure    Estimated Blood Loss: 5 mL             Specimens:   Specimen (24h ago, onward)      None            [id number]    Gonzalo Tomlin MD  12/30/2022 2:23 PM

## 2022-12-30 NOTE — PROGRESS NOTES
Inpatient Nutrition Assessment    Admit Date: 12/27/2022   Total duration of encounter: 3 days     Nutrition Recommendation/Prescription     Continue Regular diet as tolerated  Supplement meals with Boost Original BID, to provide 240kcals and 10g pro per serving  Riccardo BID to promote wound healing  Monitor weights weekly       Communication of Recommendations: reviewed with patient/caregiver    Nutrition Assessment     Malnutrition Assessment/Nutrition-Focused Physical Exam    Malnutrition in the context of acute illness or injury  Degree of Malnutrition: severe malnutrition  Energy Intake: </= 50% of estimated energy requirement for >/= 5 days  Interpretation of Weight Loss: >2% in 1 week  Body Fat:does not meet criteria  Area of Body Fat Loss: does not meet criteria  Muscle Mass Loss: does not meet criteria  Area of Muscle Mass Loss: does not meet criteria  Fluid Accumulation: does not meet criteria  Edema: no edema present and does not meet criteria   Reduced  Strength: unable to obtain  A minimum of two characteristics is recommended for diagnosis of either severe or non-severe malnutrition.    Chart Review    Reason Seen: continuous nutrition monitoring    Malnutrition Screening Tool Results   Have you recently lost weight without trying?: No  Have you been eating poorly because of a decreased appetite?: No   MST Score: 0     Diagnosis: L elbow abcess/cellulitis s/p I&D 12/28    Relevant Medical History: bipolar & general anxiety disorder    Nutrition-Related Medications: vanco, LR 125mL/hr  Calorie Containing IV Medications: no significant kcals from medications at this time    Nutrition-Related Labs:   12/28-Pro Tot 5.4, Alb 2.3, GFR >60  12/30/22 -- K 3.4 L, BUN 13.1 Cr 0.76      Diet/PN Order: Diet NPO  Oral Supplement Order: Boost and Riccardo  Tube Feeding Order: none  Appetite/Oral Intake: good/NPO  Factors Affecting Nutritional Intake: none identified  Food/Anabaptism/Cultural Preferences: none  "reported  Food Allergies: no known food allergies    Skin Integrity: other (see comments) (left elbow wound w/ purulent drainage)  Wound(s):   Left elbow abscess     Comments  : Visited pt, fly present in room. Pt s/p I&D to left elbow abscess; pt ok to add riccardo BID. Pt reports good po intake of lunch. No GI complaints reported. Pt reports decreased appetite x 6 days pta d/t flu; reports ~10# wt loss as well; equates to ~4% wt loss x 1wk, significant. Pt ok to add Boost BID.    22 -- pt reports good appetite, drinking boost & using Riccardo to promote wound healing, pt NPO this am d/t plans for washout to left elbow today noted    Anthropometrics    Height: 5' 10" (177.8 cm) Height Method: Stated  Last Weight: 96.6 kg (212 lb 15.4 oz) (22 0616) Weight Method: Standard Scale  BMI (Calculated): 30.6  BMI Classification: obese grade I (BMI 30-34.9)        Ideal Body Weight (IBW), Male: 166 lb     % Ideal Body Weight, Male (lb): 128.3 %                 Usual Body Weight (UBW), k kg (UBW ~220# per pt)  % Usual Body Weight: 96.8     Usual Weight Provided By: patient    Wt Readings from Last 5 Encounters:   22 96.6 kg (212 lb 15.4 oz)   12/10/21 93.9 kg (207 lb 0.2 oz)     Weight Change(s) Since Admission:  Admit Weight: 96.6 kg (212 lb 15.4 oz) (22 0616)  : UBW ~220#; ~4% wt loss x 1wk, significant  22 -- no new wts    Estimated Needs    Weight Used For Calorie Calculations: 96.6 kg (212 lb 15.4 oz)  Energy Calorie Requirements (kcal): 8382-9451 kcals (20-22 kcal/kg)  Energy Need Method: Kcal/kg  Weight Used For Protein Calculations: 96.6 kg (212 lb 15.4 oz)  Protein Requirements: 97g (1.0g/kg)  Fluid Requirements (mL): 1932-2125mL (1mL/kcal)  Temp: 98.1 °F (36.7 °C)       Enteral Nutrition    Patient not receiving enteral nutrition at this time.    Parenteral Nutrition    Patient not receiving parenteral nutrition support at this time.    Evaluation of Received Nutrient " Intake    Calories: meeting estimated needs  Protein: meeting estimated needs    Patient Education    Not applicable.    Nutrition Diagnosis     PES: Malnutrition related to inadequate energy intake as evidenced by <50% intake >5 days; >2% wt loss x 1wk. (continues)    Interventions/Goals     Intervention(s): general/healthful diet, commercial beverage, multivitamin/mineral supplement therapy, and collaboration with other providers  Goal: Meet greater than 75% of nutritional needs by follow-up. (goal met)    Monitoring & Evaluation     Dietitian will monitor food and beverage intake, weight, electrolyte/renal panel, and glucose/endocrine profile.  Nutrition Risk/Follow-Up: moderate (follow-up in 3-5 days)   Please consult if re-assessment needed sooner.

## 2022-12-30 NOTE — ANESTHESIA POSTPROCEDURE EVALUATION
Anesthesia Post Evaluation    Patient: Dimitry Lee    Procedure(s) Performed: Procedure(s) (LRB):  WASHOUT (Left)    Final Anesthesia Type: general      Patient location during evaluation: DOSC  Post-procedure vital signs: reviewed and stable  Airway patency: patent      Anesthetic complications: no      Cardiovascular status: hemodynamically stable  Respiratory status: spontaneous ventilation  Follow-up not needed.          Vitals Value Taken Time   /79 12/30/22 1424   Temp 36.6 °C (97.8 °F) 12/30/22 1424   Pulse 53 12/30/22 1424   Resp 18 12/30/22 1424   SpO2 100 % 12/30/22 1424         Event Time   Out of Recovery 12/30/2022 14:05:00         Pain/Adrienne Score: Pain Rating Prior to Med Admin: 8 (12/30/2022  1:46 PM)  Pain Rating Post Med Admin: 0 (12/30/2022 10:36 AM)  Adrienne Score: 9 (12/30/2022  1:24 PM)

## 2022-12-30 NOTE — ANESTHESIA PREPROCEDURE EVALUATION
12/30/2022  Dimitry Lee is a 42 y.o., male with PMHx of obesity, smoking, PSA, bipolar/anxiety presents for Lt elbow cellulitis/abscess washout.    NO BETA BLOCKER USE    Active Ambulatory Problems     Diagnosis Date Noted    No Active Ambulatory Problems     Resolved Ambulatory Problems     Diagnosis Date Noted    No Resolved Ambulatory Problems     Past Medical History:   Diagnosis Date    Bipolar disorder, unspecified     CLARE (generalized anxiety disorder)     Testicular hypofunction      Antibiotics:  Doxycycline 100 mg IV q 12 (last dose 12/30/22 @ 0824)  Vanc 1750 mg IV q 12 (last dose 12/29/22 @ 2255)    DVT Prophylaxis:  Lovenox 40 mg subq qd (last dose 12/29/22 @ 1706)    Pre-op Assessment    I have reviewed the NPO Status.      Review of Systems  Anesthesia Hx:  No problems with previous Anesthesia    Social:  Smoker    Cardiovascular:  Cardiovascular Normal     Pulmonary:  Pulmonary Normal    Renal/:  Renal/ Normal     Hepatic/GI:  Hepatic/GI Normal    Neurological:  Neurology Normal    Endocrine:  Obesity / BMI > 30  Psych:   Psychiatric History anxiety        Vitals:    12/30/22 0412 12/30/22 0735 12/30/22 1006 12/30/22 1057   BP: (!) 141/90 128/84  129/86   Pulse: (!) 48 (!) 51  (!) 50   Resp:   20 18   Temp:  36.6 °C (97.8 °F)  36.7 °C (98.1 °F)   TempSrc:  Oral     SpO2: 99% 98%  98%   Weight:       Height:             Physical Exam  General: Alert, Cooperative and Well nourished    Airway:  Mallampati: II   Mouth Opening: Normal  TM Distance: Normal  Tongue: Normal  Neck ROM: Normal ROM    Dental:  Intact    Chest/Lungs:  Clear to auscultation, Normal Respiratory Rate    Heart:  Rate: Normal  Rhythm: Regular Rhythm  Sounds: Normal      Lab Results   Component Value Date    WBC 5.0 12/30/2022    HGB 11.4 (L) 12/30/2022    HCT 33.5 (L) 12/30/2022    MCV 88.6 12/30/2022    PLT  175 12/30/2022       CMP  Sodium Level   Date Value Ref Range Status   12/30/2022 140 136 - 145 mmol/L Final     Potassium Level   Date Value Ref Range Status   12/30/2022 3.4 (L) 3.5 - 5.1 mmol/L Final     Carbon Dioxide   Date Value Ref Range Status   12/30/2022 25 22 - 29 mmol/L Final     Blood Urea Nitrogen   Date Value Ref Range Status   12/30/2022 13.1 8.9 - 20.6 mg/dL Final     Creatinine   Date Value Ref Range Status   12/30/2022 0.76 0.73 - 1.18 mg/dL Final     Calcium Level Total   Date Value Ref Range Status   12/30/2022 8.3 (L) 8.4 - 10.2 mg/dL Final     Albumin Level   Date Value Ref Range Status   12/30/2022 2.4 (L) 3.5 - 5.0 g/dL Final     Bilirubin Total   Date Value Ref Range Status   12/30/2022 0.2 <=1.5 mg/dL Final     Alkaline Phosphatase   Date Value Ref Range Status   12/30/2022 60 40 - 150 unit/L Final     Aspartate Aminotransferase   Date Value Ref Range Status   12/30/2022 17 5 - 34 unit/L Final     Alanine Aminotransferase   Date Value Ref Range Status   12/30/2022 11 0 - 55 unit/L Final     eGFR   Date Value Ref Range Status   12/30/2022 >60 mls/min/1.73/m2 Final               Anesthesia Plan  Type of Anesthesia, risks & benefits discussed:    Anesthesia Type: Gen ETT  Intra-op Monitoring Plan: Standard ASA Monitors  Post Op Pain Control Plan: IV/PO Opioids PRN  Induction:  IV  Airway Plan: Direct  Informed Consent: Informed consent signed with the Patient and all parties understand the risks and agree with anesthesia plan.  All questions answered.   ASA Score: 2  Day of Surgery Review of History & Physical: H&P Update referred to the surgeon/provider.    Ready For Surgery From Anesthesia Perspective.     .

## 2022-12-30 NOTE — PROGRESS NOTES
[facility]/General Surgery  History & Physical    Interval   S/P I&D day #2  JADA  AF  Pulse at 48 this AM, non symptomatic, otherwise HDS  Pain improvement over L elbow, dressings in place  NPO  Levofloxacin &Vancomycin        OBJECTIVE:     Vital Signs:  Temp: 98.2 °F (36.8 °C) (12/30/22 0008)  Pulse: (!) 48 (12/30/22 0412)  Resp: 18 (12/30/22 0212)  BP: (!) 141/90 (12/30/22 0412)  SpO2: 99 % (12/30/22 0412)    Physical Exam:  General: well developed, well nourished, no distress  HEENT: normocephalic, atraumatic, hearing grossly normal bilaterally  Cardiovascular: regular rate    Extremities:  Left elbow dressings in place, c/d/i  Abdomen: soft, non-tender to palpation, no distention, no masses/hernias  Psych/Behavioral:  Alert and oriented, appropriate affect.      Laboratory:  Labs Reviewed   C-REACTIVE PROTEIN - Abnormal; Notable for the following components:       Result Value    C-Reactive Protein 284.20 (*)     All other components within normal limits   COMPREHENSIVE METABOLIC PANEL - Abnormal; Notable for the following components:    Albumin Level 3.0 (*)     Globulin 3.9 (*)     Albumin/Globulin Ratio 0.8 (*)     All other components within normal limits   SEDIMENTATION RATE, AUTOMATED - Abnormal; Notable for the following components:    Sed Rate 27 (*)     All other components within normal limits   URINALYSIS, REFLEX TO URINE CULTURE - Abnormal; Notable for the following components:    Color, UA Orange (*)     Appearance, UA Hazy (*)     Protein, UA 1+ (*)     Ketones, UA Trace (*)     Bilirubin, UA 1+ (*)     Urobilinogen, UA +4 (*)     Leukocyte Esterase, UA 25 (*)     WBC, UA 51-99 (*)     WBC Clumps, UA Few (*)     Squamous Epithelial Cells, UA Trace (*)     Mucous, UA Many (*)     Hyaline Casts, UA >20 (*)     All other components within normal limits   DRUG SCREEN, URINE (BEAKER) - Abnormal; Notable for the following components:    Amphetamines, Urine Positive (*)     Cannabinoids, Urine  Positive (*)     Opiates, Urine Positive (*)     All other components within normal limits    Narrative:     Cut off concentrations:    Amphetamines - 1000 ng/ml  Barbiturates - 200 ng/ml  Benzodiazepine - 200 ng/ml  Cannabinoids (THC) - 50 ng/ml  Cocaine - 300 ng/ml  Fentanyl - 1.0 ng/ml  MDMA - 500 ng/ml  Opiates - 300 ng/ml   Phencyclidine (PCP) - 25 ng/ml    Specimen submitted for drug analysis and tested for pH and specific gravity in order to evaluate sample integrity. Suspect tampering if specific gravity is <1.003 and/or pH is not within the range of 4.5 - 8.0  False negatives may result form substances such as bleach added to urine.  False positives may result for the presence of a substance with similar chemical structure to the drug or its metabolite.    This test provides only a PRELIMINARY analytical test result. A more specific alternate chemical method must be used in order to obtain a confirmed analytical result. Gas chromatography/mass spectrometry (GC/MS) is the preferred confirmatory method. Other chemical confirmation methods are available. Clinical consideration and professional judgement should be applied to any drug of abuse test result, particularly when preliminary positive results are used.    Positive results will be confirmed only at the physicians request. Unconfirmed screening results are to be used only for medical purposes (treatment).        CBC WITH DIFFERENTIAL - Abnormal; Notable for the following components:    RDW 11.5 (*)     Platelet 134 (*)     Neut # 9.90 (*)     All other components within normal limits   LACTIC ACID, PLASMA - Normal   CBC W/ AUTO DIFFERENTIAL    Narrative:     The following orders were created for panel order CBC Auto Differential.  Procedure                               Abnormality         Status                     ---------                               -----------         ------                     CBC with Differential[[id number]]        Abnormal             Final result                 Please view results for these tests on the individual orders.   EXTRA TUBES    Narrative:     The following orders were created for panel order EXTRA TUBES.  Procedure                               Abnormality         Status                     ---------                               -----------         ------                     Light Blue Top Hold[[id number]]                              In process                 Red Top Hold[[id number]]                                     In process                 Gold Top Hold[[id number]]                                    In process                 Pink Top Hold[[id number]]                                    In process                   Please view results for these tests on the individual orders.   LIGHT BLUE TOP HOLD   RED TOP HOLD   GOLD TOP HOLD   PINK TOP HOLD       Diagnostic Results:  Imaging Results              MRI Elbow Joint W WO Contrast Left (Final result)  Result time 12/27/22 15:04:05      Final result by Morteza Daniel MD (12/27/22 15:04:05)                   Impression:      Fluid collection posterior subcutaneous adipose tissue in the region of the olecranon bursa which is somewhat infiltrative predominantly with some areas demonstrating early organization measuring 8.2 x 5.8 x 1.7 cm.  This is likely predominantly and non drainable fluid collection.    Extensive severe cellulitis throughout the visualized elbow.    Moderate intramuscular edema involving the flexor digitorum profundus muscle and to a lesser degree the flexor carpi ulnaris muscle suggesting myositis.    Degenerative change radiocapitellar joint.      Electronically signed by: Siomara Daniel MD  Date:    12/27/2022  Time:    15:04               Narrative:    EXAMINATION:  MRI ELBOW W WO CONTRAST LEFT    CLINICAL HISTORY:  Septic arthritis suspected, elbow, xray done;  Pain in left elbow    TECHNIQUE:  MRI left elbow performed before and after intravenous  contrast using routine protocol.    COMPARISON:  Radiograph same day    FINDINGS:  Moderate intensity edema noted within the flexor carpi ulnaris muscle and flexor digitorum profundus proximally within the field of view.  No gross muscular tissue disruption or tendon disruption.  No intramuscular fluid.  There is a region of somewhat infiltrative fluid with some areas faisal sing in the posterior subcutaneous adipose tissue near the region of the olecranon bursa measuring 8.2 x 5.8 x 1.8 cm.  Severe generalized cellulitis noted.  Bones demonstrate normal signal without fracture or destructive lesion.  There is moderate degenerative change of the radiocapitellar joint including joint space narrowing, periarticular cartilage thinning, and patchy areas of periarticular subchondral fibrocystic change.  No significant joint effusion.  Bones otherwise demonstrate normal signal.  Biceps and brachialis tendons are intact and demonstrate normal signal.  Common extensor tendon normal.  Ulnar collateral ligament and radial collateral ligaments grossly intact.  Triceps tendon normal.                                       X-Ray Elbow Complete Left (Final result)  Result time 12/27/22 07:27:06      Final result by Ashleigh Oneil MD (12/27/22 07:27:06)                   Impression:      No acute bony abnormality.      Electronically signed by: Ashleigh Oneil  Date:    12/27/2022  Time:    07:27               Narrative:    EXAMINATION:  XR ELBOW COMPLETE 3 VIEW LEFT    CLINICAL HISTORY:  Cellulitis of left upper limb    COMPARISON:  None.    FINDINGS:  There is no acute fracture or malalignment.  There is no olecranon enthesophyte.  There is no evidence of an elbow joint effusion.  Soft tissue swelling is noted.                                      ASSESSMENT:     42-year-old male with left arm cellulitis, no organized fluid collection on MRI.  Surgery consulted for drainage.    PLAN:     - Changed dressings today  - Wound cx  positive for S. Aureus, sensitive to Vancomycin  - Washout of L elbow today  - NPO  - Continue abx  - MMPC  - Recommend electrolyte replacement  - Rest of care per primary    Gonzalo Tipton MD  [facility] General Surgery PGY1    Pre Op Staff Note    Pt is prepared for washout in OR, likely   re-packing today. Will inspect surface skin of abscess cavity.    Levon Gardner MD

## 2022-12-30 NOTE — PLAN OF CARE
12/29/22 1330   Discharge Assessment   Assessment Type Discharge Planning Assessment   Confirmed/corrected address, phone number and insurance Yes   Confirmed Demographics Correct on Facesheet   Source of Information patient   When was your last doctors appointment?   (Tramaine Jimbo)   Reason For Admission Left elbow pain, CP, Cellulitis Lt elbow   People in Home parent(s)   Facility Arrived From: Home   Do you expect to return to your current living situation? Yes   Do you have help at home or someone to help you manage your care at home? No   Prior to hospitilization cognitive status: Alert/Oriented   Current cognitive status: Alert/Oriented   Equipment Currently Used at Home none   Patient currently being followed by outpatient case management? No   Do you currently have service(s) that help you manage your care at home? No   Do you take prescription medications? Yes   Do you have prescription coverage? Yes   Coverage MEDICAID - [employer] [facility]   Do you have any problems affording any of your prescribed medications? No   Is the patient taking medications as prescribed? yes   Who is going to help you get home at discharge? Family   How do you get to doctors appointments? car, drives self   Are you on dialysis? No   Do you take coumadin? No   Discharge Plan A Home   DME Needed Upon Discharge  none   Discharge Plan discussed with: Patient   Discharge Barriers Identified None   Physical Activity   On average, how many days per week do you engage in moderate to strenuous exercise (like a brisk walk)? 1 day   On average, how many minutes do you engage in exercise at this level? 30 min   Financial Resource Strain   How hard is it for you to pay for the very basics like food, housing, medical care, and heating? Not hard   Housing Stability   In the last 12 months, was there a time when you were not able to pay the mortgage or rent on time? N   In the last 12 months, how many places have you lived? 1   In  the last 12 months, was there a time when you did not have a steady place to sleep or slept in a shelter (including now)? N   Transportation Needs   In the past 12 months, has lack of transportation kept you from medical appointments or from getting medications? no   Food Insecurity   Within the past 12 months, you worried that your food would run out before you got the money to buy more. Never true   Within the past 12 months, the food you bought just didn't last and you didn't have money to get more. Never true   Stress   Do you feel stress - tense, restless, nervous, or anxious, or unable to sleep at night because your mind is troubled all the time - these days? Not at all   Social Connections   In a typical week, how many times do you talk on the phone with family, friends, or neighbors? More than 3   How often do you get together with friends or relatives? More than 3   How often do you attend Mormonism or Amish services? Never   Do you belong to any clubs or organizations such as Mormonism groups, unions, fraternal or athletic groups, or school groups? No   How often do you attend meetings of the clubs or organizations you belong to? Never   Are you , , , , never , or living with a partner?    Alcohol Use   Q1: How often do you have a drink containing alcohol? Never   Q2: How many drinks containing alcohol do you have on a typical day when you are drinking? None   Q3: How often do you have six or more drinks on one occasion? Never   OTHER   Name(s) of People in Home AgapitoEmilia galvan (Mother)   [phone] (Mobile)     Pt is not interested in home health. He would like to follow up with our wound care clinic.

## 2022-12-30 NOTE — TRANSFER OF CARE
"Anesthesia Transfer of Care Note    Patient: Dimitry Altmanhuiasrh    Procedure(s) Performed: Procedure(s) (LRB):  WASHOUT (Left)    Patient location: PACU    Anesthesia Type: general    Transport from OR: Transported from OR on room air with adequate spontaneous ventilation    Post pain: adequate analgesia    Post assessment: no apparent anesthetic complications and tolerated procedure well    Post vital signs: stable    Level of consciousness: awake and lethargic    Nausea/Vomiting: no nausea/vomiting    Complications: none    Transfer of care protocol was followedComments: Report to CHAPARRO Nelson      Last vitals:   Visit Vitals  /86   Pulse (!) 50   Temp 36.7 °C (98.1 °F)   Resp 18   Ht 5' 10" (1.778 m)   Wt 96.6 kg (212 lb 15.4 oz)   SpO2 98%   BMI 30.56 kg/m²     "

## 2022-12-30 NOTE — PROGRESS NOTES
[facility] Medicine Wards   Progress Note     Resident Team: [facility] Medicine List 3  Attending Physician: Denny Choi MD  Resident:    Intern: DO Kunal  Hospital Length of Stay: 1 days    Subjective:      Brief HPI:  Dimitry Lee is a 42 y.o. male with a history of bipolar & general anxiety disorder who presented to the ED on 12/27/2022  with a primary complaint of L elbow abscess and cellulitis. Pt reports waking up Saturday 12/24/22 with a blister on his elbow which popped resulting in an abscess. Has several tattoos on his L arm with the most recent one being 2 weeks ago resulting in small blisters for which he was on bactrim for 3 weeks at time for about 3 months. Also reports having the flu last week and had lost about 10 pnds since then. Had one episode of diarrhea yesterday associated with nausea and fever of 103. He is allergic to keflex ( rash reactions). Denies IV drug use but admits to [facility]. UDS positive for amphetamines, Cannabinoids, phencyclidine.  He reports decreased ROM of the L arm with pain rated 9/10. Denies SOB, chest pain, abdominal pain, vomiting.     Upon arrival to the ED, VS were stable, labs significant for Plt 134, .2, sed rate 27, UDS + for amphetamines, Cannabinoids, phencyclidine.    Significant Events/Hospital Course:   I&D 12/28/22    24 hour Interval History:   Seen this AM, NAD and no acute events overnight. Afebrile. Wound covered with dressing. NPO after midnight for wash out today.       Review of Systems:  Review of Systems   Constitutional:  Negative for chills and fever.   HENT:  Negative for congestion and sore throat.    Eyes:  Negative for blurred vision and double vision.   Respiratory:  Negative for cough, sputum production and shortness of breath.    Cardiovascular:  Negative for chest pain, palpitations and leg swelling.   Gastrointestinal:  Negative for abdominal pain and vomiting.   Genitourinary:  Negative for dysuria.   Musculoskeletal:  Positive  for joint pain (left elbow pain). Negative for back pain and myalgias.   Neurological:  Negative for dizziness, focal weakness, loss of consciousness and headaches.        Objective:     Vital Signs (Most Recent):  Temp: 97.8 °F (36.6 °C) (12/30/22 0735)  Pulse: (!) 51 (12/30/22 0735)  Resp: 18 (12/30/22 0212)  BP: 128/84 (12/30/22 0735)  SpO2: 98 % (12/30/22 0735)   Vital Signs (24h Range):  Temp:  [97.7 °F (36.5 °C)-98.2 °F (36.8 °C)] 97.8 °F (36.6 °C)  Pulse:  [48-62] 51  Resp:  [18] 18  SpO2:  [96 %-99 %] 98 %  BP: (111-141)/(75-90) 128/84       Physical Examination:  Physical Exam  Vitals and nursing note reviewed.   Constitutional:       General: He is not in acute distress.     Appearance: Normal appearance.   HENT:      Head: Normocephalic and atraumatic.   Eyes:      Pupils: Pupils are equal, round, and reactive to light.   Cardiovascular:      Rate and Rhythm: Normal rate and regular rhythm.      Pulses: Normal pulses.      Heart sounds: Normal heart sounds. No murmur heard.    No friction rub. No gallop.   Pulmonary:      Effort: Pulmonary effort is normal. No respiratory distress.      Breath sounds: Normal breath sounds. No wheezing or rales.   Abdominal:      General: Abdomen is flat. There is no distension.      Palpations: Abdomen is soft.      Tenderness: There is no abdominal tenderness.   Musculoskeletal:      Cervical back: Neck supple.      Right lower leg: No edema.      Left lower leg: No edema.      Comments: LUE: decreased swelling, with decreased tenderness present. Multiple tattoos noted.      Skin:     General: Skin is warm.      Capillary Refill: Capillary refill takes less than 2 seconds.      Findings: Erythema present.      Comments: Left elbow with erythema still appreciated. Wrapped in dressing. No drainage appreciated.    Neurological:      General: No focal deficit present.      Mental Status: He is alert and oriented to person, place, and time.       Laboratory:  Most Recent  Data:  Recent Lab Results         12/30/22  0356   12/29/22  0952        Albumin/Globulin Ratio 0.9         Albumin 2.4         Alkaline Phosphatase 60         ALT 11         AST 17         Baso # 0.02         Basophil % 0.4         BILIRUBIN TOTAL 0.2         BUN 13.1         Calcium 8.3         Chloride 107         CO2 25         Creatinine 0.76         eGFR >60         Eos # 0.11         Eosinophil % 2.2         Globulin, Total 2.6         Glucose 106         Hematocrit 33.5         Hemoglobin 11.4         Immature Grans (Abs) 0.04         Immature Granulocytes 0.8         Lymph # 1.40         LYMPH % 27.9         MCH 30.2         MCHC 34.0         MCV 88.6         Mono # 0.48         Mono % 9.6         MPV 9.8         Neut # 2.96         Neut % 59.1         nRBC 0.0         Platelets 175         Potassium 3.4         PROTEIN TOTAL 5.0         RBC 3.78         RDW 11.9         Sodium 140         Vancomycin-Trough   11.0       WBC 5.0                   Microbiology Data Reviewed: yes  Pertinent Findings:  Microbiology Results (last 7 days)       Procedure Component Value Units Date/Time    Anaerobic Culture [[id number]] Collected: 12/28/22 1055    Order Status: Completed Specimen: Abscess from Elbow, Left Updated: 12/30/22 0756     Anaerobe Culture No Anaerobes Isolated    Blood Culture [[id number]]  (Normal) Collected: 12/27/22 0912    Order Status: Completed Specimen: Blood from Arm, Right Updated: 12/29/22 2001     CULTURE, BLOOD ([facility]) No Growth At 48 Hours    Blood Culture [[id number]]  (Normal) Collected: 12/27/22 0912    Order Status: Completed Specimen: Blood from Hand, Right Updated: 12/29/22 1601     CULTURE, BLOOD ([facility]) No Growth At 48 Hours    Wound Culture [[id number]]  (Abnormal) Collected: 12/28/22 1055    Order Status: Completed Specimen: Abscess from Elbow, Left Updated: 12/29/22 1225     Wound Culture Moderate Staphylococcus aureus    Gram Stain [[id number]] Collected: 12/28/22 1055    Order Status:  Completed Specimen: Abscess from Elbow, Left Updated: 12/29/22 1109     GRAM STAIN Rare WBC observed      Rare Gram positive cocci    Wound Culture [[id number]]  (Abnormal)  (Susceptibility) Collected: 12/27/22 0735    Order Status: Completed Specimen: Abscess from Elbow, Left Updated: 12/29/22 1019     Wound Culture Moderate Methicillin resistant Staphylococcus aureus    Urine culture [[id number]] Collected: 12/27/22 0755    Order Status: Completed Specimen: Urine Updated: 12/29/22 0823     Urine Culture No Growth    Wound Culture [[id number]]     Order Status: Sent Specimen: Abscess from Elbow     Chlamydia/GC, PCR [[id number]]  (Normal) Collected: 12/28/22 0600    Order Status: Completed Specimen: Urine Updated: 12/28/22 0915     Chlamydia trachomatis PCR Not Detected     N. gonorrhea PCR Not Detected    Narrative:      The Xpert CT/NG test, performed on the GeneXpert system is a qualitative in vitro real-time polymerase chain reaction (PCR) test for the automated detected and differentiation for genomic DNA from Chlamydia trachomatis (CT) and/or Neisseria gonorrhoeae (NG).              Other Results:  EKG (my interpretation):   No results found for this or any previous visit.      Radiology:  Imaging Results              MRI Elbow Joint W WO Contrast Left (Final result)  Result time 12/27/22 15:04:05      Final result by Morteza Daniel MD (12/27/22 15:04:05)                   Impression:      Fluid collection posterior subcutaneous adipose tissue in the region of the olecranon bursa which is somewhat infiltrative predominantly with some areas demonstrating early organization measuring 8.2 x 5.8 x 1.7 cm.  This is likely predominantly and non drainable fluid collection.    Extensive severe cellulitis throughout the visualized elbow.    Moderate intramuscular edema involving the flexor digitorum profundus muscle and to a lesser degree the flexor carpi ulnaris muscle suggesting myositis.    Degenerative change  radiocapitellar joint.      Electronically signed by: Siomara Daniel MD  Date:    12/27/2022  Time:    15:04               Narrative:    EXAMINATION:  MRI ELBOW W WO CONTRAST LEFT    CLINICAL HISTORY:  Septic arthritis suspected, elbow, xray done;  Pain in left elbow    TECHNIQUE:  MRI left elbow performed before and after intravenous contrast using routine protocol.    COMPARISON:  Radiograph same day    FINDINGS:  Moderate intensity edema noted within the flexor carpi ulnaris muscle and flexor digitorum profundus proximally within the field of view.  No gross muscular tissue disruption or tendon disruption.  No intramuscular fluid.  There is a region of somewhat infiltrative fluid with some areas faisal sing in the posterior subcutaneous adipose tissue near the region of the olecranon bursa measuring 8.2 x 5.8 x 1.8 cm.  Severe generalized cellulitis noted.  Bones demonstrate normal signal without fracture or destructive lesion.  There is moderate degenerative change of the radiocapitellar joint including joint space narrowing, periarticular cartilage thinning, and patchy areas of periarticular subchondral fibrocystic change.  No significant joint effusion.  Bones otherwise demonstrate normal signal.  Biceps and brachialis tendons are intact and demonstrate normal signal.  Common extensor tendon normal.  Ulnar collateral ligament and radial collateral ligaments grossly intact.  Triceps tendon normal.                                       X-Ray Elbow Complete Left (Final result)  Result time 12/27/22 07:27:06      Final result by Ashleigh Oneil MD (12/27/22 07:27:06)                   Impression:      No acute bony abnormality.      Electronically signed by: Ashleigh Oneil  Date:    12/27/2022  Time:    07:27               Narrative:    EXAMINATION:  XR ELBOW COMPLETE 3 VIEW LEFT    CLINICAL HISTORY:  Cellulitis of left upper limb    COMPARISON:  None.    FINDINGS:  There is no acute fracture or  malalignment.  There is no olecranon enthesophyte.  There is no evidence of an elbow joint effusion.  Soft tissue swelling is noted.                                      Current Medications:     Infusions:   lactated ringers 125 mL/hr at 12/30/22 0824          Scheduled:   acetaminophen  1,000 mg Oral Q6H    doxycycline (VIBRAMYCIN) IVPB  100 mg Intravenous Q12H    enoxaparin  40 mg Subcutaneous Daily    ketorolac  15 mg Intravenous Q6H    mupirocin   Nasal BID    nicotine  1 patch Transdermal Daily    vancomycin (VANCOCIN) IVPB  1,750 mg Intravenous Q12H        PRN:  acetaminophen, dextrose 10%, dextrose 10%, glucagon (human recombinant), glucose, glucose, morphine, naloxone, ondansetron, oxyCODONE, oxyCODONE, sodium chloride 0.9%    Antibiotics and Day Number of Therapy:  Antibiotics (From admission, onward)      Start     Stop Route Frequency Ordered    12/30/22 0815  doxycycline (VIBRAMYCIN) 100 mg in sodium chloride 0.9% 250 mL IVPB         -- IV Every 12 hours (non-standard times) 12/30/22 0707    12/29/22 2100  mupirocin 2 % ointment         01/03 2059 Nasl 2 times daily 12/29/22 1237    12/27/22 2215  vancomycin (VANCOCIN) 1,750 mg in sodium chloride 0.9% 500 mL IVPB         -- IV Every 12 hours (non-standard times) 12/27/22 0903                Intake/Output Summary (Last 24 hours) at 12/30/2022 0832  Last data filed at 12/30/2022 0400  Gross per 24 hour   Intake 240 ml   Output 1000 ml   Net -760 ml         Lines/Drains/Airways       Peripheral Intravenous Line  Duration                  Peripheral IV - Single Lumen 12/29/22 1813 20 G Posterior;Proximal;Right Forearm <1 day                      Assessment & Plan:     L elbow abcess/cellulitis       -Sed rate 27; .2       - Afebrile on admission, low suspicion for septic joint       - XR negative for fracture or bony abnormality       -On vanc day 4, levo for 3 days, now on doxy d1       -MM pain control with norco pRN       -Getting surgery to  evaluate, I&D today  -Follow MRI: Fluid collection posterior subcutaneous adipose tissue in the region of the olecranon bursa which is somewhat infiltrative predominantly with some areas demonstrating early organization measuring 8.2 x 5.8 x 1.7 cm.  This is likely predominantly and non drainable fluid collection. Extensive severe cellulitis throughout the visualized elbow. Moderate intramuscular edema involving the flexor digitorum profundus muscle and to a lesser degree the flexor carpi ulnaris muscle suggesting myositis. Degenerative change radiocapitellar joint.       -LR @125ml/hr       -blood no growth at 48 hours, wound cx moderate MRSA, urine cx no growth       -Intra-op wound cx moderate MRSA       -MRSA PCR positive        -Immunoglobulin panel: IgG 414, IgM 25, IgA 45.0. Consider follow up with Allergy and Immunology outpatient      Bipolar disorder/anxiety       - On many meds, will resume     Thrombocytopenia, resolved       - Plt 134 on admission, today at 175       - Likely 2/2 to bactrim       - Following cbc in morning       - will monitor             CODE STATUS: full code  Access: PIV  Antibiotics: vanc day 4, levo for 3 days, now on doxy d1  Diet: adult regular after surgery  DVT Prophylaxis: lovenox 40 subQ Q24hrs  GI Prophylaxis: none  Fluids: none      Disposition: On Vanc and levofloxacin switched to doxy, I&D by surgery 12/28/22, wash-out again today.     Laurent Syed DO  [facility] Internal Medicine PGY-1

## 2022-12-31 VITALS
RESPIRATION RATE: 20 BRPM | HEIGHT: 70 IN | OXYGEN SATURATION: 98 % | BODY MASS INDEX: 30.49 KG/M2 | HEART RATE: 44 BPM | SYSTOLIC BLOOD PRESSURE: 144 MMHG | DIASTOLIC BLOOD PRESSURE: 93 MMHG | TEMPERATURE: 98 F | WEIGHT: 212.94 LBS

## 2022-12-31 LAB
BACTERIA SPEC ANAEROBE CULT: NORMAL
VANCOMYCIN TROUGH SERPL-MCNC: 15 UG/ML (ref 15–20)

## 2022-12-31 PROCEDURE — 80202 ASSAY OF VANCOMYCIN: CPT | Performed by: STUDENT IN AN ORGANIZED HEALTH CARE EDUCATION/TRAINING PROGRAM

## 2022-12-31 PROCEDURE — 25000003 PHARM REV CODE 250

## 2022-12-31 PROCEDURE — 63600175 PHARM REV CODE 636 W HCPCS: Performed by: STUDENT IN AN ORGANIZED HEALTH CARE EDUCATION/TRAINING PROGRAM

## 2022-12-31 PROCEDURE — 36415 COLL VENOUS BLD VENIPUNCTURE: CPT | Performed by: STUDENT IN AN ORGANIZED HEALTH CARE EDUCATION/TRAINING PROGRAM

## 2022-12-31 PROCEDURE — 25000003 PHARM REV CODE 250: Performed by: STUDENT IN AN ORGANIZED HEALTH CARE EDUCATION/TRAINING PROGRAM

## 2022-12-31 RX ORDER — DOXYCYCLINE 100 MG/1
100 CAPSULE ORAL EVERY 12 HOURS
Qty: 10 CAPSULE | Refills: 0 | Status: SHIPPED | OUTPATIENT
Start: 2022-12-31 | End: 2023-01-05

## 2022-12-31 RX ORDER — ACETAMINOPHEN 325 MG/1
650 TABLET ORAL EVERY 8 HOURS PRN
Qty: 30 TABLET | Refills: 0 | Status: SHIPPED | OUTPATIENT
Start: 2022-12-31

## 2022-12-31 RX ORDER — DOXYCYCLINE HYCLATE 100 MG
100 TABLET ORAL EVERY 12 HOURS
Status: DISCONTINUED | OUTPATIENT
Start: 2022-12-31 | End: 2022-12-31 | Stop reason: HOSPADM

## 2022-12-31 RX ADMIN — MORPHINE SULFATE 6 MG: 2 INJECTION, SOLUTION INTRAMUSCULAR; INTRAVENOUS at 07:12

## 2022-12-31 RX ADMIN — KETOROLAC TROMETHAMINE 15 MG: 30 INJECTION, SOLUTION INTRAMUSCULAR; INTRAVENOUS at 05:12

## 2022-12-31 RX ADMIN — DOXYCYCLINE HYCLATE 100 MG: 100 TABLET, COATED ORAL at 10:12

## 2022-12-31 RX ADMIN — MORPHINE SULFATE 6 MG: 2 INJECTION, SOLUTION INTRAMUSCULAR; INTRAVENOUS at 03:12

## 2022-12-31 RX ADMIN — ACETAMINOPHEN 1000 MG: 500 TABLET ORAL at 05:12

## 2022-12-31 NOTE — DISCHARGE SUMMARY
[facility] Internal Medicine Discharge Summary    Admitting Physician: Kavitha Gordon MD  Attending Physician: Denny Choi MD  Date of Admit: 12/27/2022  Date of Discharge: 12/31/2022    Condition: Stable  Outcome: Condition has improved and patient is now ready for discharge.  DISPOSITION: Home or Self Care        Discharge Diagnoses:     Patient Active Problem List   Diagnosis    Cellulitis of left elbow    Bipolar 1 disorder    Olecranon bursitis of left elbow       Principal Problem:  Cellulitis of left elbow    Consultants and Procedures:     Consultants:  PHARMACY TO DOSE VANCOMYCIN CONSULT  IP CONSULT TO INTERNAL MEDICINE  IP CONSULT TO GENERAL SURGERY  WOUND CARE CONSULT  WOUND CARE CONSULT    Procedures:   Procedure(s):  IRRIGATION AND DEBRIDEMENT, UPPER EXTREMITY      Brief Admission History:      Dimitry Lee is a 42 y.o. male with a history of bipolar & general anxiety disorder who presented to the ED on 12/27/2022  with a primary complaint of L elbow abscess and cellulitis. Pt reports waking up Saturday 12/24/22 with a blister on his elbow which popped resulting in an abscess. Has several tattoos on his L arm with the most recent one being 2 weeks ago resulting in small blisters for which he was on bactrim for 3 weeks at time for about 3 months. Also reports having the flu last week and had lost about 10 pnds since then. Had one episode of diarrhea yesterday associated with nausea and fever of 103. He is allergic to keflex ( rash reactions). Denies IV drug use but admits to [facility]. UDS positive for amphetamines, Cannabinoids, phencyclidine.  He reports decreased ROM of the L arm with pain rated 9/10. Denies SOB, chest pain, abdominal pain, vomiting.     Upon arrival to the ED, VS were stable, labs significant for Plt 134, .2, sed rate 27, UDS + for amphetamines, Cannabinoids, phencyclidine.    Hospital Course with Pertinent Findings:      Overall uncomplicated hospital course.   Initial x-ray of the elbow negative for fracture or bony abnormality.  Patient started empirically on vancomycin. Follow up MRI showed fluid collection posterior subcutaneous adipose tissue in the region of the olecranon bursa which is somewhat infiltrative predominantly with some areas demonstrating early organization measuring 8.2 x 5.8 x 1.7 cm.  General surgery consulted for possible incision and drainage.  On upon initial evaluation, deemed fluid collection non drainable.  Next day, fluid collection became organize and deemed suitable for incision and drainage which was done.  Wound cultures taken which grew back moderate methicillin-resistant Staph aureus consistent with initial wound culture taken in emergency room.  Sensitivities revealed sensitive to tetracycline vancomycin.  Patient was treated with IV vancomycin for 4 days and levofloxacin for 3 days, followed by doxycycline 1 day.  Patient to finish 5 day course of doxycycline outpatient.  He is to follow-up with wound care outpatient.  Patient also familiar with hibiclens baths and bleaching towels and cleaning skin as he is previously with multiple wounds.    Per patient, he has been dealing with chronic infections ever since he was a child.  Reports previously has tried to get into a allergist and immunologist but unable to do so.  Workup was pursued with immunoglobulin level which revealed low IgG level of 414, low IgA level at 45, IgM level normal at 25.  Infectious workup negative for syphilis HIV.  Referral placed to both [place] and [place] for Allergy and immunology as patient agreeable to make this travel.        To address at follow-up:    - follow-up with general surgery as needed for wound check  - follow up with wound care at [facility] for wound care management  - follow-up with PCP for post hospital stay      At this time, Dimitry Lee is determined to have maximized benefits of IP hospitalization. he is discharged in stable  "condition with OP f/u recommendations and instructions. All questions answered, and patient and family verbalized agreement with the POC. They were given return precautions prior to d/c including symptoms that should prompt return to ED or to call PCP. Total time spent of DC of 35 minutes.     Discharge physical exam:  Vitals  BP: (!) 144/93  Temp: 98.2 °F (36.8 °C)  Temp src: Oral  Pulse: (!) 44  Resp: 20  SpO2: 98 %  Height: 5' 10" (177.8 cm)  Weight: 96.6 kg (212 lb 15.4 oz)    General: Patient resting comfortably in bed, in no acute distress   Eye: PERRLA, EOMI, clear conjunctiva, eyelids normal  HENT: Head-normocephalic and atraumatic  Neck: full range of motion, no thyromegaly or lymphadenopathy, trachea midline, supple, no palpable thyroid nodules  Respiratory: clear to auscultation bilaterally without wheezes, rales, rhonchi  Cardiovascular: regular rate and rhythm without murmurs.  No gallops or rubs no JVD.  Capillary refill within normal limits.  Gastrointestinal: soft, non-tender, non-distended with normal bowel sounds, without masses to palpation  Musculoskeletal: LUE with very mild decreased ROM without any significant pain. full range of motion of all other extremities/spine without limitation or discomfort  Integumentary: Left arm wrapped in Kerlix, dry with minimal erythema appreciated. Multiple tattoos noted in LUE.    Neurologic: no signs of peripheral neurological deficit, motor/sensory function intact  Psychiatric:  alert and oriented, cognitive function intact, cooperative with exam, good eye contact, judgement and insight intact, mood and affect full range.      Discharge Medications:         Medication List        START taking these medications      acetaminophen 325 MG tablet  Commonly known as: TYLENOL  Take 2 tablets (650 mg total) by mouth every 8 (eight) hours as needed for Pain (or equal to 101 degree F).     doxycycline 100 MG capsule  Commonly known as: MONODOX  Take 1 capsule (100 mg " total) by mouth every 12 (twelve) hours. for 5 days            STOP taking these medications      sulfamethoxazole-trimethoprim 800-160mg 800-160 mg Tab  Commonly known as: BACTRIM DS               Where to Get Your Medications        These medications were sent to [id number] - [place] - [address]  [address] [address]      Phone: [phone]   acetaminophen 325 MG tablet  doxycycline 100 MG capsule         Discharge Instructions:         Dimitry Altmanhuibarry is being discharged Home or Self Care.    Discharge Procedure Orders   Ambulatory referral/consult to Allergy   Standing Status: Future   Referral Priority: Routine Referral Type: Allergy Testing   Referral Reason: Specialty Services Required   Referred to Provider: [facility] Requested Specialty: Allergy   Number of Visits Requested: 1     Ambulatory referral/consult to Allergy   Standing Status: Future   Referral Priority: Routine Referral Type: Allergy Testing   Referral Reason: Specialty Services Required   Referred to Provider: MARTIN KE[facility] Requested Specialty: Allergy   Number of Visits Requested: 1     Ambulatory referral/consult to Wound Clinic   Standing Status: Future   Referral Priority: Routine Referral Type: Consultation   Referral Reason: Specialty Services Required   Requested Specialty: Wound Care   Number of Visits Requested: 1        Follow-Up Appointments:   Follow-up Information       [facility] - Emergency Dept .    Specialty: Emergency Medicine  Why: As needed, If symptoms worsen  Contact information:  [address] [address] [phone]             Primary Doctor No Follow up.    Why: For post-hospital stay.             [facility] -  Wound Care Services Follow up.    Specialty: Wound Care  Why: As scheduled  Contact information:  [address] [address] [phone]                              TIME SPENT ON DISCHARGE: 35 minutes        Laurent Syed DO  [facility] Internal Medicine PGY-1

## 2022-12-31 NOTE — PROGRESS NOTES
[facility]/General Surgery  History & Physical    Interval   S/P I&D day #3, Washout day#1  JADA [facility] HDS  Pain improvement over L elbow, dressings in place  Regular diet  Doxycycline &Vancomycin    OBJECTIVE:     Vital Signs:  Temp: 97.7 °F (36.5 °C) (12/31/22 0343)  Pulse: (!) 43 (12/31/22 0343)  Resp: 20 (12/31/22 0749)  BP: 139/88 (12/31/22 0343)  SpO2: 98 % (12/31/22 0343)    Physical Exam:  General: well developed, well nourished, no distress  HEENT: normocephalic, atraumatic, hearing grossly normal bilaterally  Cardiovascular: regular rate    Extremities:  Left elbow dressings in place, c/d/i  Abdomen: soft, non-tender to palpation, no distention, no masses/hernias  Psych/Behavioral:  Alert and oriented, appropriate affect.      Laboratory:  Labs Reviewed   C-REACTIVE PROTEIN - Abnormal; Notable for the following components:       Result Value    C-Reactive Protein 284.20 (*)     All other components within normal limits   COMPREHENSIVE METABOLIC PANEL - Abnormal; Notable for the following components:    Albumin Level 3.0 (*)     Globulin 3.9 (*)     Albumin/Globulin Ratio 0.8 (*)     All other components within normal limits   SEDIMENTATION RATE, AUTOMATED - Abnormal; Notable for the following components:    Sed Rate 27 (*)     All other components within normal limits   URINALYSIS, REFLEX TO URINE CULTURE - Abnormal; Notable for the following components:    Color, UA Orange (*)     Appearance, UA Hazy (*)     Protein, UA 1+ (*)     Ketones, UA Trace (*)     Bilirubin, UA 1+ (*)     Urobilinogen, UA +4 (*)     Leukocyte Esterase, UA 25 (*)     WBC, UA 51-99 (*)     WBC Clumps, UA Few (*)     Squamous Epithelial Cells, UA Trace (*)     Mucous, UA Many (*)     Hyaline Casts, UA >20 (*)     All other components within normal limits   DRUG SCREEN, URINE (BEAKER) - Abnormal; Notable for the following components:    Amphetamines, Urine Positive (*)     Cannabinoids, Urine Positive (*)     Opiates, Urine Positive  (*)     All other components within normal limits    Narrative:     Cut off concentrations:    Amphetamines - 1000 ng/ml  Barbiturates - 200 ng/ml  Benzodiazepine - 200 ng/ml  Cannabinoids (THC) - 50 ng/ml  Cocaine - 300 ng/ml  Fentanyl - 1.0 ng/ml  MDMA - 500 ng/ml  Opiates - 300 ng/ml   Phencyclidine (PCP) - 25 ng/ml    Specimen submitted for drug analysis and tested for pH and specific gravity in order to evaluate sample integrity. Suspect tampering if specific gravity is <1.003 and/or pH is not within the range of 4.5 - 8.0  False negatives may result form substances such as bleach added to urine.  False positives may result for the presence of a substance with similar chemical structure to the drug or its metabolite.    This test provides only a PRELIMINARY analytical test result. A more specific alternate chemical method must be used in order to obtain a confirmed analytical result. Gas chromatography/mass spectrometry (GC/MS) is the preferred confirmatory method. Other chemical confirmation methods are available. Clinical consideration and professional judgement should be applied to any drug of abuse test result, particularly when preliminary positive results are used.    Positive results will be confirmed only at the physicians request. Unconfirmed screening results are to be used only for medical purposes (treatment).        CBC WITH DIFFERENTIAL - Abnormal; Notable for the following components:    RDW 11.5 (*)     Platelet 134 (*)     Neut # 9.90 (*)     All other components within normal limits   LACTIC ACID, PLASMA - Normal   CBC W/ AUTO DIFFERENTIAL    Narrative:     The following orders were created for panel order CBC Auto Differential.  Procedure                               Abnormality         Status                     ---------                               -----------         ------                     CBC with Differential[[id number]]        Abnormal            Final result                  Please view results for these tests on the individual orders.   EXTRA TUBES    Narrative:     The following orders were created for panel order EXTRA TUBES.  Procedure                               Abnormality         Status                     ---------                               -----------         ------                     Light Blue Top Hold[[id number]]                              In process                 Red Top Hold[[id number]]                                     In process                 Gold Top Hold[[id number]]                                    In process                 Pink Top Hold[[id number]]                                    In process                   Please view results for these tests on the individual orders.   LIGHT BLUE TOP HOLD   RED TOP HOLD   GOLD TOP HOLD   PINK TOP HOLD       Diagnostic Results:  Imaging Results              MRI Elbow Joint W WO Contrast Left (Final result)  Result time 12/27/22 15:04:05      Final result by Morteza Daniel MD (12/27/22 15:04:05)                   Impression:      Fluid collection posterior subcutaneous adipose tissue in the region of the olecranon bursa which is somewhat infiltrative predominantly with some areas demonstrating early organization measuring 8.2 x 5.8 x 1.7 cm.  This is likely predominantly and non drainable fluid collection.    Extensive severe cellulitis throughout the visualized elbow.    Moderate intramuscular edema involving the flexor digitorum profundus muscle and to a lesser degree the flexor carpi ulnaris muscle suggesting myositis.    Degenerative change radiocapitellar joint.      Electronically signed by: Siomara Daniel MD  Date:    12/27/2022  Time:    15:04               Narrative:    EXAMINATION:  MRI ELBOW W WO CONTRAST LEFT    CLINICAL HISTORY:  Septic arthritis suspected, elbow, xray done;  Pain in left elbow    TECHNIQUE:  MRI left elbow performed before and after intravenous contrast using routine  protocol.    COMPARISON:  Radiograph same day    FINDINGS:  Moderate intensity edema noted within the flexor carpi ulnaris muscle and flexor digitorum profundus proximally within the field of view.  No gross muscular tissue disruption or tendon disruption.  No intramuscular fluid.  There is a region of somewhat infiltrative fluid with some areas faisal sing in the posterior subcutaneous adipose tissue near the region of the olecranon bursa measuring 8.2 x 5.8 x 1.8 cm.  Severe generalized cellulitis noted.  Bones demonstrate normal signal without fracture or destructive lesion.  There is moderate degenerative change of the radiocapitellar joint including joint space narrowing, periarticular cartilage thinning, and patchy areas of periarticular subchondral fibrocystic change.  No significant joint effusion.  Bones otherwise demonstrate normal signal.  Biceps and brachialis tendons are intact and demonstrate normal signal.  Common extensor tendon normal.  Ulnar collateral ligament and radial collateral ligaments grossly intact.  Triceps tendon normal.                                       X-Ray Elbow Complete Left (Final result)  Result time 12/27/22 07:27:06      Final result by Ashleigh Oneil MD (12/27/22 07:27:06)                   Impression:      No acute bony abnormality.      Electronically signed by: Ashleigh Oneil  Date:    12/27/2022  Time:    07:27               Narrative:    EXAMINATION:  XR ELBOW COMPLETE 3 VIEW LEFT    CLINICAL HISTORY:  Cellulitis of left upper limb    COMPARISON:  None.    FINDINGS:  There is no acute fracture or malalignment.  There is no olecranon enthesophyte.  There is no evidence of an elbow joint effusion.  Soft tissue swelling is noted.                                      ASSESSMENT:     42-year-old male with left arm cellulitis, no organized fluid collection on MRI.  Surgery consulted for drainage.    PLAN:     - Changed dressings today  - Continue to change dressings  daily  - Clear for discharge from surgery perspective  - PO antibiotics per primary  - Rest of care per primary  Gonzalo Shine  [facility] PGY-1    Agree with above documentation and plan.    Saul Cee MD  [facility] General Surgery PGY3

## 2023-01-01 LAB
BACTERIA BLD CULT: NORMAL
BACTERIA BLD CULT: NORMAL

## 2023-01-03 LAB — POCT GLUCOSE: 83 MG/DL (ref 70–110)

## 2023-01-03 NOTE — PT/OT/SLP PROGRESS
Occupational Therapy      Patient Name:  Dimitry Jeronimo Jesus   MRN:  [id number]    OT eval not completed.    1/3/2023

## 2023-01-11 ENCOUNTER — HOSPITAL ENCOUNTER (OUTPATIENT)
Dept: WOUND CARE | Facility: HOSPITAL | Age: 43
Discharge: HOME | End: 2023-01-11
Attending: FAMILY MEDICINE
Payer: MEDICAID

## 2023-01-11 VITALS — SYSTOLIC BLOOD PRESSURE: 136 MMHG | DIASTOLIC BLOOD PRESSURE: 84 MMHG | TEMPERATURE: 98 F | HEART RATE: 86 BPM

## 2023-01-11 DIAGNOSIS — S51.002A ELBOW WOUND, LEFT, INITIAL ENCOUNTER: Primary | ICD-10-CM

## 2023-01-11 PROCEDURE — 99211 OFF/OP EST MAY X REQ PHY/QHP: CPT

## 2023-01-11 NOTE — PROGRESS NOTES
CHIEF COMPLAINT:  Chief Complaint   Patient presents with    Left elbow wound          HISTORY OF  PRESENT ILLNESS:  42 y.o. White male being seen today for left elbow abscess. Wound has been present for about 3 weeks.  Current wound care:  collagen.  Wound care being done every 3 days.  Patient was admitted to the hospital in late December with a large abscess of the left elbow.  Started off as blisters after a tattoo and then area had swelling and needed incision and drainage.  He was on IV antibiotics.  He just finished his course of p.o. antibiotics.      REVIEW OF SYSTEMS:  Review of Systems   Constitutional:  Negative for chills and fever.   Respiratory:  Negative for cough.    Gastrointestinal:  Negative for nausea.   Musculoskeletal:         As stated in HPI   Skin:         As stated in HPI   Psychiatric/Behavioral: Negative.       Past Medical History:   Diagnosis Date    Bipolar disorder, unspecified     CLARE (generalized anxiety disorder)     Testicular hypofunction       Past Surgical History:   Procedure Laterality Date    FOOT SURGERY Left     INCISION AND DRAINAGE, UPPER EXTREMITY Left 12/28/2022    Procedure: INCISION AND DRAINAGE, UPPER EXTREMITY;  Surgeon: Levon Gardner MD;  Location: [facility];  Service: General;  Laterality: Left;  Left Elbow, have black arm table    IRRIGATION AND DEBRIDEMENT OF UPPER EXTREMITY  12/30/2022    Procedure: IRRIGATION AND DEBRIDEMENT, UPPER EXTREMITY;  Surgeon: Levon Gardner MD;  Location: [facility];  Service: General;;    WRIST SURGERY        Social History     Socioeconomic History    Marital status:    Tobacco Use    Smoking status: Every Day     Types: Vaping with nicotine    Smokeless tobacco: Never   Substance and Sexual Activity    Alcohol use: Not Currently     Social Determinants of Health     Financial Resource Strain: Low Risk     Difficulty of Paying Living Expenses: Not hard at all   Food Insecurity: No Food Insecurity    Worried About  Running Out of Food in the Last Year: Never true    Ran Out of Food in the Last Year: Never true   Transportation Needs: Unknown    Lack of Transportation (Medical): No   Physical Activity: Insufficiently Active    Days of Exercise per Week: 1 day    Minutes of Exercise per Session: 30 min   Stress: No Stress Concern Present    Feeling of Stress : Not at all   Social Connections: Socially Isolated    Frequency of Communication with Friends and Family: More than three times a week    Frequency of Social Gatherings with Friends and Family: More than three times a week    Attends Taoism Services: Never    Active Member of Clubs or Organizations: No    Attends Club or Organization Meetings: Never    Marital Status:    Housing Stability: Low Risk     Unable to Pay for Housing in the Last Year: No    Number of Places Lived in the Last Year: 1    Unstable Housing in the Last Year: No   .      Review of Most Recent Wound Care-Related Labs:  Lab Results   Component Value Date/Time    WBC 5.0 12/30/2022 03:56 AM    RBC 3.78 (L) 12/30/2022 03:56 AM    HGB 11.4 (L) 12/30/2022 03:56 AM    HCT 33.5 (L) 12/30/2022 03:56 AM    MCV 88.6 12/30/2022 03:56 AM    MCH 30.2 12/30/2022 03:56 AM    CREATININE 0.76 12/30/2022 03:56 AM    HGBA1C 5.1 11/25/2022 07:52 AM    .20 (H) 12/27/2022 07:17 AM        Wound-Related Imaging:           PHYSICAL EXAMINATION:  Blood pressure 136/84, pulse 86, temperature 97.5 °F (36.4 °C).    General:  No acute distress.   Respiratory: Non labored.  No coughing.  Musculoskeletal:  Left elbow full range of motion  Neurologic:  A&O X 3.    Psychiatric:  Calm, cooperative  Integumentary:          Incision/Site 12/28/22 1132 Left Elbow (Active)   12/28/22 1132   Present Prior to Hospital Arrival?:    Side: Left   Location: Elbow   Orientation:    Incision Type:    Closure Method:    Additional Comments:    Removal Indication and Assessment:    Wound Outcome:    Removal Indications:    Dressing  Appearance Moist drainage 01/11/23 1007   Drainage Amount Moderate 01/11/23 1007   Appearance Slough 01/11/23 1007   Wound Length (cm) 2 cm 01/11/23 1007   Wound Width (cm) 1.5 cm 01/11/23 1007   Wound Depth (cm) 0.2 cm 01/11/23 1007   Wound Volume (cm^3) 0.6 cm^3 01/11/23 1007   Wound Surface Area (cm^2) 3 cm^2 01/11/23 [facility]   Tunneling (depth (cm)/location) 1:00-2.5cm, 3:00-3cm, 5:00-2.0cm 01/11/23 1038   Care Cleansed with:;Antimicrobial agent 01/11/23 1038       ASSESSMENT/PLAN:    Patient Active Problem List    Diagnosis Date Noted    Cellulitis of left elbow 12/27/2022    Bipolar 1 disorder 12/27/2022    Olecranon bursitis of left elbow 12/27/2022     Left elbow wound-clean with Dakin's daily followed by secondary dressing  Return to clinic in 2 weeks

## 2023-01-25 ENCOUNTER — HOSPITAL ENCOUNTER (OUTPATIENT)
Dept: WOUND CARE | Facility: HOSPITAL | Age: 43
Discharge: HOME | End: 2023-01-25
Attending: FAMILY MEDICINE
Payer: MEDICAID

## 2023-01-25 VITALS — TEMPERATURE: 98 F | SYSTOLIC BLOOD PRESSURE: 120 MMHG | HEART RATE: 88 BPM | DIASTOLIC BLOOD PRESSURE: 83 MMHG

## 2023-01-25 DIAGNOSIS — S41.102D: Primary | ICD-10-CM

## 2023-01-25 PROCEDURE — 99211 OFF/OP EST MAY X REQ PHY/QHP: CPT

## 2023-01-25 NOTE — PROGRESS NOTES
CHIEF COMPLAINT:  Chief Complaint   Patient presents with    Left elbow wound      HISTORY OF  PRESENT ILLNESS:  42 y.o. White male being seen today for left elbow abscess. Wound has been present for about 3 weeks.  Current wound care:  collagen.  Wound care being done every 3 days.  Patient was admitted to the hospital in late December with a large abscess of the left elbow.  Started off as blisters after a tattoo and then area had swelling and needed incision and drainage.  He was on IV antibiotics.  He just finished his course of p.o. antibiotics.      Today's information:  Patient here for follow-up left elbow wound.  He has been applying Dakin's followed by collagen every other day.  He denies any drainage.  Swelling has decreased and he is having more range of motion of left elbow.   He was placed on doxycycline by dermatologist for staph colonization.    REVIEW OF SYSTEMS:  Review of Systems   Constitutional:  Negative for chills and fever.   Respiratory:  Negative for cough.    Gastrointestinal:  Negative for nausea.   Musculoskeletal:         As stated in HPI   Skin:         As stated in HPI   Psychiatric/Behavioral: Negative.       Past Medical History:   Diagnosis Date    Bipolar disorder, unspecified     CLARE (generalized anxiety disorder)     Testicular hypofunction       Past Surgical History:   Procedure Laterality Date    FOOT SURGERY Left     INCISION AND DRAINAGE, UPPER EXTREMITY Left 12/28/2022    Procedure: INCISION AND DRAINAGE, UPPER EXTREMITY;  Surgeon: Levon Gardner MD;  Location: [facility];  Service: General;  Laterality: Left;  Left Elbow, have black arm table    IRRIGATION AND DEBRIDEMENT OF UPPER EXTREMITY  12/30/2022    Procedure: IRRIGATION AND DEBRIDEMENT, UPPER EXTREMITY;  Surgeon: Levon Gardner MD;  Location: [facility];  Service: General;;    WRIST SURGERY        Social History     Socioeconomic History    Marital status:    Tobacco Use    Smoking status: Every Day      Types: Vaping with nicotine    Smokeless tobacco: Never   Substance and Sexual Activity    Alcohol use: Not Currently     Social Determinants of Health     Financial Resource Strain: Low Risk     Difficulty of Paying Living Expenses: Not hard at all   Food Insecurity: No Food Insecurity    Worried About Running Out of Food in the Last Year: Never true    Ran Out of Food in the Last Year: Never true   Transportation Needs: Unknown    Lack of Transportation (Medical): No   Physical Activity: Insufficiently Active    Days of Exercise per Week: 1 day    Minutes of Exercise per Session: 30 min   Stress: No Stress Concern Present    Feeling of Stress : Not at all   Social Connections: Socially Isolated    Frequency of Communication with Friends and Family: More than three times a week    Frequency of Social Gatherings with Friends and Family: More than three times a week    Attends Yazidism Services: Never    Active Member of Clubs or Organizations: No    Attends Club or Organization Meetings: Never    Marital Status:    Housing Stability: Low Risk     Unable to Pay for Housing in the Last Year: No    Number of Places Lived in the Last Year: 1    Unstable Housing in the Last Year: No   .      Review of Most Recent Wound Care-Related Labs:  Lab Results   Component Value Date/Time    WBC 5.0 12/30/2022 03:56 AM    RBC 3.78 (L) 12/30/2022 03:56 AM    HGB 11.4 (L) 12/30/2022 03:56 AM    HCT 33.5 (L) 12/30/2022 03:56 AM    MCV 88.6 12/30/2022 03:56 AM    MCH 30.2 12/30/2022 03:56 AM    CREATININE 0.76 12/30/2022 03:56 AM    HGBA1C 5.1 11/25/2022 07:52 AM    .20 (H) 12/27/2022 07:17 AM        Wound-Related Imaging:        PHYSICAL EXAMINATION:  Blood pressure 120/83, pulse 88, temperature 97.9 °F (36.6 °C).    General:  No acute distress.   Respiratory: Non labored.  No coughing.  Musculoskeletal:  Left elbow full range of motion  Neurologic:  A&O X 3.    Psychiatric:  Calm, cooperative  Integumentary:           Incision/Site 12/28/22 1132 Left Elbow (Active)   12/28/22 1132   Present Prior to Hospital Arrival?:    Side: Left   Location: Elbow   Orientation:    Incision Type:    Closure Method:    Additional Comments:    Removal Indication and Assessment:    Wound Outcome:    Removal Indications:    Dressing Appearance Moist drainage 01/25/23 0936   Drainage Amount Moderate 01/25/23 0936   Drainage Characteristics/Odor Serosanguineous 01/25/23 0936   Wound Length (cm) 1.7 cm 01/25/23 0936   Wound Width (cm) 1.5 cm 01/25/23 0936   Wound Depth (cm) 0.1 cm 01/25/23 0936   Wound Volume (cm^3) 0.255 cm^3 01/25/23 0936   Wound Surface Area (cm^2) 2.55 cm^2 01/25/23 0936   Care Cleansed with:;Antimicrobial agent 01/25/23 0936       ASSESSMENT/PLAN:    Patient Active Problem List    Diagnosis Date Noted    Cellulitis of left elbow 12/27/2022    Bipolar 1 disorder 12/27/2022    Olecranon bursitis of left elbow 12/27/2022     Left elbow wound-clean with Dakin's daily followed by collagen every other day  Return to clinic prn

## 2023-01-30 ENCOUNTER — OFFICE VISIT (OUTPATIENT)
Dept: INTERNAL MEDICINE | Facility: CLINIC | Age: 43
End: 2023-01-30
Payer: MEDICAID

## 2023-01-30 VITALS
DIASTOLIC BLOOD PRESSURE: 84 MMHG | SYSTOLIC BLOOD PRESSURE: 115 MMHG | RESPIRATION RATE: 18 BRPM | BODY MASS INDEX: 31.07 KG/M2 | OXYGEN SATURATION: 97 % | HEIGHT: 70 IN | TEMPERATURE: 98 F | WEIGHT: 217 LBS | HEART RATE: 87 BPM

## 2023-01-30 DIAGNOSIS — L03.114 CELLULITIS OF LEFT UPPER EXTREMITY: Primary | ICD-10-CM

## 2023-01-30 DIAGNOSIS — D84.9 IMMUNODEFICIENCY DISEASE: ICD-10-CM

## 2023-01-30 PROCEDURE — 99215 OFFICE O/P EST HI 40 MIN: CPT | Mod: PBBFAC

## 2023-01-30 RX ORDER — SILDENAFIL 100 MG/1
TABLET, FILM COATED ORAL
COMMUNITY
Start: 2022-09-22 | End: 2023-04-27 | Stop reason: SDUPTHER

## 2023-01-30 RX ORDER — MUPIROCIN 20 MG/G
OINTMENT TOPICAL
COMMUNITY
Start: 2023-01-20

## 2023-01-30 RX ORDER — GUANFACINE 1 MG/1
1 TABLET ORAL NIGHTLY
COMMUNITY

## 2023-01-30 RX ORDER — SULFAMETHOXAZOLE AND TRIMETHOPRIM 800; 160 MG/1; MG/1
1 TABLET ORAL 2 TIMES DAILY
COMMUNITY
Start: 2023-01-26 | End: 2023-04-27 | Stop reason: ALTCHOICE

## 2023-01-30 RX ORDER — DEXTROAMPHETAMINE SACCHARATE, AMPHETAMINE ASPARTATE, DEXTROAMPHETAMINE SULFATE AND AMPHETAMINE SULFATE 2.5; 2.5; 2.5; 2.5 MG/1; MG/1; MG/1; MG/1
TABLET ORAL
COMMUNITY

## 2023-01-30 RX ORDER — TRAZODONE HYDROCHLORIDE 300 MG/1
1 TABLET ORAL NIGHTLY
COMMUNITY

## 2023-01-30 RX ORDER — OXCARBAZEPINE 600 MG/1
600 TABLET, FILM COATED ORAL
COMMUNITY
Start: 2021-12-10

## 2023-01-30 RX ORDER — BUSPIRONE HYDROCHLORIDE 15 MG/1
15 TABLET ORAL
COMMUNITY
Start: 2022-04-25

## 2023-01-30 RX ORDER — FLUVOXAMINE MALEATE 100 MG/1
150 TABLET, COATED ORAL
COMMUNITY

## 2023-01-30 RX ORDER — CARIPRAZINE 6 MG/1
6 CAPSULE, GELATIN COATED ORAL
COMMUNITY
Start: 2023-01-19

## 2023-01-30 RX ORDER — ROPINIROLE 2 MG/1
2 TABLET, FILM COATED ORAL
COMMUNITY
Start: 2022-04-25

## 2023-01-30 RX ORDER — MIRTAZAPINE 45 MG/1
1 TABLET, FILM COATED ORAL NIGHTLY
COMMUNITY
Start: 2021-12-10

## 2023-01-30 NOTE — PROGRESS NOTES
INTERNAL MEDICINE RESIDENT CLINIC  [facility] NOTE    Patient Name: Dimitry Lee  YOB: 1980    PRESENTING HISTORY       History of Present Illness:  Dimitry Lee is a 42 y.o. male with a history of bipolar & general anxiety disorder who presents for P/U wards follow up.    Per D/C summary: He presented to the ED on 12/27/2022 with a primary complaint of L elbow abscess and cellulitis. Pt reports waking up Saturday 12/24/22 with a blister on his elbow which popped resulting in an abscess. Has several tattoos on his L arm with the most recent one being 2 weeks ago resulting in small blisters for which he was on bactrim for 3 weeks at time for about 3 months. Also reports having the flu last week and had lost about 10 pnds since then. Had one episode of diarrhea yesterday associated with nausea and fever of 103. He is allergic to keflex ( rash reactions). Denies IV drug use but admits to [facility]. UDS positive for amphetamines, Cannabinoids, phencyclidine.  He reports decreased ROM of the L arm with pain rated 9/10. Denies SOB, chest pain, abdominal pain, vomiting.  Worked up for immunodeficiencies inpatient, as well as given antibiotics after speciation of MRSA showed sensitivity to both vancomycin and doxycycline.  Doxycycline was given on outpatient and patient has followed up with wound care since then.  Last visit when wound care was 1/25 showing improvement.    Today:  He presents today, after being discharged from wound care visits 1/25.  He has been seeing dermatologist who prescribed Bactrim to be completed for 1 week, he is on day 3.  Recurrent infections have been ongoing for the past 1-2 years.  Has history of having to receive immunoglobulins as a child, but recurrent infections of only been occurring the past year to 2 years.  Otherwise he has no problems today, he was get established with a PCP.    CURRENT MEDICATIONS      Current Outpatient Medications on File Prior  to Visit   Medication Sig    acetaminophen (TYLENOL) 325 MG tablet Take 2 tablets (650 mg total) by mouth every 8 (eight) hours as needed for Pain (or equal to 101 degree F).     No current facility-administered medications on file prior to visit.         Review of Systems   Constitutional:  Negative for chills, fever and weight loss.   HENT:  Negative for hearing loss.    Eyes:  Negative for blurred vision.   Respiratory:  Negative for cough.    Cardiovascular:  Negative for chest pain.   Gastrointestinal:  Negative for heartburn and vomiting.   Genitourinary:  Negative for urgency.   Musculoskeletal:  Negative for myalgias.   Skin:  Negative for rash.   Neurological:  Negative for dizziness.   Endo/Heme/Allergies:  Does not bruise/bleed easily.   Psychiatric/Behavioral:  Negative for depression and suicidal ideas.      PAST HISTORY:     Past Medical History:   Diagnosis Date    Bipolar disorder, unspecified     CLARE (generalized anxiety disorder)     Testicular hypofunction        Past Surgical History:   Procedure Laterality Date    FOOT SURGERY Left     INCISION AND DRAINAGE, UPPER EXTREMITY Left 12/28/2022    Procedure: INCISION AND DRAINAGE, UPPER EXTREMITY;  Surgeon: Levon Gardner MD;  Location: [facility];  Service: General;  Laterality: Left;  Left Elbow, have black arm table    IRRIGATION AND DEBRIDEMENT OF UPPER EXTREMITY  12/30/2022    Procedure: IRRIGATION AND DEBRIDEMENT, UPPER EXTREMITY;  Surgeon: Levon Gardner MD;  Location: [facility];  Service: General;;    WRIST SURGERY         No family history on file.    Social History     Socioeconomic History    Marital status:    Tobacco Use    Smoking status: Every Day     Types: Vaping with nicotine    Smokeless tobacco: Never   Substance and Sexual Activity    Alcohol use: Not Currently     Social Determinants of Health     Financial Resource Strain: Low Risk     Difficulty of Paying Living Expenses: Not hard at all   Food Insecurity: No Food  Insecurity    Worried About Running Out of Food in the Last Year: Never true    Ran Out of Food in the Last Year: Never true   Transportation Needs: Unknown    Lack of Transportation (Medical): No   Physical Activity: Insufficiently Active    Days of Exercise per Week: 1 day    Minutes of Exercise per Session: 30 min   Stress: No Stress Concern Present    Feeling of Stress : Not at all   Social Connections: Socially Isolated    Frequency of Communication with Friends and Family: More than three times a week    Frequency of Social Gatherings with Friends and Family: More than three times a week    Attends Yazidism Services: Never    Active Member of Clubs or Organizations: No    Attends Club or Organization Meetings: Never    Marital Status:    Housing Stability: Low Risk     Unable to Pay for Housing in the Last Year: No    Number of Places Lived in the Last Year: 1    Unstable Housing in the Last Year: No       Review of patient's allergies indicates:   Allergen Reactions    Abilify [aripiprazole]     Prednisone     Keflex [cephalexin] Rash       OBJECTIVE:   Vital Signs:  There were no vitals filed for this visit.    No results found for this or any previous visit (from the past 24 hour(s)).      Physical Exam  Constitutional:       Appearance: Normal appearance. He is not ill-appearing.   HENT:      Head: Normocephalic.      Nose: Nose normal.      Mouth/Throat:      Mouth: Mucous membranes are moist.   Eyes:      Pupils: Pupils are equal, round, and reactive to light.   Cardiovascular:      Rate and Rhythm: Normal rate and regular rhythm.      Pulses: Normal pulses.      Heart sounds: Normal heart sounds.   Pulmonary:      Effort: Pulmonary effort is normal.      Breath sounds: Normal breath sounds.   Abdominal:      General: Abdomen is flat.   Musculoskeletal:         General: Normal range of motion.      Comments: Resolving Left Elbow, superficial wound   Skin:     General: Skin is warm and dry.       Capillary Refill: Capillary refill takes less than 2 seconds.   Neurological:      General: No focal deficit present.      Mental Status: He is alert.   Psychiatric:         Mood and Affect: Mood normal.     Laboratory  CMP:   Recent Labs   Lab 09/03/21  0925 09/21/21  0924 10/05/21  0748 11/15/21  0707 11/25/22  0752 12/28/22 0355 12/29/22 0336 12/30/22  0356   Sodium Level 139   < > 140 142   < > 138 141 140   Potassium Level 4.1   < > 3.9 4.0   < > 3.8 4.3 3.4 L   Chloride 104   < > 104 106   < > 106 109 H 107   Carbon Dioxide 25   < > 24 27   < > 25 25 25   Blood Urea Nitrogen 8.8 L   < > 9.0 8.9   < > 9.0 12.8 13.1   Creatinine 1.03   < > 1.04 0.91   < > 0.75 0.67 L 0.76   Glucose Level 96   < > 111 H 115 H   < > 94 120 H 106 H   Calcium Level Total 9.0   < > 9.2 9.3   < > 8.5 8.7 8.3 L   Albumin Level 3.8  --  4.1 3.9   < > 2.3 L 2.3 L 2.4 L   Bilirubin Total 0.6  --  0.5 0.4   < > 0.4 0.2 0.2   Bilirubin Direct 0.2  --  0.2 0.2  --   --   --   --    Bilirubin Indirect 0.40  --  0.30 0.20  --   --   --   --    Aspartate Aminotransferase 17  --  15 14   < > 9 8 17   Alanine Aminotransferase 16  --  18 16   < > 10 10 11   Alkaline Phosphatase 60  --  59 58   < > 61 65 60    < > = values in this interval not displayed.     CBC:   Recent Labs   Lab 12/28/22 0355 12/29/22 0336 12/30/22 0356   WBC 7.5 6.1 5.0   Neut # 5.92 4.99 2.96   RBC 4.30 L 4.02 L 3.78 L   Hgb 13.3 L 12.3 L 11.4 L   Hct 37.2 L 35.3 L 33.5 L   Platelet 124 L 146 175   MCV 86.5 87.8 88.6   RDW 11.8 11.8 11.9     FLP:   Recent Labs   Lab 09/03/21  0925 11/15/21  0707 11/25/22  0752   Cholesterol Total 209 H 192 216 H   HDL Cholesterol 40 41 52   LDL Cholesterol 157.00 H 133.00 149.00 H   Triglyceride 60 92 76     DM:   Recent Labs   Lab 11/15/21  0707 11/25/22  0752 12/27/22  0717 12/28/22  0355 12/29/22  0336 12/30/22  0356   Hemoglobin A1c 5.3 5.1  --   --   --   --    Creatinine 0.91 0.93   < > 0.75 0.67 L 0.76    < > = values in this  interval not displayed.     Thyroid:   Recent Labs   Lab 07/06/21  0955 11/15/21  0707 11/25/22  0752   Thyroid Stimulating Hormone 1.5054 3.2536 1.1618   Thyroxine Free  --   --  0.69 L     Anemia:   Recent Labs   Lab 12/30/22  0356   Hgb 11.4 L   Hct 33.5 L             ASSESSMENT & PLAN:   Left elbow abscess/cellulitis  - has had wound care follow up, see note with picture of progress 1/25, was discharged   -inpatient cultures were positive for MRSA, that was sensitive to tetracycline and vancomycin.  Transitioned to oral doxycycline on outpatient and patient completed course  -seeing a dermatologist, who prescribed him TMP/SMX to complete for 4 more days.  Etiology may be tattoo related.    History of chronic wound infections, consideration for primary immunodeficiency  - workup on inpatient showed low IgG, low IgA but normal IgM  - referral was made to allergologist, immunologist  - referred again today, asked patient to call    He will establish with our clinic.  Ideally can follow up in 3 months.  Future Appointments     Future Appointments   Date Time Provider Department Center   1/30/2023  1:00 PM [facility], [facility] INTERNAL MEDICINE [facility] IM RES [facility]      No orders of the defined types were placed in this encounter.        Discussed with Dr. Gordon  - Staff Attestation to Follow    Jersey Lundberg MD  [facility] Internal Medicine PGY-1

## 2023-04-27 ENCOUNTER — LAB VISIT (OUTPATIENT)
Dept: LAB | Facility: HOSPITAL | Age: 43
End: 2023-04-27
Payer: MEDICAID

## 2023-04-27 ENCOUNTER — OFFICE VISIT (OUTPATIENT)
Dept: INTERNAL MEDICINE | Facility: CLINIC | Age: 43
End: 2023-04-27
Payer: MEDICAID

## 2023-04-27 VITALS
RESPIRATION RATE: 18 BRPM | BODY MASS INDEX: 32.07 KG/M2 | WEIGHT: 224 LBS | HEIGHT: 70 IN | TEMPERATURE: 98 F | HEART RATE: 104 BPM | OXYGEN SATURATION: 97 % | SYSTOLIC BLOOD PRESSURE: 117 MMHG | DIASTOLIC BLOOD PRESSURE: 89 MMHG

## 2023-04-27 DIAGNOSIS — Z76.89 ESTABLISHING CARE WITH NEW DOCTOR, ENCOUNTER FOR: Primary | ICD-10-CM

## 2023-04-27 DIAGNOSIS — Z86.19 HISTORY OF RECURRENT INFECTION: ICD-10-CM

## 2023-04-27 DIAGNOSIS — E34.9 TESTOSTERONE DEFICIENCY: ICD-10-CM

## 2023-04-27 DIAGNOSIS — Z76.89 ESTABLISHING CARE WITH NEW DOCTOR, ENCOUNTER FOR: ICD-10-CM

## 2023-04-27 LAB
ALBUMIN SERPL-MCNC: 4.5 G/DL (ref 3.5–5)
ALBUMIN/GLOB SERPL: 1.6 RATIO (ref 1.1–2)
ALP SERPL-CCNC: 74 UNIT/L (ref 40–150)
ALT SERPL-CCNC: 15 UNIT/L (ref 0–55)
AST SERPL-CCNC: 14 UNIT/L (ref 5–34)
BASOPHILS # BLD AUTO: 0.06 X10(3)/MCL (ref 0–0.2)
BASOPHILS NFR BLD AUTO: 0.8 %
BILIRUBIN DIRECT+TOT PNL SERPL-MCNC: 0.4 MG/DL
BUN SERPL-MCNC: 12.6 MG/DL (ref 8.9–20.6)
CALCIUM SERPL-MCNC: 9.6 MG/DL (ref 8.4–10.2)
CHLORIDE SERPL-SCNC: 104 MMOL/L (ref 98–107)
CHOLEST SERPL-MCNC: 239 MG/DL
CHOLEST/HDLC SERPL: 5 {RATIO} (ref 0–5)
CO2 SERPL-SCNC: 26 MMOL/L (ref 22–29)
CREAT SERPL-MCNC: 1.05 MG/DL (ref 0.73–1.18)
DEPRECATED CALCIDIOL+CALCIFEROL SERPL-MC: 38.7 NG/ML (ref 30–80)
EOSINOPHIL # BLD AUTO: 0.09 X10(3)/MCL (ref 0–0.9)
EOSINOPHIL NFR BLD AUTO: 1.1 %
ERYTHROCYTE [DISTWIDTH] IN BLOOD BY AUTOMATED COUNT: 12.7 % (ref 11.5–17)
EST. AVERAGE GLUCOSE BLD GHB EST-MCNC: 102.5 MG/DL
FOLATE SERPL-MCNC: 11 NG/ML (ref 7–31.4)
GFR SERPLBLD CREATININE-BSD FMLA CKD-EPI: >60 MLS/MIN/1.73/M2
GLOBULIN SER-MCNC: 2.9 GM/DL (ref 2.4–3.5)
GLUCOSE SERPL-MCNC: 122 MG/DL (ref 74–100)
HAV IGM SERPL QL IA: NONREACTIVE
HBA1C MFR BLD: 5.2 %
HBV CORE IGM SERPL QL IA: NONREACTIVE
HBV SURFACE AG SERPL QL IA: NONREACTIVE
HCT VFR BLD AUTO: 48 % (ref 42–52)
HCV AB SERPL QL IA: NONREACTIVE
HDLC SERPL-MCNC: 49 MG/DL (ref 35–60)
HGB BLD-MCNC: 17.3 G/DL (ref 14–18)
IMM GRANULOCYTES # BLD AUTO: 0.06 X10(3)/MCL (ref 0–0.04)
IMM GRANULOCYTES NFR BLD AUTO: 0.8 %
LDLC SERPL CALC-MCNC: 174 MG/DL (ref 50–140)
LYMPHOCYTES # BLD AUTO: 1.41 X10(3)/MCL (ref 0.6–4.6)
LYMPHOCYTES NFR BLD AUTO: 17.8 %
MCH RBC QN AUTO: 30.9 PG (ref 27–31)
MCHC RBC AUTO-ENTMCNC: 36 G/DL (ref 33–36)
MCV RBC AUTO: 85.9 FL (ref 80–94)
MONOCYTES # BLD AUTO: 0.68 X10(3)/MCL (ref 0.1–1.3)
MONOCYTES NFR BLD AUTO: 8.6 %
NEUTROPHILS # BLD AUTO: 5.6 X10(3)/MCL (ref 2.1–9.2)
NEUTROPHILS NFR BLD AUTO: 70.9 %
NRBC BLD AUTO-RTO: 0.3 %
PLATELET # BLD AUTO: 209 X10(3)/MCL (ref 130–400)
PMV BLD AUTO: 9.2 FL (ref 7.4–10.4)
POTASSIUM SERPL-SCNC: 4.1 MMOL/L (ref 3.5–5.1)
PROT SERPL-MCNC: 7.4 GM/DL (ref 6.4–8.3)
RBC # BLD AUTO: 5.59 X10(6)/MCL (ref 4.7–6.1)
SODIUM SERPL-SCNC: 139 MMOL/L (ref 136–145)
TESTOST SERPL-MCNC: 419.96 NG/DL (ref 240.24–870.68)
TRIGL SERPL-MCNC: 82 MG/DL (ref 34–140)
TSH SERPL-ACNC: 1.16 UIU/ML (ref 0.35–4.94)
VIT B12 SERPL-MCNC: 564 PG/ML (ref 213–816)
VLDLC SERPL CALC-MCNC: 16 MG/DL
WBC # SPEC AUTO: 7.9 X10(3)/MCL (ref 4.5–11.5)

## 2023-04-27 PROCEDURE — 99214 OFFICE O/P EST MOD 30 MIN: CPT | Mod: PBBFAC

## 2023-04-27 PROCEDURE — 84403 ASSAY OF TOTAL TESTOSTERONE: CPT

## 2023-04-27 PROCEDURE — 82607 VITAMIN B-12: CPT

## 2023-04-27 PROCEDURE — 85025 COMPLETE CBC W/AUTO DIFF WBC: CPT

## 2023-04-27 PROCEDURE — 36415 COLL VENOUS BLD VENIPUNCTURE: CPT

## 2023-04-27 PROCEDURE — 80053 COMPREHEN METABOLIC PANEL: CPT

## 2023-04-27 PROCEDURE — 84443 ASSAY THYROID STIM HORMONE: CPT

## 2023-04-27 PROCEDURE — 82306 VITAMIN D 25 HYDROXY: CPT

## 2023-04-27 PROCEDURE — 82746 ASSAY OF FOLIC ACID SERUM: CPT

## 2023-04-27 PROCEDURE — 80074 ACUTE HEPATITIS PANEL: CPT

## 2023-04-27 PROCEDURE — 80061 LIPID PANEL: CPT

## 2023-04-27 PROCEDURE — 83036 HEMOGLOBIN GLYCOSYLATED A1C: CPT

## 2023-04-27 PROCEDURE — 90677 PCV20 VACCINE IM: CPT | Mod: PBBFAC

## 2023-04-27 RX ORDER — SILDENAFIL 100 MG/1
TABLET, FILM COATED ORAL
Qty: 30 TABLET | Refills: 3 | Status: SHIPPED | OUTPATIENT
Start: 2023-04-27

## 2023-04-27 NOTE — PROGRESS NOTES
INTERNAL MEDICINE RESIDENT CLINIC  CLINIC NOTE    Patient Name: Dimitry Lee  YOB: 1980    PRESENTING HISTORY       History of Present Illness:  Dimitry Lee is a 42 y.o. male with a history of bipolar & general anxiety disorder who presents to establish care.    Per D/C summary: He presented to the ED on 12/27/2022 with a primary complaint of L elbow abscess and cellulitis. Pt reports waking up Saturday 12/24/22 with a blister on his elbow which popped resulting in an abscess. Has several tattoos on his L arm with the most recent one being 2 weeks ago resulting in small blisters for which he was on bactrim for 3 weeks at time for about 3 months. Also reports having the flu last week and had lost about 10 pnds since then. Had one episode of diarrhea yesterday associated with nausea and fever of 103. He is allergic to keflex ( rash reactions). Denies IV drug use but admits to [facility]. UDS positive for amphetamines, Cannabinoids, phencyclidine.  He reports decreased ROM of the L arm with pain rated 9/10. Denies SOB, chest pain, abdominal pain, vomiting.  Worked up for immunodeficiencies inpatient, as well as given antibiotics after speciation of MRSA showed sensitivity to both vancomycin and doxycycline.  Doxycycline was given on outpatient and patient has followed up with wound care since then.  Last visit when wound care was 1/25 showing improvement.    Previous Note at [facility]:  He presents today, after being discharged from wound care visits 1/25.  He has been seeing dermatologist who prescribed Bactrim to be completed for 1 week, he is on day 3.  Recurrent infections have been ongoing for the past 1-2 years.  Has history of having to receive immunoglobulins as a child, but recurrent infections of only been occurring the past year to 2 years.  Otherwise he has no problems today, he was get established with a PCP.    Today:  Patient is here to establish care.  Three main points  tackled today.  First discussed previous medical history including low testosterone, as well as bipolar disorder currently treated on multiple medications began last June (2022).  He states that he is doing well on this regimen, mood doing well.  He does endorse episodes of tremulousness, lasting 2-45 seconds generalized that forced him to have to hold onto objects though he does not lose muscle tone during this time.  Nor does he lose consciousness, he was able to call for help.  Additionally he does endorse fidgety movements, noted he is on ropinirole.  He also endorses that he has previously been treated with testosterone, we will get levels today.  He is amenable to pneumonia vaccination today.  2nd point would be getting laboratories for follow up, baseline screenings.  Third point would be to follow up with immunology and we will clarify referral today.    CURRENT MEDICATIONS      Current Outpatient Medications on File Prior to Visit   Medication Sig    acetaminophen (TYLENOL) 325 MG tablet Take 2 tablets (650 mg total) by mouth every 8 (eight) hours as needed for Pain (or equal to 101 degree F).    busPIRone (BUSPAR) 15 MG tablet Take 15 mg by mouth.    dextroamphetamine-amphetamine 10 mg Tab     fluvoxaMINE (LUVOX) 100 MG tablet Take 150 mg by mouth.    guanFACINE (TENEX) 1 MG Tab Take 1 tablet by mouth every evening.    mirtazapine (REMERON) 45 MG tablet Take 1 tablet by mouth every evening.    mupirocin (BACTROBAN) 2 % ointment APPLY A SMALL AMOUNT OINTMENT TOPICALLY TO INNER NOSE TWICE DAILY    OXcarbazepine (TRILEPTAL) 600 MG Tab Take 600 mg by mouth.    rOPINIRole (REQUIP) 2 MG tablet Take 2 mg by mouth.    traZODone (DESYREL) 300 MG tablet Take 1 tablet by mouth every evening.    VRAYLAR 6 mg Cap Take 6 mg by mouth.    [DISCONTINUED] sildenafiL (VIAGRA) 100 MG tablet TAKE 1 TABLET BY MOUTH AS DIRECTED 1 HOUR SEXUAL ACTIVITY AS NEEDED    [DISCONTINUED] sulfamethoxazole-trimethoprim 800-160mg (BACTRIM  DS) 800-160 mg Tab Take 1 tablet by mouth 2 (two) times daily.     No current facility-administered medications on file prior to visit.       Review of Systems   Constitutional:  Positive for malaise/fatigue. Negative for chills, fever and weight loss.   HENT:  Negative for hearing loss.    Eyes:  Negative for blurred vision, double vision and photophobia.   Respiratory:  Negative for cough.    Cardiovascular:  Negative for chest pain, palpitations and orthopnea.   Gastrointestinal:  Negative for heartburn.   Genitourinary:  Negative for dysuria.   Musculoskeletal:  Negative for myalgias.   Skin:  Negative for rash.   Neurological:  Positive for tremors and weakness. Negative for dizziness, seizures and headaches.   Psychiatric/Behavioral:  Negative for depression and suicidal ideas.        PAST HISTORY:     Past Medical History:   Diagnosis Date    Bipolar disorder, unspecified     CLARE (generalized anxiety disorder)     Testicular hypofunction        Past Surgical History:   Procedure Laterality Date    FOOT SURGERY Left     INCISION AND DRAINAGE, UPPER EXTREMITY Left 12/28/2022    Procedure: INCISION AND DRAINAGE, UPPER EXTREMITY;  Surgeon: Levon Gardner MD;  Location: [facility];  Service: General;  Laterality: Left;  Left Elbow, have black arm table    IRRIGATION AND DEBRIDEMENT OF UPPER EXTREMITY  12/30/2022    Procedure: IRRIGATION AND DEBRIDEMENT, UPPER EXTREMITY;  Surgeon: Levon Gardner MD;  Location: [facility];  Service: General;;    WRIST SURGERY         History reviewed. No pertinent family history.    Social History     Socioeconomic History    Marital status:    Tobacco Use    Smoking status: Every Day     Types: Vaping with nicotine     Passive exposure: Never    Smokeless tobacco: Never   Substance and Sexual Activity    Alcohol use: Not Currently     Comment: 2yrs sober    Drug use: Yes     Types: Marijuana    Sexual activity: Yes     Social Determinants of Health     Financial Resource  "Strain: Low Risk     Difficulty of Paying Living Expenses: Not hard at all   Food Insecurity: No Food Insecurity    Worried About Running Out of Food in the Last Year: Never true    Ran Out of Food in the Last Year: Never true   Transportation Needs: No Transportation Needs    Lack of Transportation (Medical): No    Lack of Transportation (Non-Medical): No   Physical Activity: Insufficiently Active    Days of Exercise per Week: 1 day    Minutes of Exercise per Session: 30 min   Stress: No Stress Concern Present    Feeling of Stress : Not at all   Social Connections: Socially Isolated    Frequency of Communication with Friends and Family: More than three times a week    Frequency of Social Gatherings with Friends and Family: More than three times a week    Attends [employer] Services: Never    Active Member of Clubs or Organizations: No    Attends Club or Organization Meetings: Never    Marital Status:    Housing Stability: Low Risk     Unable to Pay for Housing in the Last Year: No    Number of Places Lived in the Last Year: 1    Unstable Housing in the Last Year: No       Review of patient's allergies indicates:   Allergen Reactions    Abilify [aripiprazole]     Prednisone     Keflex [cephalexin] Rash       OBJECTIVE:   Vital Signs:  Vitals:    04/27/23 0754   BP: 117/89   Pulse: 104   Resp: 18   Temp: 98.2 °F (36.8 °C)   TempSrc: Oral   SpO2: 97%   Weight: 101.6 kg (224 lb)   Height: 5' 10" (1.778 m)       No results found for this or any previous visit (from the past 24 hour(s)).      Physical Exam:  General:  Awake alert, good muscle tone/bulk, conversational   HEENT:  EOM full, moist mucosa, no congestion   Neck:  No JVD, supple, no lymphadenopathy   Cardiac:  Distinct S1 and S2, no murmur, regular rate and rhythm   Pulmonary:  CTAB, no rales, no wheeze   Abdomen:  Flat, soft, nontender   Neurologic:  Alert awake and oriented x3, nonfocal, no evidence of akathisia, tremors at this time, gait assessed on " observation good arm swing, good stride lung, good turns, steady  Psychiatric:  Somewhat flat, euthymic    Laboratory  CMP:   Recent Labs   Lab 09/03/21 0925 09/21/21  0924 10/05/21  0748 11/15/21  0707 11/25/22  0752 12/28/22  0355 12/29/22  0336 12/30/22  0356   Sodium Level 139   < > 140 142   < > 138 141 140   Potassium Level 4.1   < > 3.9 4.0   < > 3.8 4.3 3.4 L   Chloride 104   < > 104 106   < > 106 109 H 107   Carbon Dioxide 25   < > 24 27   < > 25 25 25   Blood Urea Nitrogen 8.8 L   < > 9.0 8.9   < > 9.0 12.8 13.1   Creatinine 1.03   < > 1.04 0.91   < > 0.75 0.67 L 0.76   Glucose Level 96   < > 111 H 115 H   < > 94 120 H 106 H   Calcium Level Total 9.0   < > 9.2 9.3   < > 8.5 8.7 8.3 L   Albumin Level 3.8  --  4.1 3.9   < > 2.3 L 2.3 L 2.4 L   Bilirubin Total 0.6  --  0.5 0.4   < > 0.4 0.2 0.2   Bilirubin Direct 0.2  --  0.2 0.2  --   --   --   --    Bilirubin Indirect 0.40  --  0.30 0.20  --   --   --   --    Aspartate Aminotransferase 17  --  15 14   < > 9 8 17   Alanine Aminotransferase 16  --  18 16   < > 10 10 11   Alkaline Phosphatase 60  --  59 58   < > 61 65 60    < > = values in this interval not displayed.     CBC:   Recent Labs   Lab 12/28/22 0355 12/29/22 0336 12/30/22 0356   WBC 7.5 6.1 5.0   Neut # 5.92 4.99 2.96   RBC 4.30 L 4.02 L 3.78 L   Hgb 13.3 L 12.3 L 11.4 L   Hct 37.2 L 35.3 L 33.5 L   Platelet 124 L 146 175   MCV 86.5 87.8 88.6   RDW 11.8 11.8 11.9     FLP:   Recent Labs   Lab 09/03/21  0925 11/15/21  0707 11/25/22  0752   Cholesterol Total 209 H 192 216 H   HDL Cholesterol 40 41 52   LDL Cholesterol 157.00 H 133.00 149.00 H   Triglyceride 60 92 76     DM:   Recent Labs   Lab 11/15/21  0707 11/25/22  0752 12/27/22  0717 12/28/22  0355 12/29/22  0336 12/30/22  0356   Hemoglobin A1c 5.3 5.1  --   --   --   --    Creatinine 0.91 0.93   < > 0.75 0.67 L 0.76    < > = values in this interval not displayed.     Thyroid:   Recent Labs   Lab 07/06/21  0955 11/15/21  0707 11/25/22  0752    Thyroid Stimulating Hormone 1.5054 3.2536 1.1618   Thyroxine Free  --   --  0.69 L     Anemia:   Recent Labs   Lab 12/30/22  0356   Hgb 11.4 L   Hct 33.5 L             ASSESSMENT & PLAN:   Hx of Left elbow abscess/cellulitis  - has had wound care follow up, see note with picture of progress 1/25, was discharged   -inpatient cultures were positive for MRSA, that was sensitive to tetracycline and vancomycin.  Transitioned to oral doxycycline on outpatient and patient completed course  -seeing a dermatologist, who prescribed him TMP/SMX to complete for 4 more days.  Etiology may be tattoo related.    History of chronic wound infections, consideration for primary immunodeficiency  - workup on inpatient showed low IgG, low IgA but normal IgM  - referral was made to allergologist, immunologist  - referred again today, asked patient to call    Bipolar D/O   Episodic Generalized Tremulousness  Akithisia  - On exam today no evidence of tardive, parkinsonian movements  - On multiple psychiatric medications, defer to Psychiatry in management    Hx of Low Testosterone  - Will get level today    RTC 3 months with labs today  Future Appointments     No future appointments.     Orders Placed This Encounter   Procedures    (In Office Administered) Pneumococcal Conjugate Vaccine (20 Valent) (IM)    Hepatitis Panel, Acute     Standing Status:   Future     Standing Expiration Date:   6/25/2024    Hemoglobin A1C     Standing Status:   Future     Standing Expiration Date:   6/25/2024    Comprehensive Metabolic Panel     Standing Status:   Future     Standing Expiration Date:   6/25/2024    CBC Auto Differential     Standing Status:   Future     Standing Expiration Date:   6/25/2024    Lipid Panel     Standing Status:   Future     Standing Expiration Date:   6/25/2024    TSH     Standing Status:   Future     Standing Expiration Date:   6/25/2024    Vitamin D     Standing Status:   Future     Standing Expiration Date:   6/25/2024     Vitamin B12     Standing Status:   Future     Standing Expiration Date:   6/25/2024    Folate     Standing Status:   Future     Standing Expiration Date:   6/25/2024    Testosterone     Standing Status:   Future     Standing Expiration Date:   6/25/2024             Dimitry was seen today for follow-up.    Diagnoses and all orders for this visit:    Establishing care with new doctor, encounter for  -     Cancel: HIV 1/2 Ag/Ab (4th Gen); Future  -     Hepatitis Panel, Acute; Future  -     Hemoglobin A1C; Future  -     Comprehensive Metabolic Panel; Future  -     CBC Auto Differential; Future  -     Lipid Panel; Future  -     TSH; Future  -     Vitamin D; Future  -     Vitamin B12; Future  -     Folate; Future    History of recurrent infection  -     Cancel: HIV 1/2 Ag/Ab (4th Gen); Future  -     Hepatitis Panel, Acute; Future  -     Hemoglobin A1C; Future  -     Comprehensive Metabolic Panel; Future  -     CBC Auto Differential; Future  -     Lipid Panel; Future  -     TSH; Future  -     Vitamin D; Future  -     Vitamin B12; Future  -     Folate; Future    Testosterone deficiency  -     Testosterone; Future    Other orders  -     (In Office Administered) Pneumococcal Conjugate Vaccine (20 Valent) (IM)  -     sildenafiL (VIAGRA) 100 MG tablet; TAKE 1 TABLET BY MOUTH AS DIRECTED 1 HOUR SEXUAL ACTIVITY AS NEEDED        Health Maintenance Due   Topic Date Due    Pneumococcal Vaccines (Age 0-64) (1 - PCV) Never done           Health Maintenance/ Wellness  Pneumococcal vaccine: Given today  TDAP: UTD  Influenza vaccine: n/a  Shingrix vaccine: n/a  AAA U/S screening: due 66 yo  Screening colonoscopy: due 44 yo  Lung Cancer Screening: due 56 yo  Hepatitis Panel: ordered  HIV - normal 2022    Future Appointments     No future appointments.   Orders Placed This Encounter   Procedures    (In Office Administered) Pneumococcal Conjugate Vaccine (20 Valent) (IM)    Hepatitis Panel, Acute     Standing Status:   Future     Standing  Expiration Date:   6/25/2024    Hemoglobin A1C     Standing Status:   Future     Standing Expiration Date:   6/25/2024    Comprehensive Metabolic Panel     Standing Status:   Future     Standing Expiration Date:   6/25/2024    CBC Auto Differential     Standing Status:   Future     Standing Expiration Date:   6/25/2024    Lipid Panel     Standing Status:   Future     Standing Expiration Date:   6/25/2024    TSH     Standing Status:   Future     Standing Expiration Date:   6/25/2024    Vitamin D     Standing Status:   Future     Standing Expiration Date:   6/25/2024    Vitamin B12     Standing Status:   Future     Standing Expiration Date:   6/25/2024    Folate     Standing Status:   Future     Standing Expiration Date:   6/25/2024    Testosterone     Standing Status:   Future     Standing Expiration Date:   6/25/2024         Discussed with Dr. Sarabia  - Staff Attestation to Follow    Jersey Lundberg MD  [facility] Internal Medicine PGY-1

## 2023-04-27 NOTE — PROGRESS NOTES
I have reviewed and concur with the resident's history, physical, assessment, and plan.  I have discussed with him all issues related to the diagnosis, workup and treatment plan. Care provided as reasonable and necessary.MRSA  resolved    Silverio Sarabia MD  [facility]

## 2023-04-28 ENCOUNTER — TELEPHONE (OUTPATIENT)
Dept: INTERNAL MEDICINE | Facility: CLINIC | Age: 43
End: 2023-04-28
Payer: MEDICAID

## 2023-04-28 LAB — PATH REV: NORMAL

## 2023-04-28 NOTE — TELEPHONE ENCOUNTER
Contacted patient ID and  verified. Patient informed of normal lab results. Patient verbalized complete understanding.

## 2023-06-21 ENCOUNTER — PATIENT MESSAGE (OUTPATIENT)
Dept: ADMINISTRATIVE | Facility: HOSPITAL | Age: 43
End: 2023-06-21
Payer: MEDICAID

## 2023-07-17 ENCOUNTER — OFFICE VISIT (OUTPATIENT)
Dept: INTERNAL MEDICINE | Facility: CLINIC | Age: 43
End: 2023-07-17
Payer: MEDICAID

## 2023-07-17 VITALS
OXYGEN SATURATION: 98 % | DIASTOLIC BLOOD PRESSURE: 69 MMHG | TEMPERATURE: 98 F | HEIGHT: 70 IN | SYSTOLIC BLOOD PRESSURE: 92 MMHG | WEIGHT: 224 LBS | BODY MASS INDEX: 32.07 KG/M2 | HEART RATE: 77 BPM | RESPIRATION RATE: 16 BRPM

## 2023-07-17 DIAGNOSIS — D84.9 IMMUNODEFICIENCY DISEASE: ICD-10-CM

## 2023-07-17 DIAGNOSIS — Z12.12 ENCOUNTER FOR COLORECTAL CANCER SCREENING: ICD-10-CM

## 2023-07-17 DIAGNOSIS — Z12.11 ENCOUNTER FOR COLORECTAL CANCER SCREENING: ICD-10-CM

## 2023-07-17 DIAGNOSIS — E34.9 TESTOSTERONE DEFICIENCY: Primary | ICD-10-CM

## 2023-07-17 PROCEDURE — 99215 OFFICE O/P EST HI 40 MIN: CPT | Mod: PBBFAC

## 2023-07-17 RX ORDER — DEXTROAMPHETAMINE SACCHARATE, AMPHETAMINE ASPARTATE, DEXTROAMPHETAMINE SULFATE AND AMPHETAMINE SULFATE 7.5; 7.5; 7.5; 7.5 MG/1; MG/1; MG/1; MG/1
TABLET ORAL
COMMUNITY
Start: 2023-06-12

## 2023-07-17 RX ORDER — FLUVOXAMINE MALEATE 150 MG/1
150 CAPSULE, EXTENDED RELEASE ORAL
COMMUNITY
Start: 2023-06-27

## 2023-07-17 RX ORDER — ROPINIROLE 1 MG/1
1 TABLET, FILM COATED ORAL NIGHTLY
COMMUNITY
Start: 2023-06-27

## 2023-07-17 NOTE — PROGRESS NOTES
[facility]IDENT [facility]  CLINIC NOTE    Patient Name: Dimitry Lee  YOB: 1980    PRESENTING HISTORY       History of Present Illness:  Dimitry Lee is a 42 y.o. male with a history of bipolar & general anxiety disorder who presents to establish care.    Per D/C summary: He presented to the ED on 12/27/2022 with a primary complaint of L elbow abscess and cellulitis. Pt reports waking up Saturday 12/24/22 with a blister on his elbow which popped resulting in an abscess. Has several tattoos on his L arm with the most recent one being 2 weeks ago resulting in small blisters for which he was on bactrim for 3 weeks at time for about 3 months. Also reports having the flu last week and had lost about 10 pnds since then. Had one episode of diarrhea yesterday associated with nausea and fever of 103. He is allergic to keflex ( rash reactions). Denies IV drug use but admits to [facility]. UDS positive for amphetamines, Cannabinoids, phencyclidine.  He reports decreased ROM of the L arm with pain rated 9/10. Denies SOB, chest pain, abdominal pain, vomiting.  Worked up for immunodeficiencies inpatient, as well as given antibiotics after speciation of MRSA showed sensitivity to both vancomycin and doxycycline.  Doxycycline was given on outpatient and patient has followed up with wound care since then.  Last visit when wound care was 1/25 showing improvement.    Past Visit:  Patient is here to establish care.  Three main points tackled today.  First discussed previous medical history including low testosterone, as well as bipolar disorder currently treated on multiple medications began last June (2022).  He states that he is doing well on this regimen, mood doing well.  He does endorse episodes of tremulousness, lasting 2-45 seconds generalized that forced him to have to hold onto objects though he does not lose muscle tone during this time.  Nor does he lose consciousness, he was able to call  for help.  Additionally he does endorse fidgety movements, noted he is on ropinirole.  He also endorses that he has previously been treated with testosterone, we will get levels today.  He is amenable to pneumonia vaccination today.  2nd point would be getting laboratories for follow up, baseline screenings.  Third point would be to follow up with immunology and we will clarify referral today.    Today: Patient is doing well. Looks a little flat today. Wants to get treated for Low T, however labs were wnl last visit. Manifestation of erectile dysfunction is maintaining tumescence vs. Initiation. Viagra works for him. Otherwise he states that his levels were treated to 700s with previous doctor. Will repeat labs today.     CURRENT MEDICATIONS      Current Outpatient Medications on File Prior to Visit   Medication Sig    busPIRone (BUSPAR) 15 MG tablet Take 15 mg by mouth.    dextroamphetamine-amphetamine 10 mg Tab     fluvoxaMINE (LUVOX) 100 MG tablet Take 150 mg by mouth.    guanFACINE (TENEX) 1 MG Tab Take 1 tablet by mouth every evening.    mirtazapine (REMERON) 45 MG tablet Take 1 tablet by mouth every evening.    OXcarbazepine (TRILEPTAL) 600 MG Tab Take 600 mg by mouth.    rOPINIRole (REQUIP) 2 MG tablet Take 2 mg by mouth.    sildenafiL (VIAGRA) 100 MG tablet TAKE 1 TABLET BY MOUTH AS DIRECTED 1 HOUR SEXUAL ACTIVITY AS NEEDED    traZODone (DESYREL) 300 MG tablet Take 1 tablet by mouth every evening.    VRAYLAR 6 mg Cap Take 6 mg by mouth.    acetaminophen (TYLENOL) 325 MG tablet Take 2 tablets (650 mg total) by mouth every 8 (eight) hours as needed for Pain (or equal to 101 degree F).    dextroamphetamine-amphetamine 30 mg Tab SMARTSI Tablet(s) By Mouth Morning-Evening    fluvoxaMINE 150 mg 24 hr capsule Take 150 mg by mouth.    mupirocin (BACTROBAN) 2 % ointment APPLY A SMALL AMOUNT OINTMENT TOPICALLY TO INNER NOSE TWICE DAILY    rOPINIRole (REQUIP) 1 MG tablet Take 1 mg by mouth every evening.     No  current facility-administered medications on file prior to visit.       Review of Systems   Constitutional:  Positive for malaise/fatigue. Negative for chills, fever and weight loss.   HENT:  Negative for hearing loss.    Eyes:  Negative for blurred vision, double vision and photophobia.   Respiratory:  Negative for cough.    Cardiovascular:  Negative for chest pain, palpitations and orthopnea.   Gastrointestinal:  Negative for heartburn.   Genitourinary:  Negative for dysuria.   Musculoskeletal:  Negative for myalgias.   Skin:  Negative for rash.   Neurological:  Positive for tremors and weakness. Negative for dizziness, seizures and headaches.   Psychiatric/Behavioral:  Negative for depression and suicidal ideas.        PAST HISTORY:     Past Medical History:   Diagnosis Date    Bipolar disorder, unspecified     CLARE (generalized anxiety disorder)     Testicular hypofunction        Past Surgical History:   Procedure Laterality Date    FOOT SURGERY Left     INCISION AND DRAINAGE, UPPER EXTREMITY Left 12/28/2022    Procedure: INCISION AND DRAINAGE, UPPER EXTREMITY;  Surgeon: Levon Gardner MD;  Location: [facility];  Service: General;  Laterality: Left;  Left Elbow, have black arm table    IRRIGATION AND DEBRIDEMENT OF UPPER EXTREMITY  12/30/2022    Procedure: IRRIGATION AND DEBRIDEMENT, UPPER EXTREMITY;  Surgeon: Levon Gardner MD;  Location: [facility];  Service: General;;    WRIST SURGERY         History reviewed. No pertinent family history.    Social History     Socioeconomic History    Marital status:    Tobacco Use    Smoking status: Every Day     Types: Vaping with nicotine     Passive exposure: Never    Smokeless tobacco: Never   Substance and Sexual Activity    Alcohol use: Not Currently     Comment: 2yrs sober    Drug use: Yes     Types: Marijuana    Sexual activity: Yes     Social Determinants of Health     Financial Resource Strain: Low Risk     Difficulty of Paying Living Expenses: Not hard at  "all   Food Insecurity: No Food Insecurity    Worried About Running Out of Food in the Last Year: Never true    Ran Out of Food in the Last Year: Never true   Transportation Needs: No Transportation Needs    Lack of Transportation (Medical): No    Lack of Transportation (Non-Medical): No   Physical Activity: Insufficiently Active    Days of Exercise per Week: 1 day    Minutes of Exercise per Session: 30 min   Stress: No Stress Concern Present    Feeling of Stress : Not at all   Social Connections: Socially Isolated    Frequency of Communication with Friends and Family: More than three times a week    Frequency of Social Gatherings with Friends and Family: More than three times a week    Attends [employer] Services: Never    Active Member of Clubs or Organizations: No    Attends Club or Organization Meetings: Never    Marital Status:    Housing Stability: Low Risk     Unable to Pay for Housing in the Last Year: No    Number of Places Lived in the Last Year: 1    Unstable Housing in the Last Year: No       Review of patient's allergies indicates:   Allergen Reactions    Abilify [aripiprazole]     Prednisone     Keflex [cephalexin] Rash       OBJECTIVE:   Vital Signs:  Vitals:    07/17/23 0804   BP: 92/69   Pulse: 77   Resp: 16   Temp: 98.2 °F (36.8 °C)   SpO2: 98%   Weight: 101.6 kg (224 lb)   Height: 5' 10" (1.778 m)         No results found for this or any previous visit (from the past 24 hour(s)).      Physical Exam:  General:  Awake alert, good muscle tone/bulk, conversational   HEENT:  EOM full, moist mucosa, no congestion   Neck:  No JVD, supple, no lymphadenopathy   Cardiac:  Distinct S1 and S2, no murmur, regular rate and rhythm   Pulmonary:  CTAB, no rales, no wheeze   Abdomen:  Flat, soft, nontender   Neurologic:  Alert awake and oriented x3, nonfocal, no evidence of akathisia, tremors at this time, gait assessed on observation good arm swing, good stride lung, good turns, steady  Psychiatric:  " Somewhat flat, euthymic    Laboratory  CMP:   Recent Labs   Lab 09/03/21  0925 09/21/21  0924 10/05/21  0748 11/15/21  0707 11/25/22  0752 12/29/22  0336 12/30/22  0356 04/27/23  0908   Sodium Level 139   < > 140 142   < > 141 140 139   Potassium Level 4.1   < > 3.9 4.0   < > 4.3 3.4 L 4.1   Chloride 104   < > 104 106   < > 109 H 107 104   Carbon Dioxide 25   < > 24 27   < > 25 25 26   Blood Urea Nitrogen 8.8 L   < > 9.0 8.9   < > 12.8 13.1 12.6   Creatinine 1.03   < > 1.04 0.91   < > 0.67 L 0.76 1.05   Glucose Level 96   < > 111 H 115 H   < > 120 H 106 H 122 H   Calcium Level Total 9.0   < > 9.2 9.3   < > 8.7 8.3 L 9.6   Albumin Level 3.8  --  4.1 3.9   < > 2.3 L 2.4 L 4.5   Bilirubin Total 0.6  --  0.5 0.4   < > 0.2 0.2 0.4   Bilirubin Direct 0.2  --  0.2 0.2  --   --   --   --    Bilirubin Indirect 0.40  --  0.30 0.20  --   --   --   --    Aspartate Aminotransferase 17  --  15 14   < > 8 17 14   Alanine Aminotransferase 16  --  18 16   < > 10 11 15   Alkaline Phosphatase 60  --  59 58   < > 65 60 74    < > = values in this interval not displayed.     CBC:   Recent Labs   Lab 12/29/22 0336 12/30/22 0356 04/27/23  0908   WBC 6.1 5.0 7.9   Neut # 4.99 2.96 5.60   RBC 4.02 L 3.78 L 5.59   Hgb 12.3 L 11.4 L 17.3   Hct 35.3 L 33.5 L 48.0   Platelet 146 175 209   MCV 87.8 88.6 85.9   RDW 11.8 11.9 12.7     FLP:   Recent Labs   Lab 11/15/21  0707 11/25/22 0752 04/27/23  0908   Cholesterol Total 192 216 H 239 H   HDL Cholesterol 41 52 49   LDL Cholesterol 133.00 149.00 H 174.00 H   Triglyceride 92 76 82     DM:   Recent Labs   Lab 11/15/21  0707 11/25/22  0752 12/27/22  0717 12/29/22  0336 12/30/22  0356 04/27/23  0908   Hemoglobin A1c 5.3 5.1  --   --   --  5.2   Creatinine 0.91 0.93   < > 0.67 L 0.76 1.05    < > = values in this interval not displayed.     Thyroid:   Recent Labs   Lab 11/15/21  0707 11/25/22  0752 04/27/23  0908   Thyroid Stimulating Hormone 3.2536 1.1618 1.161   Thyroxine Free  --  0.69 L  --       Anemia:   Recent Labs   Lab 04/27/23  0908   Hgb 17.3   Hct 48.0   Vitamin B12 Level 564   Folate Level 11.0             ASSESSMENT & PLAN:   Hx of Left elbow abscess/cellulitis - improved  - has had wound care follow up, see note with picture of progress 1/25, was discharged   -inpatient cultures were positive for MRSA, that was sensitive to tetracycline and vancomycin.  Transitioned to oral doxycycline on outpatient and patient completed course  -seeing a dermatologist, who prescribed him TMP/SMX to complete for 4 more days.  Etiology may be tattoo related.    History of chronic wound infections, consideration for primary immunodeficiency  - workup on inpatient showed low IgG, low IgA but normal IgM  - referral was made to allergologist, immunologist  - referred again today, asked patient to call    Bipolar D/O   Episodic Generalized Tremulousness  Akithisia  - On exam today no evidence of tardive, parkinsonian movements  - On multiple psychiatric medications, defer to Psychiatry in management    HLD  -LDL - 174 4/2023  -ASCVD 10 year risk is 4.2 % no statin indication at this time repeat in 6 months    Hx of Low Testosterone  - wnl 4/2023  - will reorder Testosterone with Free Dialysis and Total, LH, FSH  - Referral to Urology and Endocrinology  - Normal TSH    RTC 6 months  Future Appointments     No future appointments.     Orders Placed This Encounter   Procedures    Cologuard Screening (Multitarget Stool DNA)     Standing Status:   Future     Number of Occurrences:   1     Standing Expiration Date:   9/14/2024             Dimitry was seen today for follow-up.    Diagnoses and all orders for this visit:    Encounter for colorectal cancer screening  -     Cologuard Screening (Multitarget Stool DNA); Future  -     Cologuard Screening (Multitarget Stool DNA)          Health Maintenance Due   Topic Date Due    Colorectal Cancer Screening  Never done           Health Maintenance/ Wellness  Pneumococcal vaccine:  UTD  TDAP: UTD  Influenza vaccine: n/a  Shingrix vaccine: n/a  AAA U/S screening: due 66 yo  Screening colonoscopy: Cologuard ordered  Lung Cancer Screening: due 56 yo  Hepatitis Panel: normal 4/2023  HIV - normal 2022    RTC 6 months    Future Appointments     No future appointments.   Orders Placed This Encounter   Procedures    Cologuard Screening (Multitarget Stool DNA)     Standing Status:   Future     Number of Occurrences:   1     Standing Expiration Date:   9/14/2024         Discussed with Dr. Marcelo  - Staff Attestation to Follow    Jersey Lundberg MD  [facility] PGY-1

## 2023-11-13 ENCOUNTER — PATIENT MESSAGE (OUTPATIENT)
Dept: INTERNAL MEDICINE | Facility: CLINIC | Age: 43
End: 2023-11-13
Payer: MEDICAID

## 2024-01-05 ENCOUNTER — TELEPHONE (OUTPATIENT)
Dept: INTERNAL MEDICINE | Facility: CLINIC | Age: 44
End: 2024-01-05

## 2024-02-29 ENCOUNTER — LAB VISIT (OUTPATIENT)
Dept: LAB | Facility: HOSPITAL | Age: 44
End: 2024-02-29
Payer: MEDICAID

## 2024-02-29 DIAGNOSIS — E34.9 TESTOSTERONE DEFICIENCY: ICD-10-CM

## 2024-02-29 LAB
FSH SERPL-ACNC: 6.2 MIU/ML
LH SERPL-ACNC: 3.97 MIU/ML

## 2024-02-29 PROCEDURE — 36415 COLL VENOUS BLD VENIPUNCTURE: CPT

## 2024-02-29 PROCEDURE — 83002 ASSAY OF GONADOTROPIN (LH): CPT

## 2024-02-29 PROCEDURE — 84403 ASSAY OF TOTAL TESTOSTERONE: CPT

## 2024-02-29 PROCEDURE — 83001 ASSAY OF GONADOTROPIN (FSH): CPT

## 2024-03-06 LAB
TESTOST FREE SERPL-MCNC: 9.74 NG/DL (ref 4.26–16.4)
TESTOST SERPL-MCNC: 341 NG/DL (ref 240–950)

## 2024-05-23 RX ORDER — SILDENAFIL 100 MG/1
TABLET, FILM COATED ORAL
Qty: 30 TABLET | Refills: 3 | Status: SHIPPED | OUTPATIENT
Start: 2024-05-23

## 2024-06-24 ENCOUNTER — TELEPHONE (OUTPATIENT)
Dept: INTERNAL MEDICINE | Facility: CLINIC | Age: 44
End: 2024-06-24
Payer: MEDICAID

## 2024-08-14 ENCOUNTER — OFFICE VISIT (OUTPATIENT)
Dept: INTERNAL MEDICINE | Facility: CLINIC | Age: 44
End: 2024-08-14
Payer: MEDICAID

## 2024-08-14 VITALS
SYSTOLIC BLOOD PRESSURE: 115 MMHG | OXYGEN SATURATION: 98 % | WEIGHT: 259.63 LBS | RESPIRATION RATE: 18 BRPM | DIASTOLIC BLOOD PRESSURE: 75 MMHG | HEIGHT: 70 IN | HEART RATE: 92 BPM | BODY MASS INDEX: 37.17 KG/M2 | TEMPERATURE: 98 F

## 2024-08-14 DIAGNOSIS — Z12.11 ENCOUNTER FOR COLORECTAL CANCER SCREENING: Primary | ICD-10-CM

## 2024-08-14 DIAGNOSIS — E34.9 TESTOSTERONE DEFICIENCY: ICD-10-CM

## 2024-08-14 DIAGNOSIS — Z12.12 ENCOUNTER FOR COLORECTAL CANCER SCREENING: Primary | ICD-10-CM

## 2024-08-14 PROCEDURE — 99215 OFFICE O/P EST HI 40 MIN: CPT | Mod: PBBFAC

## 2024-08-14 RX ORDER — SEMAGLUTIDE 1.34 MG/ML
0.5 INJECTION, SOLUTION SUBCUTANEOUS
Qty: 1.5 ML | Refills: 11 | Status: SHIPPED | OUTPATIENT
Start: 2024-08-14 | End: 2025-08-14

## 2024-08-14 RX ORDER — ONDANSETRON 4 MG/1
4 TABLET, FILM COATED ORAL EVERY 6 HOURS PRN
COMMUNITY

## 2024-08-14 RX ORDER — CLONAZEPAM 0.5 MG/1
0.5 TABLET ORAL DAILY PRN
COMMUNITY

## 2024-08-14 NOTE — PROGRESS NOTES
INTERNAL MEDICINE RESIDENT CLINIC  CLINIC NOTE    Patient Name: Dimitry Lee  YOB: 1980    PRESENTING HISTORY       History of Present Illness:  Dimitry Lee is a 42 y.o. male with a history of bipolar & general anxiety disorder who presents to establish care.    Per D/C summary: He presented to the ED on 12/27/2022 with a primary complaint of L elbow abscess and cellulitis. Pt reports waking up Saturday 12/24/22 with a blister on his elbow which popped resulting in an abscess. Has several tattoos on his L arm with the most recent one being 2 weeks ago resulting in small blisters for which he was on bactrim for 3 weeks at time for about 3 months. Also reports having the flu last week and had lost about 10 pnds since then. Had one episode of diarrhea yesterday associated with nausea and fever of 103. He is allergic to keflex ( rash reactions). Denies IV drug use but admits to [facility]. UDS positive for amphetamines, Cannabinoids, phencyclidine.  He reports decreased ROM of the L arm with pain rated 9/10. Denies SOB, chest pain, abdominal pain, vomiting.  Worked up for immunodeficiencies inpatient, as well as given antibiotics after speciation of MRSA showed sensitivity to both vancomycin and doxycycline.  Doxycycline was given on outpatient and patient has followed up with wound care since then.  Last visit when wound care was 1/25 showing improvement.    Past Visit:  Patient is here to establish care.  Three main points tackled today.  First discussed previous medical history including low testosterone, as well as bipolar disorder currently treated on multiple medications began last June (2022).  He states that he is doing well on this regimen, mood doing well.  He does endorse episodes of tremulousness, lasting 2-45 seconds generalized that forced him to have to hold onto objects though he does not lose muscle tone during this time.  Nor does he lose consciousness, he was able to call  for help.  Additionally he does endorse fidgety movements, noted he is on ropinirole.  He also endorses that he has previously been treated with testosterone, we will get levels today.  He is amenable to pneumonia vaccination today.  2nd point would be getting laboratories for follow up, baseline screenings.  Third point would be to follow up with immunology and we will clarify referral today.    Patient is doing well. Looks a little flat today. Wants to get treated for Low T, however labs were wnl last visit. Manifestation of erectile dysfunction is maintaining tumescence vs. Initiation. Viagra works for him. Otherwise he states that his levels were treated to 700s with previous doctor. Will repeat labs today.     2024: Patient states he has what sounds like RLS symptoms at night in UE as well. He wants to lose weight and get back on testosterone. Otherwise he wants colon screening.    CURRENT MEDICATIONS      Current Outpatient Medications on File Prior to Visit   Medication Sig    busPIRone (BUSPAR) 15 MG tablet Take 15 mg by mouth.    dextroamphetamine-amphetamine 30 mg Tab SMARTSI Tablet(s) By Mouth Morning-Evening    mirtazapine (REMERON) 45 MG tablet Take 1 tablet by mouth every evening.    OXcarbazepine (TRILEPTAL) 600 MG Tab Take 600 mg by mouth.    sildenafiL (VIAGRA) 100 MG tablet TAKE 1 TABLET BY MOUTH AS DIRECTED 1 HOUR SEXUAL ACTIVITY AS NEEDED    traZODone (DESYREL) 300 MG tablet Take 1 tablet by mouth every evening.    VRAYLAR 6 mg Cap Take 6 mg by mouth.    acetaminophen (TYLENOL) 325 MG tablet Take 2 tablets (650 mg total) by mouth every 8 (eight) hours as needed for Pain (or equal to 101 degree F). (Patient not taking: Reported on 2024)    clonazePAM (KLONOPIN) 0.5 MG tablet Take 0.5 mg by mouth daily as needed.    dextroamphetamine-amphetamine 10 mg Tab  (Patient not taking: Reported on 2024)    fluvoxaMINE (LUVOX) 100 MG tablet Take 150 mg by mouth.    fluvoxaMINE 150 mg 24 hr  capsule Take 150 mg by mouth.    guanFACINE (TENEX) 1 MG Tab Take 1 tablet by mouth every evening. (Patient not taking: Reported on 8/14/2024)    mupirocin (BACTROBAN) 2 % ointment APPLY A SMALL AMOUNT OINTMENT TOPICALLY TO INNER NOSE TWICE DAILY (Patient not taking: Reported on 8/14/2024)    ondansetron (ZOFRAN) 4 MG tablet Take 4 mg by mouth every 6 (six) hours as needed. TAKE 1 TABLET BY MOUTH EVERY 6 HOURS FOR 5 DAYS (Patient not taking: Reported on 8/14/2024)    rOPINIRole (REQUIP) 1 MG tablet Take 1 mg by mouth every evening.    rOPINIRole (REQUIP) 2 MG tablet Take 2 mg by mouth.     No current facility-administered medications on file prior to visit.       Review of Systems   Constitutional:  Positive for malaise/fatigue. Negative for chills, fever and weight loss.   HENT:  Negative for hearing loss.    Eyes:  Negative for blurred vision, double vision and photophobia.   Respiratory:  Negative for cough.    Cardiovascular:  Negative for chest pain, palpitations and orthopnea.   Gastrointestinal:  Negative for heartburn.   Genitourinary:  Negative for dysuria.   Musculoskeletal:  Negative for myalgias.   Skin:  Negative for rash.   Neurological:  Positive for tremors and weakness. Negative for dizziness, seizures and headaches.   Psychiatric/Behavioral:  Negative for depression and suicidal ideas.          PAST HISTORY:     Past Medical History:   Diagnosis Date    Bipolar disorder, unspecified     CLARE (generalized anxiety disorder)     Testicular hypofunction        Past Surgical History:   Procedure Laterality Date    FOOT SURGERY Left     INCISION AND DRAINAGE, UPPER EXTREMITY Left 12/28/2022    Procedure: INCISION AND DRAINAGE, UPPER EXTREMITY;  Surgeon: Levon Gardner MD;  Location: [facility];  Service: General;  Laterality: Left;  Left Elbow, have black arm table    IRRIGATION AND DEBRIDEMENT OF UPPER EXTREMITY  12/30/2022    Procedure: IRRIGATION AND DEBRIDEMENT, UPPER EXTREMITY;  Surgeon: Levon JAMESON  "MD Jj;  Location: [facility];  Service: General;;    WRIST SURGERY         No family history on file.    Social History     Socioeconomic History    Marital status:    Tobacco Use    Smoking status: Every Day     Types: Vaping with nicotine     Passive exposure: Never    Smokeless tobacco: Never   Substance and Sexual Activity    Alcohol use: Not Currently     Comment: 2yrs sober    Drug use: Yes     Types: Marijuana    Sexual activity: Yes     Social Determinants of Health     Financial Resource Strain: Low Risk  (12/29/2022)    Overall Financial Resource Strain (CARDIA)     Difficulty of Paying Living Expenses: Not hard at all   Food Insecurity: No Food Insecurity (12/29/2022)    Hunger Vital Sign     Worried About Running Out of Food in the Last Year: Never true     Ran Out of Food in the Last Year: Never true   Transportation Needs: No Transportation Needs (4/27/2023)    PRAPARE - Transportation     Lack of Transportation (Medical): No     Lack of Transportation (Non-Medical): No   Physical Activity: Insufficiently Active (12/29/2022)    Exercise Vital Sign     Days of Exercise per Week: 1 day     Minutes of Exercise per Session: 30 min   Stress: No Stress Concern Present (12/29/2022)    [employer] [place] [employer] - Occupational Stress Questionnaire     Feeling of Stress : Not at all   Housing Stability: Low Risk  (12/29/2022)    Housing Stability Vital Sign     Unable to Pay for Housing in the Last Year: No     Number of Places Lived in the Last Year: 1     Unstable Housing in the Last Year: No       Review of patient's allergies indicates:   Allergen Reactions    Abilify [aripiprazole]     Prednisone     Keflex [cephalexin] Rash       OBJECTIVE:   Vital Signs:  Vitals:    08/14/24 0822   BP: 115/75   Pulse: 92   Resp: 18   Temp: 97.9 °F (36.6 °C)   TempSrc: Oral   SpO2: 98%   Weight: 117.8 kg (259 lb 9.6 oz)   Height: 5' 10" (1.778 m)           No results found for this or any previous " visit (from the past 24 hour(s)).      Physical Exam:  General:  Awake alert, good muscle tone/bulk, conversational   HEENT:  EOM full, moist mucosa, no congestion   Neck:  No JVD, supple, no lymphadenopathy   Cardiac:  Distinct S1 and S2, no murmur, regular rate and rhythm   Pulmonary:  CTAB, no rales, no wheeze   Abdomen:  Flat, soft, nontender   Neurologic:  Alert awake and oriented x3, nonfocal, no evidence of akathisia, tremors at this time, gait assessed on observation good arm swing, good stride lung, good turns, steady  Psychiatric:  Somewhat flat, euthymic    Laboratory  CMP:   Recent Labs   Lab 09/03/21  0925 09/21/21  0924 10/05/21  0748 11/15/21  0707 11/25/22  0752 12/29/22  0336 12/30/22  0356 04/27/23  0908   Sodium 139   < > 140 142   < > 141 140 139   Potassium 4.1   < > 3.9 4.0   < > 4.3 3.4 L 4.1   CO2 25   < > 24 27   < > 25 25 26   Blood Urea Nitrogen 8.8 L   < > 9.0 8.9   < > 12.8 13.1 12.6   Creatinine 1.03   < > 1.04 0.91   < > 0.67 L 0.76 1.05   Glucose 96   < > 111 H 115 H   < > 120 H 106 H 122 H   Calcium 9.0   < > 9.2 9.3   < > 8.7 8.3 L 9.6   Albumin 3.8  --  4.1 3.9   < > 2.3 L 2.4 L 4.5   Bilirubin Total 0.6  --  0.5 0.4   < > 0.2 0.2 0.4   Bilirubin Direct 0.2  --  0.2 0.2  --   --   --   --    Bilirubin Indirect 0.40  --  0.30 0.20  --   --   --   --    AST 17  --  15 14   < > 8 17 14   ALT 16  --  18 16   < > 10 11 15   ALP 60  --  59 58   < > 65 60 74    < > = values in this interval not displayed.     CBC:   Recent Labs   Lab 12/29/22  0336 12/30/22  0356 04/27/23  0908   WBC 6.1 5.0 7.9   Neut # 4.99 2.96 5.60   RBC 4.02 L 3.78 L 5.59   Hgb 12.3 L 11.4 L 17.3   Hct 35.3 L 33.5 L 48.0   Platelet 146 175 209   MCV 87.8 88.6 85.9   RDW 11.8 11.9 12.7     FLP:   Recent Labs   Lab 11/15/21  0707 11/25/22  0752 04/27/23  0908   Cholesterol Total 192 216 H 239 H   HDL Cholesterol 41 52 49   LDL Cholesterol 133.00 149.00 H 174.00 H   Triglyceride 92 76 82     DM:   Recent Labs   Lab  11/15/21  0707 11/25/22  0752 12/27/22  0717 12/29/22  0336 12/30/22  0356 04/27/23  0908   Hemoglobin A1c 5.3 5.1  --   --   --  5.2   Creatinine 0.91 0.93   < > 0.67 L 0.76 1.05    < > = values in this interval not displayed.     Thyroid:   Recent Labs   Lab 11/15/21  0707 11/25/22  0752 04/27/23  0908   TSH 3.2536 1.1618 1.161   Thyroxine Free  --  0.69 L  --      Anemia:   Recent Labs   Lab 04/27/23  0908   Hgb 17.3   Hct 48.0   Vitamin B12 564   Folate Level 11.0             ASSESSMENT & PLAN:   Hx of Left elbow abscess/cellulitis - improved  - has had wound care follow up, see note with picture of progress 1/25, was discharged   -inpatient cultures were positive for MRSA, that was sensitive to tetracycline and vancomycin.  Transitioned to oral doxycycline on outpatient and patient completed course  -seeing a dermatologist, who prescribed him TMP/SMX to complete for 4 more days.  Etiology may be tattoo related.    History of chronic wound infections, consideration for primary immunodeficiency  - workup on inpatient showed low IgG, low IgA but normal IgM  - referral was made to allergologist, immunologist  - referred again today, asked patient to call    Bipolar D/O   Episodic Generalized Tremulousness  Akithisia  - On exam today no evidence of tardive, parkinsonian movements  - On multiple psychiatric medications, defer to Psychiatry in management    HLD  -LDL - 174 4/2023  -ASCVD 10 year risk is 4.2 % no statin indication at this time repeat in 6 months    Hx of Low Testosterone  - wnl 4/2023  - will reorder Testosterone with Free Dialysis and Total, LH, FSH - these are WNL  - Referral to Urology and Endocrinology - not able to get in  - Normal TSH    RTC 6 months, referral to colon screening    Future Appointments     No future appointments.     Orders Placed This Encounter   Procedures    Ambulatory referral/consult to Gastroenterology     Standing Status:   Future     Standing Expiration Date:   9/14/2025      Referral Priority:   Routine     Referral Type:   Consultation     Referral Reason:   Specialty Services Required     Referred to Provider:   Aris Cruz MD     Requested Specialty:   Gastroenterology     Number of Visits Requested:   1             Dimitry was seen today for health maintenance.    Diagnoses and all orders for this visit:    Encounter for colorectal cancer screening  -     Ambulatory referral/consult to Gastroenterology; Future    Testosterone deficiency    Other orders  -     semaglutide (OZEMPIC) 1 mg/dose (4 mg/3 mL); Inject 0.5 mg into the skin every 7 days.            Health Maintenance Due   Topic Date Due    COVID-19 Vaccine (3 - Mixed Product risk series) 01/05/2022    Colorectal Cancer Screening  Never done           Health Maintenance/ Wellness  Pneumococcal vaccine: UTD  TDAP: UTD  Influenza vaccine: n/a  Shingrix vaccine: n/a  AAA U/S screening: due 64 yo  Screening colonoscopy: Cologuard ordered, referral to GI ordered  Lung Cancer Screening: due 54 yo  Hepatitis Panel: normal 4/2023  HIV - normal 2022      Future Appointments     No future appointments.   Orders Placed This Encounter   Procedures    Ambulatory referral/consult to Gastroenterology     Standing Status:   Future     Standing Expiration Date:   9/14/2025     Referral Priority:   Routine     Referral Type:   Consultation     Referral Reason:   Specialty Services Required     Referred to Provider:   Aris Cruz MD     Requested Specialty:   Gastroenterology     Number of Visits Requested:   1         Discussed with Dr. Lawler - Staff Attestation to Follow    Jersey Lundberg MD  [facility] Internal Medicine PGY-3

## 2024-08-22 ENCOUNTER — TELEPHONE (OUTPATIENT)
Dept: INTERNAL MEDICINE | Facility: CLINIC | Age: 44
End: 2024-08-22
Payer: MEDICAID

## 2024-08-22 DIAGNOSIS — F39 MOOD DISORDER: Primary | ICD-10-CM

## 2024-08-22 DIAGNOSIS — F41.9 ANXIETY: ICD-10-CM

## 2024-08-22 NOTE — TELEPHONE ENCOUNTER
Emilia Jesus, patient's mother, called and patient's name and date of birth verified. Mrs. Lee states that her son needs to see a psychiatrist because the one he is seeing now is not helping him. Please advise.

## 2024-08-22 NOTE — TELEPHONE ENCOUNTER
Contacted patient ID and  verified by Emilia Lee, patient's mother. Patient's mother informed of pysch referral placed to Dr. Woodward and provided contact information also. Emilia Kimr, patient's mother verbalized complete understanding.

## 2024-09-13 ENCOUNTER — TELEPHONE (OUTPATIENT)
Dept: BEHAVIORAL HEALTH | Facility: CLINIC | Age: 44
End: 2024-09-13
Payer: MEDICAID

## 2024-09-13 NOTE — TELEPHONE ENCOUNTER
"Mother states she needs someone to talk to her son about getting a job and doing something with his life. She was under the impression that Dr. Woodward is a job counselor. I informed her that our providers are medication management and that we do not offer counseling/therapy services. She verbalized understanding and denied making appointment for the patient stating "there's enough medicine in [facility] closet for all of Dr. Woodward's patients."  Call then ended.  "

## 2025-01-02 DIAGNOSIS — N52.8 OTHER MALE ERECTILE DYSFUNCTION: Primary | ICD-10-CM

## 2025-01-02 RX ORDER — SILDENAFIL 100 MG/1
TABLET, FILM COATED ORAL
Qty: 30 TABLET | Refills: 3 | Status: SHIPPED | OUTPATIENT
Start: 2025-01-02

## 2025-01-02 RX ORDER — SILDENAFIL 100 MG/1
TABLET, FILM COATED ORAL
Qty: 30 TABLET | Refills: 3 | Status: SHIPPED | OUTPATIENT
Start: 2025-01-02 | End: 2025-01-02 | Stop reason: SDUPTHER

## 2025-02-20 ENCOUNTER — HOSPITAL ENCOUNTER (EMERGENCY)
Facility: HOSPITAL | Age: 45
Discharge: HOME | End: 2025-02-20
Attending: EMERGENCY MEDICINE
Payer: MEDICAID

## 2025-02-20 VITALS
HEART RATE: 90 BPM | DIASTOLIC BLOOD PRESSURE: 98 MMHG | WEIGHT: 250 LBS | TEMPERATURE: 98 F | HEIGHT: 70 IN | SYSTOLIC BLOOD PRESSURE: 142 MMHG | RESPIRATION RATE: 20 BRPM | BODY MASS INDEX: 35.79 KG/M2 | OXYGEN SATURATION: 95 %

## 2025-02-20 DIAGNOSIS — J06.9 VIRAL URI WITH COUGH: Primary | ICD-10-CM

## 2025-02-20 LAB
FLUAV AG UPPER RESP QL IA.RAPID: NOT DETECTED
FLUBV AG UPPER RESP QL IA.RAPID: NOT DETECTED
RSV A 5' UTR RNA NPH QL NAA+PROBE: NOT DETECTED
SARS-COV-2 RNA RESP QL NAA+PROBE: NOT DETECTED

## 2025-02-20 PROCEDURE — 25000003 PHARM REV CODE 250: Performed by: PHYSICIAN ASSISTANT

## 2025-02-20 PROCEDURE — 99283 EMERGENCY DEPT VISIT LOW MDM: CPT

## 2025-02-20 PROCEDURE — 0241U COVID/RSV/FLU A&B PCR: CPT | Performed by: EMERGENCY MEDICINE

## 2025-02-20 RX ORDER — IBUPROFEN 400 MG/1
800 TABLET ORAL
Status: COMPLETED | OUTPATIENT
Start: 2025-02-20 | End: 2025-02-20

## 2025-02-20 RX ORDER — PROMETHAZINE HYDROCHLORIDE AND DEXTROMETHORPHAN HYDROBROMIDE 6.25; 15 MG/5ML; MG/5ML
5 SYRUP ORAL EVERY 6 HOURS PRN
Qty: 118 ML | Refills: 0 | Status: SHIPPED | OUTPATIENT
Start: 2025-02-20 | End: 2025-02-27

## 2025-02-20 RX ORDER — CETIRIZINE HYDROCHLORIDE 10 MG/1
10 TABLET ORAL DAILY
Qty: 30 TABLET | Refills: 0 | Status: SHIPPED | OUTPATIENT
Start: 2025-02-20 | End: 2025-03-22

## 2025-02-20 RX ADMIN — IBUPROFEN 800 MG: 400 TABLET, FILM COATED ORAL at 05:02

## 2025-02-20 NOTE — Clinical Note
"Dimitry Kim (David)r was seen and treated in our emergency department on 2/20/2025.  He may return to work on 02/24/2025.       If you have any questions or concerns, please don't hesitate to call.      Marisela Stephens PA"

## 2025-02-21 NOTE — DISCHARGE INSTRUCTIONS
Hydrate with plenty of water. Tylenol and ibuprofen in rotation for fever/aches. Use allergy medication daily. May use over the counter cough syrup.  May take promethazine cough syrup as needed for severe cough.  Do not drink or drive while taking medication as can make you sleepy.  Follow up with PCP in 5-7 days as needed

## 2025-02-21 NOTE — ED PROVIDER NOTES
Encounter Date: 2/20/2025       History     Chief Complaint   Patient presents with    COVID-19 Concerns     C/o cough and body aches onset this am. Denies sob. Taking otc meds today.     44-year-old male presents to ED for evaluation of cough, congestion and body aches that started this morning.  Reports having a headache.  Reports generalized chills.  Denies any shortness of breath or chest pain.  Denies any sick contacts.  Taking over-the-counter medication, Carolyn-Fouke and DayQuil without relief.    The history is provided by the patient. No  was used.     Review of patient's allergies indicates:   Allergen Reactions    Abilify [aripiprazole]     Prednisone     Keflex [cephalexin] Rash     Past Medical History:   Diagnosis Date    Bipolar disorder, unspecified     CLARE (generalized anxiety disorder)     Testicular hypofunction      Past Surgical History:   Procedure Laterality Date    APPENDECTOMY      CHOLECYSTECTOMY      FOOT SURGERY Left     INCISION AND DRAINAGE, UPPER EXTREMITY Left 12/28/2022    Procedure: INCISION AND DRAINAGE, UPPER EXTREMITY;  Surgeon: Levon Gardner MD;  Location: [facility];  Service: General;  Laterality: Left;  Left Elbow, have black arm table    IRRIGATION AND DEBRIDEMENT OF UPPER EXTREMITY  12/30/2022    Procedure: IRRIGATION AND DEBRIDEMENT, UPPER EXTREMITY;  Surgeon: Levon Gardner MD;  Location: [facility];  Service: General;;    TONSILLECTOMY      WRIST SURGERY       No family history on file.  Social History[1]  Review of Systems   Constitutional:  Positive for fatigue. Negative for fever.   HENT:  Positive for congestion and rhinorrhea. Negative for ear pain and sore throat.    Respiratory:  Positive for cough. Negative for shortness of breath and wheezing.    Cardiovascular:  Negative for chest pain.   Gastrointestinal:  Negative for abdominal pain, nausea and vomiting.   Musculoskeletal:  Positive for myalgias.   Neurological:  Negative for headaches.    All other systems reviewed and are negative.      Physical Exam     Initial Vitals [02/20/25 1659]   BP Pulse Resp Temp SpO2   (!) 135/95 (!) 114 19 98.2 °F (36.8 °C) 96 %      MAP       --         Physical Exam    Nursing note and vitals reviewed.  Constitutional: He appears well-developed and well-nourished. He is cooperative.   HENT:   Head: Normocephalic and atraumatic.   Right Ear: Tympanic membrane and external ear normal.   Left Ear: Tympanic membrane and external ear normal. Mouth/Throat: Uvula is midline, oropharynx is clear and moist and mucous membranes are normal. No trismus in the jaw. No uvula swelling.   Eyes: Conjunctivae are normal. Pupils are equal, round, and reactive to light.   Neck: Neck supple.   Normal range of motion.  Cardiovascular:  Normal rate, regular rhythm and normal heart sounds.           Pulmonary/Chest: Breath sounds normal. No respiratory distress. He has no wheezes. He has no rhonchi. He has no rales.   Abdominal: Abdomen is soft. Bowel sounds are normal. There is no abdominal tenderness.   Musculoskeletal:         General: Normal range of motion.      Cervical back: Normal range of motion and neck supple.     Neurological: He is alert and oriented to person, place, and time. He has normal strength. No cranial nerve deficit or sensory deficit. GCS score is 15. GCS eye subscore is 4. GCS verbal subscore is 5. GCS motor subscore is 6.   Skin: Skin is warm and dry. Capillary refill takes less than 2 seconds.   Psychiatric: He has a normal mood and affect.         ED Course   Procedures  Labs Reviewed   COVID/RSV/FLU A&B PCR - Normal       Result Value    Influenza A PCR Not Detected      Influenza B PCR Not Detected      Respiratory Syncytial Virus PCR Not Detected      SARS-CoV-2 PCR Not Detected      Narrative:     The Xpert Xpress SARS-CoV-2/FLU/RSV plus is a rapid, multiplexed real-time PCR test intended for the simultaneous qualitative detection and differentiation of  SARS-CoV-2, Influenza A, Influenza B, and respiratory syncytial virus (RSV) viral RNA in either nasopharyngeal swab or nasal swab specimens.                Imaging Results    None          Medications   ibuprofen tablet 800 mg (800 mg Oral Given 2/20/25 1739)     Medical Decision Making  44-year-old male presents to ED for evaluation of cough, congestion and body aches that started this morning.  Reports having a headache.  Reports generalized chills.  Denies any shortness of breath or chest pain.  Denies any sick contacts.  Taking over-the-counter medication, Carolyn-Beau[place] and DayQuil without relief.    Differential diagnosis includes but isn't limited to Viral syndrome, COVID, flu, strep, sinusitis, bronchitis      Amount and/or Complexity of Data Reviewed  Labs: ordered. Decision-making details documented in ED Course.  Discussion of management or test interpretation with external provider(s): Patient afebrile and in no acute distress presents to ED for evaluation cough, congestion and body aches that started earlier this morning.  Patient is swabbed for COVID flu negative.  Discussed likely viral-like symptoms however very early in presentation that he may not test positive.  Discussed symptomatic care.  Lungs are clear to auscultation with SpO2 of 96% no indication for imaging at this time.  No indication for antibiotics.  Discussed return ED precautions.  Patient verbalizes understanding and agrees with plan of care    Risk  OTC drugs.  Prescription drug management.                                      Clinical Impression:  Final diagnoses:  [J06.9] Viral URI with cough (Primary)          ED Disposition Condition    Discharge Stable          ED Prescriptions       Medication Sig Dispense Start Date End Date Auth. Provider    cetirizine (ZYRTEC) 10 MG tablet Take 1 tablet (10 mg total) by mouth once daily. 30 tablet 2/20/2025 3/22/2025 Marisela Stephens PA    promethazine-dextromethorphan (PROMETHAZINE-DM) 6.25-15  mg/5 mL Syrp Take 5 mLs by mouth every 6 (six) hours as needed. 118 mL 2/20/2025 3/2/2025 Marisela Stephens PA          Follow-up Information       Follow up With Specialties Details Why Contact Info    Jersey Lundbreg MD Internal Medicine   [address] [id number] [phone]                 [1]   Social History  Tobacco Use    Smoking status: Former     Passive exposure: Never    Smokeless tobacco: Never   Substance Use Topics    Alcohol use: Not Currently     Comment: 2yrs sober    Drug use: Yes     Types: Marijuana        Marisela Stephens PA  02/20/25 1819

## 2025-02-27 ENCOUNTER — LAB VISIT (OUTPATIENT)
Dept: LAB | Facility: HOSPITAL | Age: 45
End: 2025-02-27
Payer: MEDICAID

## 2025-02-27 ENCOUNTER — OFFICE VISIT (OUTPATIENT)
Dept: INTERNAL MEDICINE | Facility: CLINIC | Age: 45
End: 2025-02-27
Payer: MEDICAID

## 2025-02-27 VITALS
SYSTOLIC BLOOD PRESSURE: 121 MMHG | HEART RATE: 81 BPM | OXYGEN SATURATION: 96 % | HEIGHT: 70 IN | WEIGHT: 259 LBS | DIASTOLIC BLOOD PRESSURE: 84 MMHG | TEMPERATURE: 98 F | BODY MASS INDEX: 37.08 KG/M2 | RESPIRATION RATE: 20 BRPM

## 2025-02-27 DIAGNOSIS — F41.9 ANXIETY: ICD-10-CM

## 2025-02-27 DIAGNOSIS — R06.2 WHEEZING: ICD-10-CM

## 2025-02-27 DIAGNOSIS — Z79.899 POLYPHARMACY: ICD-10-CM

## 2025-02-27 DIAGNOSIS — K21.9 GASTROESOPHAGEAL REFLUX DISEASE, UNSPECIFIED WHETHER ESOPHAGITIS PRESENT: Primary | ICD-10-CM

## 2025-02-27 DIAGNOSIS — K21.9 GASTROESOPHAGEAL REFLUX DISEASE, UNSPECIFIED WHETHER ESOPHAGITIS PRESENT: ICD-10-CM

## 2025-02-27 DIAGNOSIS — Z87.891 HX OF NICOTINE DEPENDENCE: ICD-10-CM

## 2025-02-27 DIAGNOSIS — F39 MOOD DISORDER: ICD-10-CM

## 2025-02-27 LAB
ALBUMIN SERPL-MCNC: 4.2 G/DL (ref 3.5–5)
ALBUMIN/GLOB SERPL: 1.4 RATIO (ref 1.1–2)
ALP SERPL-CCNC: 69 UNIT/L (ref 40–150)
ALT SERPL-CCNC: 26 UNIT/L (ref 0–55)
ANION GAP SERPL CALC-SCNC: 8 MEQ/L
AST SERPL-CCNC: 21 UNIT/L (ref 5–34)
BASOPHILS # BLD AUTO: 0.05 X10(3)/MCL
BASOPHILS NFR BLD AUTO: 0.8 %
BILIRUB SERPL-MCNC: 0.6 MG/DL
BUN SERPL-MCNC: 6.5 MG/DL (ref 8.9–20.6)
CALCIUM SERPL-MCNC: 9.4 MG/DL (ref 8.4–10.2)
CHLORIDE SERPL-SCNC: 104 MMOL/L (ref 98–107)
CHOLEST SERPL-MCNC: 213 MG/DL
CHOLEST/HDLC SERPL: 6 {RATIO} (ref 0–5)
CO2 SERPL-SCNC: 27 MMOL/L (ref 22–29)
CREAT SERPL-MCNC: 1 MG/DL (ref 0.72–1.25)
CREAT/UREA NIT SERPL: 7
EOSINOPHIL # BLD AUTO: 0.1 X10(3)/MCL (ref 0–0.9)
EOSINOPHIL NFR BLD AUTO: 1.7 %
ERYTHROCYTE [DISTWIDTH] IN BLOOD BY AUTOMATED COUNT: 12.1 % (ref 11.5–17)
EST. AVERAGE GLUCOSE BLD GHB EST-MCNC: 128.4 MG/DL
GFR SERPLBLD CREATININE-BSD FMLA CKD-EPI: >60 ML/MIN/1.73/M2
GLOBULIN SER-MCNC: 3.1 GM/DL (ref 2.4–3.5)
GLUCOSE SERPL-MCNC: 114 MG/DL (ref 74–100)
HBA1C MFR BLD: 6.1 %
HCT VFR BLD AUTO: 46 % (ref 42–52)
HDLC SERPL-MCNC: 35 MG/DL (ref 35–60)
HGB BLD-MCNC: 17 G/DL (ref 14–18)
IMM GRANULOCYTES # BLD AUTO: 0.02 X10(3)/MCL (ref 0–0.04)
IMM GRANULOCYTES NFR BLD AUTO: 0.3 %
LDLC SERPL CALC-MCNC: 160 MG/DL (ref 50–140)
LYMPHOCYTES # BLD AUTO: 2.41 X10(3)/MCL (ref 0.6–4.6)
LYMPHOCYTES NFR BLD AUTO: 40.3 %
MCH RBC QN AUTO: 31.8 PG (ref 27–31)
MCHC RBC AUTO-ENTMCNC: 37 G/DL (ref 33–36)
MCV RBC AUTO: 86.1 FL (ref 80–94)
MONOCYTES # BLD AUTO: 0.49 X10(3)/MCL (ref 0.1–1.3)
MONOCYTES NFR BLD AUTO: 8.2 %
NEUTROPHILS # BLD AUTO: 2.91 X10(3)/MCL (ref 2.1–9.2)
NEUTROPHILS NFR BLD AUTO: 48.7 %
NRBC BLD AUTO-RTO: 0 %
PLATELET # BLD AUTO: 178 X10(3)/MCL (ref 130–400)
PMV BLD AUTO: 9.4 FL (ref 7.4–10.4)
POTASSIUM SERPL-SCNC: 3.8 MMOL/L (ref 3.5–5.1)
PROT SERPL-MCNC: 7.3 GM/DL (ref 6.4–8.3)
RBC # BLD AUTO: 5.34 X10(6)/MCL (ref 4.7–6.1)
SODIUM SERPL-SCNC: 139 MMOL/L (ref 136–145)
TRIGL SERPL-MCNC: 91 MG/DL (ref 34–140)
VLDLC SERPL CALC-MCNC: 18 MG/DL
WBC # BLD AUTO: 5.98 X10(3)/MCL (ref 4.5–11.5)

## 2025-02-27 PROCEDURE — 85025 COMPLETE CBC W/AUTO DIFF WBC: CPT

## 2025-02-27 PROCEDURE — 99214 OFFICE O/P EST MOD 30 MIN: CPT | Mod: PBBFAC

## 2025-02-27 PROCEDURE — 80053 COMPREHEN METABOLIC PANEL: CPT

## 2025-02-27 PROCEDURE — 36415 COLL VENOUS BLD VENIPUNCTURE: CPT

## 2025-02-27 PROCEDURE — 83036 HEMOGLOBIN GLYCOSYLATED A1C: CPT

## 2025-02-27 PROCEDURE — 80061 LIPID PANEL: CPT

## 2025-02-27 RX ORDER — ROPINIROLE 3 MG/1
3 TABLET, FILM COATED ORAL NIGHTLY
COMMUNITY
Start: 2025-02-12

## 2025-02-27 RX ORDER — DIAZEPAM 10 MG/1
10 TABLET ORAL DAILY PRN
COMMUNITY
Start: 2025-02-15

## 2025-02-27 RX ORDER — MIRTAZAPINE 30 MG/1
30 TABLET, FILM COATED ORAL NIGHTLY
COMMUNITY
Start: 2025-02-12

## 2025-02-27 RX ORDER — PANTOPRAZOLE SODIUM 40 MG/1
40 TABLET, DELAYED RELEASE ORAL DAILY
Qty: 90 TABLET | Refills: 3 | Status: SHIPPED | OUTPATIENT
Start: 2025-02-27 | End: 2026-02-27

## 2025-02-27 RX ORDER — AMITRIPTYLINE HYDROCHLORIDE 50 MG/1
150 TABLET, FILM COATED ORAL NIGHTLY
COMMUNITY
Start: 2024-12-14

## 2025-02-27 NOTE — PROGRESS NOTES
INTER[facility]IDENT [facility]  CLINIC NOTE    Patient Name: Dimitry Lee  YOB: 1980    PRESENTING HISTORY       History of Present Illness:  Dimitry Lee is a 42 y.o. male with a history of bipolar & general anxiety disorder who presents to establish care.    Per D/C summary: He presented to the ED on 12/27/2022 with a primary complaint of L elbow abscess and cellulitis. Pt reports waking up Saturday 12/24/22 with a blister on his elbow which popped resulting in an abscess. Has several tattoos on his L arm with the most recent one being 2 weeks ago resulting in small blisters for which he was on bactrim for 3 weeks at time for about 3 months. Also reports having the flu last week and had lost about 10 pnds since then. Had one episode of diarrhea yesterday associated with nausea and fever of 103. He is allergic to keflex ( rash reactions). Denies IV drug use but admits to [facility]. UDS positive for amphetamines, Cannabinoids, phencyclidine.  He reports decreased ROM of the L arm with pain rated 9/10. Denies SOB, chest pain, abdominal pain, vomiting.  Worked up for immunodeficiencies inpatient, as well as given antibiotics after speciation of MRSA showed sensitivity to both vancomycin and doxycycline.  Doxycycline was given on outpatient and patient has followed up with wound care since then.  Last visit when wound care was 1/25 showing improvement.    Past Visit:  Patient is here to establish care.  Three main points tackled today.  First discussed previous medical history including low testosterone, as well as bipolar disorder currently treated on multiple medications began last June (2022).  He states that he is doing well on this regimen, mood doing well.  He does endorse episodes of tremulousness, lasting 2-45 seconds generalized that forced him to have to hold onto objects though he does not lose muscle tone during this time.  Nor does he lose consciousness, he was able to call  "for help.  Additionally he does endorse fidgety movements, noted he is on ropinirole.  He also endorses that he has previously been treated with testosterone, we will get levels today.  He is amenable to pneumonia vaccination today.  2nd point would be getting laboratories for follow up, baseline screenings.  Third point would be to follow up with immunology and we will clarify referral today.    Patient is doing well. Looks a little flat today. Wants to get treated for Low T, however labs were wnl last visit. Manifestation of erectile dysfunction is maintaining tumescence vs. Initiation. Viagra works for him. Otherwise he states that his levels were treated to 700s with previous doctor. Will repeat labs today.     2024: Patient states he has what sounds like RLS symptoms at night in UE as well. He wants to lose weight and get back on testosterone. Otherwise he wants colon screening.    2025: Patient states he has "seizures" when he coughs. He states he passed out one time recently. Lasts 45 seconds with blurring of vision but no LOC or vision loss, post ictal state.    He has numerous drug use, Ecstasy, LSD, and Marijuana.    CURRENT MEDICATIONS      Current Outpatient Medications on File Prior to Visit   Medication Sig    amitriptyline (ELAVIL) 50 MG tablet Take 150 mg by mouth every evening.    cetirizine (ZYRTEC) 10 MG tablet Take 1 tablet (10 mg total) by mouth once daily.    dextroamphetamine-amphetamine 30 mg Tab SMARTSI Tablet(s) By Mouth Morning-Evening    diazePAM (VALIUM) 10 MG Tab Take 10 mg by mouth daily as needed.    fluvoxaMINE (LUVOX) 100 MG tablet Take 150 mg by mouth.    fluvoxaMINE 150 mg 24 hr capsule Take 150 mg by mouth.    mirtazapine (REMERON) 30 MG tablet Take 30 mg by mouth every evening.    OXcarbazepine (TRILEPTAL) 600 MG Tab Take 600 mg by mouth.    rOPINIRole (REQUIP) 3 MG tablet Take 3 mg by mouth every evening.    sildenafiL (VIAGRA) 100 MG tablet TAKE 1 TABLET BY MOUTH " AS DIRECTED 1 HOUR SEXUAL ACTIVITY AS NEEDED    VRAYLAR 6 mg Cap Take 6 mg by mouth Daily.    acetaminophen (TYLENOL) 325 MG tablet Take 2 tablets (650 mg total) by mouth every 8 (eight) hours as needed for Pain (or equal to 101 degree F). (Patient not taking: Reported on 2/27/2025)    mupirocin (BACTROBAN) 2 % ointment APPLY A SMALL AMOUNT OINTMENT TOPICALLY TO INNER NOSE TWICE DAILY (Patient not taking: Reported on 2/27/2025)    semaglutide (OZEMPIC) 1 mg/dose (4 mg/3 mL) Inject 0.5 mg into the skin every 7 days. (Patient not taking: Reported on 2/27/2025)    [DISCONTINUED] busPIRone (BUSPAR) 15 MG tablet Take 15 mg by mouth. (Patient not taking: Reported on 2/27/2025)    [DISCONTINUED] clonazePAM (KLONOPIN) 0.5 MG tablet Take 0.5 mg by mouth daily as needed. (Patient not taking: Reported on 2/27/2025)    [DISCONTINUED] dextroamphetamine-amphetamine 10 mg Tab  (Patient not taking: Reported on 2/27/2025)    [DISCONTINUED] guanFACINE (TENEX) 1 MG Tab Take 1 tablet by mouth every evening. (Patient not taking: Reported on 2/27/2025)    [DISCONTINUED] mirtazapine (REMERON) 45 MG tablet Take 1 tablet by mouth every evening. (Patient not taking: Reported on 2/27/2025)    [DISCONTINUED] ondansetron (ZOFRAN) 4 MG tablet Take 4 mg by mouth every 6 (six) hours as needed. TAKE 1 TABLET BY MOUTH EVERY 6 HOURS FOR 5 DAYS (Patient not taking: Reported on 2/27/2025)    [DISCONTINUED] promethazine-dextromethorphan (PROMETHAZINE-DM) 6.25-15 mg/5 mL Syrp Take 5 mLs by mouth every 6 (six) hours as needed. (Patient not taking: Reported on 2/27/2025)    [DISCONTINUED] rOPINIRole (REQUIP) 1 MG tablet Take 1 mg by mouth every evening. (Patient not taking: Reported on 2/27/2025)    [DISCONTINUED] rOPINIRole (REQUIP) 2 MG tablet Take 2 mg by mouth. (Patient not taking: Reported on 2/27/2025)    [DISCONTINUED] traZODone (DESYREL) 300 MG tablet Take 1 tablet by mouth every evening. (Patient not taking: Reported on 2/27/2025)     No current  facility-administered medications on file prior to visit.       Review of Systems   Constitutional:  Positive for malaise/fatigue. Negative for chills, fever and weight loss.   HENT:  Negative for hearing loss.    Eyes:  Negative for blurred vision, double vision and photophobia.   Respiratory:  Negative for cough.    Cardiovascular:  Negative for chest pain, palpitations and orthopnea.   Gastrointestinal:  Negative for heartburn.   Genitourinary:  Negative for dysuria.   Musculoskeletal:  Negative for myalgias.   Skin:  Negative for rash.   Neurological:  Positive for tremors and weakness. Negative for dizziness, seizures and headaches.   Psychiatric/Behavioral:  Negative for depression and suicidal ideas.          PAST HISTORY:     Past Medical History:   Diagnosis Date    Bipolar disorder, unspecified     CLARE (generalized anxiety disorder)     Testicular hypofunction        Past Surgical History:   Procedure Laterality Date    APPENDECTOMY      CHOLECYSTECTOMY      FOOT SURGERY Left     INCISION AND DRAINAGE, UPPER EXTREMITY Left 12/28/2022    Procedure: INCISION AND DRAINAGE, UPPER EXTREMITY;  Surgeon: Levon Gardner MD;  Location: [facility];  Service: General;  Laterality: Left;  Left Elbow, have black arm table    IRRIGATION AND DEBRIDEMENT OF UPPER EXTREMITY  12/30/2022    Procedure: IRRIGATION AND DEBRIDEMENT, UPPER EXTREMITY;  Surgeon: Levon Gardner MD;  Location: [facility];  Service: General;;    TONSILLECTOMY      WRIST SURGERY         No family history on file.    Social History     Socioeconomic History    Marital status:    Tobacco Use    Smoking status: Former     Passive exposure: Never    Smokeless tobacco: Never   Substance and Sexual Activity    Alcohol use: Not Currently     Comment: 2yrs sober    Drug use: Yes     Types: Marijuana, MDMA (Ecstacy)    Sexual activity: Yes     Social Drivers of Health     Financial Resource Strain: Low Risk  (12/29/2022)    Overall Financial Resource  "Strain (CARDIA)     Difficulty of Paying Living Expenses: Not hard at all   Food Insecurity: No Food Insecurity (12/29/2022)    Hunger Vital Sign     Worried About Running Out of Food in the Last Year: Never true     Ran Out of Food in the Last Year: Never true   Transportation Needs: No Transportation Needs (4/27/2023)    PRAPARE - Transportation     Lack of Transportation (Medical): No     Lack of Transportation (Non-Medical): No   Physical Activity: Insufficiently Active (12/29/2022)    Exercise Vital Sign     Days of Exercise per Week: 1 day     Minutes of Exercise per Session: 30 min   Stress: No Stress Concern Present (12/29/2022)    [employer] - Occupational Stress Questionnaire     Feeling of Stress : Not at all   Housing Stability: Low Risk  (12/29/2022)    Housing Stability Vital Sign     Unable to Pay for Housing in the Last Year: No     Number of Places Lived in the Last Year: 1     Unstable Housing in the Last Year: No       Review of patient's allergies indicates:   Allergen Reactions    Abilify [aripiprazole]     Prednisone     Keflex [cephalexin] Rash       OBJECTIVE:   Vital Signs:  Vitals:    02/27/25 0816   BP: 121/84   Pulse: 81   Resp: 20   Temp: 98.1 °F (36.7 °C)   TempSrc: Oral   SpO2: 96%   Weight: 117.5 kg (259 lb)   Height: 5' 10" (1.778 m)             No results found for this or any previous visit (from the past 24 hours).      Physical Exam:  General:  Awake alert, good muscle tone/bulk, conversational   HEENT:  EOM full, moist mucosa, no congestion   Neck:  No JVD, supple, no lymphadenopathy   Cardiac:  Distinct S1 and S2, no murmur, regular rate and rhythm   Pulmonary:  CTAB, no rales, no wheeze   Abdomen:  Flat, soft, nontender   Neurologic:  Alert awake and oriented x3, nonfocal, no evidence of akathisia, tremors at this time, gait assessed on observation good arm swing, good stride lung, good turns, steady  Psychiatric:  Somewhat flat, " euthymic    Laboratory  CMP:   Recent Labs   Lab 12/29/22  0336 12/30/22  0356 04/27/23  0908   Sodium 141 140 139   Potassium 4.3 3.4 L 4.1   CO2 25 25 26   Blood Urea Nitrogen 12.8 13.1 12.6   Creatinine 0.67 L 0.76 1.05   Glucose 120 H 106 H 122 H   Calcium 8.7 8.3 L 9.6   Albumin 2.3 L 2.4 L 4.5   Bilirubin Total 0.2 0.2 0.4   AST 8 17 14   ALT 10 11 15   ALP 65 60 74     CBC:   Recent Labs   Lab 12/29/22  0336 12/30/22  0356 04/27/23  0908   WBC 6.1 5.0 7.9   Neut # 4.99 2.96 5.60   RBC 4.02 L 3.78 L 5.59   Hgb 12.3 L 11.4 L 17.3   Hct 35.3 L 33.5 L 48.0   Platelet 146 175 209   MCV 87.8 88.6 85.9   RDW 11.8 11.9 12.7     FLP:   Recent Labs   Lab 11/25/22  0752 04/27/23  0908   Cholesterol Total 216 H 239 H   HDL Cholesterol 52 49   LDL Cholesterol 149.00 H 174.00 H   Triglyceride 76 82     DM:   Recent Labs   Lab 11/25/22  0752 12/27/22  0717 12/29/22  0336 12/30/22  0356 04/27/23  0908   Hemoglobin A1c 5.1  --   --   --  5.2   Creatinine 0.93   < > 0.67 L 0.76 1.05    < > = values in this interval not displayed.     Thyroid:   Recent Labs   Lab 11/25/22  0752 04/27/23  0908   TSH 1.1618 1.161   Thyroxine Free 0.69 L  --      Anemia:   Recent Labs   Lab 04/27/23  0908   Hgb 17.3   Hct 48.0   Vitamin B12 564   Folate Level 11.0             ASSESSMENT & PLAN:   Hx of Left elbow abscess/cellulitis - improved  - has had wound care follow up, see note with picture of progress 1/25, was discharged   -inpatient cultures were positive for MRSA, that was sensitive to tetracycline and vancomycin.  Transitioned to oral doxycycline on outpatient and patient completed course  -seeing a dermatologist, who prescribed him TMP/SMX to complete for 4 more days.  Etiology may be tattoo related.    History of chronic wound infections, consideration for primary immunodeficiency  - workup on inpatient showed low IgG, low IgA but normal IgM  - referral was made to allergologist, immunologist  - referred again today, asked patient to  call    Bipolar D/O   Episodic Generalized Tremulousness  Akithisia  Medication Induced Vasomotor Symptoms  - On exam today no evidence of tardive, parkinsonian movements  - On multiple psychiatric medications, defer to Psychiatry in management  - Advised hydration, trigger warnings, weight loss for vasovagal symptoms    HLD  -LDL - 174 4/2023  -ASCVD 10 year risk is 4.2 % no statin indication at this time repeat in 6 months    Hx of Low Testosterone  - wnl 4/2023  - will reorder Testosterone with Free and Total, LH, FSH - these are WNL  - Referral to Urology and Endocrinology - not able to get in, advised to call insurance  - Normal TSH    Acid Reflux  - Taking his mother's Protonix  - Will give him his own at 40mg daily    RTC 6 months, labs today, PFT, CXR, Protonix    Future Appointments     No future appointments.     Orders Placed This Encounter   Procedures    X-Ray Chest PA And Lateral     Standing Status:   Future     Expected Date:   2/27/2025     Expiration Date:   2/27/2026     May the Radiologist modify the order per protocol to meet the clinical needs of the patient?:   Yes     Release to patient:   Immediate    Hemoglobin A1C     Standing Status:   Future     Expected Date:   2/27/2025     Expiration Date:   4/28/2026    Comprehensive Metabolic Panel     Standing Status:   Future     Expected Date:   2/27/2025     Expiration Date:   4/28/2026    CBC Auto Differential     Standing Status:   Future     Expected Date:   2/27/2025     Expiration Date:   4/28/2026    Lipid Panel     Standing Status:   Future     Expected Date:   2/27/2025     Expiration Date:   4/28/2026    Complete PFT with bronchodilator     Standing Status:   Future     Expiration Date:   2/27/2026     Release to patient:   Misha Moraes was seen today for gastroesophageal reflux.    Diagnoses and all orders for this visit:    Gastroesophageal reflux disease, unspecified whether esophagitis present  -     Hemoglobin A1C;  Future  -     Comprehensive Metabolic Panel; Future  -     CBC Auto Differential; Future  -     Lipid Panel; Future    Mood disorder    Anxiety  -     Hemoglobin A1C; Future  -     Comprehensive Metabolic Panel; Future  -     CBC Auto Differential; Future  -     Lipid Panel; Future    Polypharmacy    Wheezing  -     Complete PFT with bronchodilator; Future  -     X-Ray Chest PA And Lateral; Future    Hx of nicotine dependence  -     Complete PFT with bronchodilator; Future  -     X-Ray Chest PA And Lateral; Future    Other orders  -     pantoprazole (PROTONIX) 40 MG tablet; Take 1 tablet (40 mg total) by mouth once daily. Take 30 min prior to first meal of the day              Health Maintenance Due   Topic Date Due    Influenza Vaccine (1) Never done    COVID-19 Vaccine (5 - 2024-25 season) 09/01/2024           Health Maintenance/ Wellness  Pneumococcal vaccine: UTD  TDAP: UTD  Influenza vaccine: n/a  Shingrix vaccine: n/a  AAA U/S screening: due 64 yo  Screening colonoscopy: Colonoscopy done 11/20/2024 -> will look at recommendations for repeat though likely 7-10 years.  Lung Cancer Screening: due 54 yo  Hepatitis Panel: normal 4/2023  HIV - normal 2022      Future Appointments     No future appointments.   Orders Placed This Encounter   Procedures    X-Ray Chest PA And Lateral     Standing Status:   Future     Expected Date:   2/27/2025     Expiration Date:   2/27/2026     May the Radiologist modify the order per protocol to meet the clinical needs of the patient?:   Yes     Release to patient:   Immediate    Hemoglobin A1C     Standing Status:   Future     Expected Date:   2/27/2025     Expiration Date:   4/28/2026    Comprehensive Metabolic Panel     Standing Status:   Future     Expected Date:   2/27/2025     Expiration Date:   4/28/2026    CBC Auto Differential     Standing Status:   Future     Expected Date:   2/27/2025     Expiration Date:   4/28/2026    Lipid Panel     Standing Status:   Future      Expected Date:   2/27/2025     Expiration Date:   4/28/2026    Complete PFT with bronchodilator     Standing Status:   Future     Expiration Date:   2/27/2026     Release to patient:   Immediate         Discussed with Dr. Gordon - Staff Attestation to Follow    Jersey Lundberg MD  [facility] Internal Medicine PGY-3

## 2025-03-28 ENCOUNTER — HOSPITAL ENCOUNTER (OUTPATIENT)
Dept: PULMONOLOGY | Facility: HOSPITAL | Age: 45
Discharge: HOME | End: 2025-03-28
Payer: MEDICAID

## 2025-03-28 DIAGNOSIS — Z87.891 HX OF NICOTINE DEPENDENCE: ICD-10-CM

## 2025-03-28 DIAGNOSIS — R06.2 WHEEZING: ICD-10-CM

## 2025-03-28 LAB
DLCO SINGLE BREATH LLN: 24.99
DLCO SINGLE BREATH PRE REF: 98 %
DLCO SINGLE BREATH REF: 31.91
DLCOC SBVA LLN: 3.26
DLCOC SBVA REF: 4.48
DLCOC SINGLE BREATH LLN: 24.99
DLCOC SINGLE BREATH REF: 31.91
DLCOVA LLN: 3.26
DLCOVA PRE REF: 103.3 %
DLCOVA PRE: 4.63 ML/(MIN*MMHG*L) (ref 3.26–5.69)
DLCOVA REF: 4.48
ERV LLN: -16448.63
ERV PRE REF: 64.8 %
ERV REF: 1.37
FEF 25 75 LLN: 2.46
FEF 25 75 PRE REF: 95.7 %
FEF 25 75 REF: 4.17
FEV1 FVC LLN: 69
FEV1 FVC PRE REF: 100.4 %
FEV1 FVC REF: 80
FEV1 LLN: 3.16
FEV1 PRE REF: 102 %
FEV1 REF: 4.02
FRCPLETH LLN: 2.5
FRCPLETH PREREF: 78.6 %
FRCPLETH REF: 3.48
FVC LLN: 4
FVC PRE REF: 101.2 %
FVC REF: 5.08
IVC PRE: 5.1 L (ref 4–6.17)
IVC SINGLE BREATH LLN: 4
IVC SINGLE BREATH PRE REF: 100.4 %
IVC SINGLE BREATH REF: 5.08
MVV LLN: 130
MVV PRE REF: 100.1 %
MVV REF: 153
PEF LLN: 7.68
PEF PRE REF: 84.9 %
PEF REF: 10
PRE DLCO: 31.26 ML/(MIN*MMHG) (ref 24.99–38.84)
PRE ERV: 0.89 L (ref -16448.63–16451.37)
PRE FEF 25 75: 3.99 L/S (ref 2.46–5.88)
PRE FET 100: 6.45 SEC
PRE FEV1 FVC: 79.88 % (ref 68.89–88.69)
PRE FEV1: 4.11 L (ref 3.16–4.85)
PRE FRC PL: 2.74 L (ref 2.5–4.47)
PRE FVC: 5.14 L (ref 4–6.17)
PRE MVV: 152.99 L/MIN (ref 129.93–175.78)
PRE PEF: 8.49 L/S (ref 7.68–12.31)
PRE RV: 1.85 L (ref 1.44–2.79)
PRE TLC: 6.99 L (ref 5.97–8.28)
RAW LLN: 3.06
RAW PRE REF: 93.2 %
RAW PRE: 2.85 CMH2O*S/L (ref 3.06–3.06)
RAW REF: 3.06
RV LLN: 1.44
RV PRE REF: 87.6 %
RV REF: 2.11
RVTLC LLN: 23
RVTLC PRE REF: 83 %
RVTLC PRE: 26.47 % (ref 22.92–40.88)
RVTLC REF: 32
TLC LLN: 5.97
TLC PRE REF: 98.1 %
TLC REF: 7.13
VA PRE: 6.76 L (ref 6.98–6.98)
VA SINGLE BREATH LLN: 6.98
VA SINGLE BREATH PRE REF: 96.9 %
VA SINGLE BREATH REF: 6.98
VC LLN: 4
VC PRE REF: 101.2 %
VC PRE: 5.14 L (ref 4–6.17)
VC REF: 5.08

## 2025-03-28 PROCEDURE — 94726 PLETHYSMOGRAPHY LUNG VOLUMES: CPT

## 2025-03-28 PROCEDURE — 94729 DIFFUSING CAPACITY: CPT

## 2025-03-28 PROCEDURE — 94010 BREATHING CAPACITY TEST: CPT

## 2025-06-27 DIAGNOSIS — N52.8 OTHER MALE ERECTILE DYSFUNCTION: ICD-10-CM

## 2025-06-27 RX ORDER — SILDENAFIL 100 MG/1
TABLET, FILM COATED ORAL
Qty: 30 TABLET | Refills: 3 | Status: SHIPPED | OUTPATIENT
Start: 2025-06-27

## 2025-08-08 DIAGNOSIS — E29.1 MALE HYPOGONADISM: Primary | ICD-10-CM

## (undated) DEVICE — TIP SUCTION YANKAUER

## (undated) DEVICE — DRAPE EXTREMITY STD 89X128IN

## (undated) DEVICE — GAUZE SPONGE 4X4 12PLY

## (undated) DEVICE — SPONGE LAP STRL 18X18IN

## (undated) DEVICE — GLOVE PROTEXIS BLUE LATEX 7.5

## (undated) DEVICE — PEROXIDE HYDROGEN 3% 16OZ

## (undated) DEVICE — COVER LIGHT HANDLE RIGID GRN

## (undated) DEVICE — BANDAGE CURITY STRETCH 4X75IN

## (undated) DEVICE — GLOVE PROTEXIS HYDROGEL SZ7.5

## (undated) DEVICE — KIT SURGICAL TURNOVER

## (undated) DEVICE — SPONGE GAUZE 16PLY 4X4

## (undated) DEVICE — GLOVE PROTEXIS HYDROGEL SZ7

## (undated) DEVICE — PAD ABDOMINAL STERILE 8X10IN

## (undated) DEVICE — GOWN POLY REINF BRTH SLV XL

## (undated) DEVICE — KIT BASIC ORTHO UNIVERSITY

## (undated) DEVICE — SODIUM CHLORIDE 0.9% 1000ML

## (undated) DEVICE — DRAPE HAND STERILE

## (undated) DEVICE — HANDLE DEVON RIGID OR LIGHT

## (undated) DEVICE — GLOVE PROTEXIS BLUE LATEX 7

## (undated) DEVICE — SPONGE DERMACEA GAUZE 4X4